# Patient Record
Sex: MALE | Race: WHITE | NOT HISPANIC OR LATINO | Employment: OTHER | URBAN - METROPOLITAN AREA
[De-identification: names, ages, dates, MRNs, and addresses within clinical notes are randomized per-mention and may not be internally consistent; named-entity substitution may affect disease eponyms.]

---

## 2024-05-24 ENCOUNTER — HOSPITAL ENCOUNTER (INPATIENT)
Facility: CLINIC | Age: 74
LOS: 1 days | Discharge: ANOTHER HOSPITAL | DRG: 282 | End: 2024-05-25
Attending: EMERGENCY MEDICINE | Admitting: STUDENT IN AN ORGANIZED HEALTH CARE EDUCATION/TRAINING PROGRAM
Payer: MEDICARE

## 2024-05-24 ENCOUNTER — APPOINTMENT (OUTPATIENT)
Dept: GENERAL RADIOLOGY | Facility: CLINIC | Age: 74
DRG: 282 | End: 2024-05-24
Attending: EMERGENCY MEDICINE
Payer: MEDICARE

## 2024-05-24 DIAGNOSIS — I21.4 NSTEMI (NON-ST ELEVATED MYOCARDIAL INFARCTION) (H): Primary | ICD-10-CM

## 2024-05-24 LAB
ANION GAP SERPL CALCULATED.3IONS-SCNC: 13 MMOL/L (ref 7–15)
BASOPHILS # BLD AUTO: 0.1 10E3/UL (ref 0–0.2)
BASOPHILS NFR BLD AUTO: 1 %
BUN SERPL-MCNC: 17.1 MG/DL (ref 8–23)
CALCIUM SERPL-MCNC: 9 MG/DL (ref 8.8–10.2)
CHLORIDE SERPL-SCNC: 98 MMOL/L (ref 98–107)
CHOLEST SERPL-MCNC: 167 MG/DL
CREAT SERPL-MCNC: 0.95 MG/DL (ref 0.67–1.17)
DEPRECATED HCO3 PLAS-SCNC: 23 MMOL/L (ref 22–29)
EGFRCR SERPLBLD CKD-EPI 2021: 85 ML/MIN/1.73M2
EOSINOPHIL # BLD AUTO: 0.1 10E3/UL (ref 0–0.7)
EOSINOPHIL NFR BLD AUTO: 1 %
ERYTHROCYTE [DISTWIDTH] IN BLOOD BY AUTOMATED COUNT: 12 % (ref 10–15)
GLUCOSE BLDC GLUCOMTR-MCNC: 304 MG/DL (ref 70–99)
GLUCOSE BLDC GLUCOMTR-MCNC: 332 MG/DL (ref 70–99)
GLUCOSE SERPL-MCNC: 390 MG/DL (ref 70–99)
HBA1C MFR BLD: 9 %
HCT VFR BLD AUTO: 41.3 % (ref 40–53)
HDLC SERPL-MCNC: 45 MG/DL
HGB BLD-MCNC: 14.5 G/DL (ref 13.3–17.7)
HOLD SPECIMEN: NORMAL
HOLD SPECIMEN: NORMAL
IMM GRANULOCYTES # BLD: 0 10E3/UL
IMM GRANULOCYTES NFR BLD: 0 %
LDLC SERPL CALC-MCNC: 98 MG/DL
LYMPHOCYTES # BLD AUTO: 1.3 10E3/UL (ref 0.8–5.3)
LYMPHOCYTES NFR BLD AUTO: 18 %
MAGNESIUM SERPL-MCNC: 2 MG/DL (ref 1.7–2.3)
MCH RBC QN AUTO: 30.9 PG (ref 26.5–33)
MCHC RBC AUTO-ENTMCNC: 35.1 G/DL (ref 31.5–36.5)
MCV RBC AUTO: 88 FL (ref 78–100)
MONOCYTES # BLD AUTO: 0.7 10E3/UL (ref 0–1.3)
MONOCYTES NFR BLD AUTO: 9 %
NEUTROPHILS # BLD AUTO: 5.4 10E3/UL (ref 1.6–8.3)
NEUTROPHILS NFR BLD AUTO: 71 %
NONHDLC SERPL-MCNC: 122 MG/DL
NRBC # BLD AUTO: 0 10E3/UL
NRBC BLD AUTO-RTO: 0 /100
PHOSPHATE SERPL-MCNC: 2.3 MG/DL (ref 2.5–4.5)
PLATELET # BLD AUTO: 315 10E3/UL (ref 150–450)
POTASSIUM SERPL-SCNC: 4.4 MMOL/L (ref 3.4–5.3)
RBC # BLD AUTO: 4.69 10E6/UL (ref 4.4–5.9)
SODIUM SERPL-SCNC: 134 MMOL/L (ref 135–145)
TRIGL SERPL-MCNC: 119 MG/DL
TROPONIN T SERPL HS-MCNC: 265 NG/L
TROPONIN T SERPL HS-MCNC: 592 NG/L
TROPONIN T SERPL HS-MCNC: 771 NG/L
WBC # BLD AUTO: 7.6 10E3/UL (ref 4–11)

## 2024-05-24 PROCEDURE — 250N000013 HC RX MED GY IP 250 OP 250 PS 637: Performed by: STUDENT IN AN ORGANIZED HEALTH CARE EDUCATION/TRAINING PROGRAM

## 2024-05-24 PROCEDURE — 85025 COMPLETE CBC W/AUTO DIFF WBC: CPT | Performed by: EMERGENCY MEDICINE

## 2024-05-24 PROCEDURE — 120N000001 HC R&B MED SURG/OB

## 2024-05-24 PROCEDURE — 84484 ASSAY OF TROPONIN QUANT: CPT | Performed by: EMERGENCY MEDICINE

## 2024-05-24 PROCEDURE — 84100 ASSAY OF PHOSPHORUS: CPT | Performed by: STUDENT IN AN ORGANIZED HEALTH CARE EDUCATION/TRAINING PROGRAM

## 2024-05-24 PROCEDURE — 36415 COLL VENOUS BLD VENIPUNCTURE: CPT | Performed by: EMERGENCY MEDICINE

## 2024-05-24 PROCEDURE — 99285 EMERGENCY DEPT VISIT HI MDM: CPT | Mod: 25

## 2024-05-24 PROCEDURE — 99223 1ST HOSP IP/OBS HIGH 75: CPT | Mod: AI | Performed by: STUDENT IN AN ORGANIZED HEALTH CARE EDUCATION/TRAINING PROGRAM

## 2024-05-24 PROCEDURE — 80061 LIPID PANEL: CPT | Performed by: STUDENT IN AN ORGANIZED HEALTH CARE EDUCATION/TRAINING PROGRAM

## 2024-05-24 PROCEDURE — 71046 X-RAY EXAM CHEST 2 VIEWS: CPT

## 2024-05-24 PROCEDURE — 250N000012 HC RX MED GY IP 250 OP 636 PS 637: Performed by: STUDENT IN AN ORGANIZED HEALTH CARE EDUCATION/TRAINING PROGRAM

## 2024-05-24 PROCEDURE — 250N000011 HC RX IP 250 OP 636: Performed by: EMERGENCY MEDICINE

## 2024-05-24 PROCEDURE — 250N000013 HC RX MED GY IP 250 OP 250 PS 637: Performed by: EMERGENCY MEDICINE

## 2024-05-24 PROCEDURE — 83036 HEMOGLOBIN GLYCOSYLATED A1C: CPT | Performed by: STUDENT IN AN ORGANIZED HEALTH CARE EDUCATION/TRAINING PROGRAM

## 2024-05-24 PROCEDURE — 96374 THER/PROPH/DIAG INJ IV PUSH: CPT | Mod: 59

## 2024-05-24 PROCEDURE — 36415 COLL VENOUS BLD VENIPUNCTURE: CPT | Performed by: STUDENT IN AN ORGANIZED HEALTH CARE EDUCATION/TRAINING PROGRAM

## 2024-05-24 PROCEDURE — 93005 ELECTROCARDIOGRAM TRACING: CPT

## 2024-05-24 PROCEDURE — 82962 GLUCOSE BLOOD TEST: CPT

## 2024-05-24 PROCEDURE — 80048 BASIC METABOLIC PNL TOTAL CA: CPT | Performed by: EMERGENCY MEDICINE

## 2024-05-24 PROCEDURE — 84484 ASSAY OF TROPONIN QUANT: CPT | Performed by: STUDENT IN AN ORGANIZED HEALTH CARE EDUCATION/TRAINING PROGRAM

## 2024-05-24 PROCEDURE — 83735 ASSAY OF MAGNESIUM: CPT | Performed by: STUDENT IN AN ORGANIZED HEALTH CARE EDUCATION/TRAINING PROGRAM

## 2024-05-24 PROCEDURE — 85520 HEPARIN ASSAY: CPT | Performed by: STUDENT IN AN ORGANIZED HEALTH CARE EDUCATION/TRAINING PROGRAM

## 2024-05-24 RX ORDER — NICOTINE POLACRILEX 4 MG
15-30 LOZENGE BUCCAL
Status: DISCONTINUED | OUTPATIENT
Start: 2024-05-24 | End: 2024-05-25 | Stop reason: HOSPADM

## 2024-05-24 RX ORDER — PROCHLORPERAZINE 25 MG
12.5 SUPPOSITORY, RECTAL RECTAL EVERY 12 HOURS PRN
Status: DISCONTINUED | OUTPATIENT
Start: 2024-05-24 | End: 2024-05-25 | Stop reason: HOSPADM

## 2024-05-24 RX ORDER — AMOXICILLIN 250 MG
1 CAPSULE ORAL 2 TIMES DAILY PRN
Status: DISCONTINUED | OUTPATIENT
Start: 2024-05-24 | End: 2024-05-25 | Stop reason: HOSPADM

## 2024-05-24 RX ORDER — AMOXICILLIN 250 MG
2 CAPSULE ORAL 2 TIMES DAILY PRN
Status: DISCONTINUED | OUTPATIENT
Start: 2024-05-24 | End: 2024-05-25 | Stop reason: HOSPADM

## 2024-05-24 RX ORDER — HEPARIN SODIUM 10000 [USP'U]/100ML
0-5000 INJECTION, SOLUTION INTRAVENOUS CONTINUOUS
Status: DISCONTINUED | OUTPATIENT
Start: 2024-05-24 | End: 2024-05-25 | Stop reason: HOSPADM

## 2024-05-24 RX ORDER — ASPIRIN 81 MG/1
81 TABLET, CHEWABLE ORAL DAILY
Status: DISCONTINUED | OUTPATIENT
Start: 2024-05-25 | End: 2024-05-25 | Stop reason: HOSPADM

## 2024-05-24 RX ORDER — CALCIUM CARBONATE 500 MG/1
1000 TABLET, CHEWABLE ORAL 4 TIMES DAILY PRN
Status: DISCONTINUED | OUTPATIENT
Start: 2024-05-24 | End: 2024-05-25 | Stop reason: HOSPADM

## 2024-05-24 RX ORDER — ROSUVASTATIN CALCIUM 20 MG/1
20 TABLET, COATED ORAL AT BEDTIME
Status: DISCONTINUED | OUTPATIENT
Start: 2024-05-24 | End: 2024-05-25 | Stop reason: HOSPADM

## 2024-05-24 RX ORDER — METOPROLOL TARTRATE 25 MG/1
25 TABLET, FILM COATED ORAL 2 TIMES DAILY
Status: DISCONTINUED | OUTPATIENT
Start: 2024-05-24 | End: 2024-05-25 | Stop reason: HOSPADM

## 2024-05-24 RX ORDER — METFORMIN HCL 500 MG
1000 TABLET, EXTENDED RELEASE 24 HR ORAL 2 TIMES DAILY WITH MEALS
COMMUNITY
Start: 2023-11-13

## 2024-05-24 RX ORDER — LIDOCAINE 40 MG/G
CREAM TOPICAL
Status: DISCONTINUED | OUTPATIENT
Start: 2024-05-24 | End: 2024-05-25 | Stop reason: HOSPADM

## 2024-05-24 RX ORDER — NALOXONE HYDROCHLORIDE 0.4 MG/ML
0.4 INJECTION, SOLUTION INTRAMUSCULAR; INTRAVENOUS; SUBCUTANEOUS
Status: DISCONTINUED | OUTPATIENT
Start: 2024-05-24 | End: 2024-05-25 | Stop reason: HOSPADM

## 2024-05-24 RX ORDER — AMLODIPINE BESYLATE 5 MG/1
1 TABLET ORAL DAILY
Status: ON HOLD | COMMUNITY
Start: 2023-10-03 | End: 2024-05-26

## 2024-05-24 RX ORDER — ASPIRIN 325 MG
325 TABLET ORAL ONCE
Status: COMPLETED | OUTPATIENT
Start: 2024-05-24 | End: 2024-05-24

## 2024-05-24 RX ORDER — MORPHINE SULFATE 2 MG/ML
1 INJECTION, SOLUTION INTRAMUSCULAR; INTRAVENOUS
Status: DISCONTINUED | OUTPATIENT
Start: 2024-05-24 | End: 2024-05-25 | Stop reason: HOSPADM

## 2024-05-24 RX ORDER — ACETAMINOPHEN 325 MG/1
650 TABLET ORAL EVERY 4 HOURS PRN
Status: DISCONTINUED | OUTPATIENT
Start: 2024-05-24 | End: 2024-05-25 | Stop reason: HOSPADM

## 2024-05-24 RX ORDER — DEXTROSE MONOHYDRATE 25 G/50ML
25-50 INJECTION, SOLUTION INTRAVENOUS
Status: DISCONTINUED | OUTPATIENT
Start: 2024-05-24 | End: 2024-05-25 | Stop reason: HOSPADM

## 2024-05-24 RX ORDER — GLIPIZIDE 5 MG/1
10 TABLET, FILM COATED, EXTENDED RELEASE ORAL
COMMUNITY

## 2024-05-24 RX ORDER — NALOXONE HYDROCHLORIDE 0.4 MG/ML
0.2 INJECTION, SOLUTION INTRAMUSCULAR; INTRAVENOUS; SUBCUTANEOUS
Status: DISCONTINUED | OUTPATIENT
Start: 2024-05-24 | End: 2024-05-25 | Stop reason: HOSPADM

## 2024-05-24 RX ORDER — LISINOPRIL 10 MG/1
10 TABLET ORAL DAILY
Status: DISCONTINUED | OUTPATIENT
Start: 2024-05-24 | End: 2024-05-25 | Stop reason: HOSPADM

## 2024-05-24 RX ORDER — ACETAMINOPHEN 650 MG/1
650 SUPPOSITORY RECTAL EVERY 4 HOURS PRN
Status: DISCONTINUED | OUTPATIENT
Start: 2024-05-24 | End: 2024-05-25 | Stop reason: HOSPADM

## 2024-05-24 RX ORDER — ONDANSETRON 2 MG/ML
4 INJECTION INTRAMUSCULAR; INTRAVENOUS EVERY 6 HOURS PRN
Status: DISCONTINUED | OUTPATIENT
Start: 2024-05-24 | End: 2024-05-25 | Stop reason: HOSPADM

## 2024-05-24 RX ORDER — ONDANSETRON 4 MG/1
4 TABLET, ORALLY DISINTEGRATING ORAL EVERY 6 HOURS PRN
Status: DISCONTINUED | OUTPATIENT
Start: 2024-05-24 | End: 2024-05-25 | Stop reason: HOSPADM

## 2024-05-24 RX ORDER — PROCHLORPERAZINE MALEATE 5 MG
5 TABLET ORAL EVERY 6 HOURS PRN
Status: DISCONTINUED | OUTPATIENT
Start: 2024-05-24 | End: 2024-05-25 | Stop reason: HOSPADM

## 2024-05-24 RX ORDER — DAPAGLIFLOZIN 5 MG/1
5 TABLET, FILM COATED ORAL DAILY
COMMUNITY
End: 2024-05-24

## 2024-05-24 RX ADMIN — POTASSIUM & SODIUM PHOSPHATES POWDER PACK 280-160-250 MG 1 PACKET: 280-160-250 PACK at 22:43

## 2024-05-24 RX ADMIN — HEPARIN SODIUM 1150 UNITS/HR: 10000 INJECTION, SOLUTION INTRAVENOUS at 17:59

## 2024-05-24 RX ADMIN — ASPIRIN 325 MG ORAL TABLET 325 MG: 325 PILL ORAL at 17:38

## 2024-05-24 RX ADMIN — INSULIN GLARGINE 15 UNITS: 100 INJECTION, SOLUTION SUBCUTANEOUS at 21:16

## 2024-05-24 RX ADMIN — METOPROLOL TARTRATE 25 MG: 25 TABLET, FILM COATED ORAL at 21:11

## 2024-05-24 RX ADMIN — ROSUVASTATIN CALCIUM 20 MG: 20 TABLET, FILM COATED ORAL at 21:11

## 2024-05-24 RX ADMIN — LISINOPRIL 10 MG: 10 TABLET ORAL at 21:12

## 2024-05-24 ASSESSMENT — ACTIVITIES OF DAILY LIVING (ADL)
ADLS_ACUITY_SCORE: 36
ADLS_ACUITY_SCORE: 35
ADLS_ACUITY_SCORE: 36
ADLS_ACUITY_SCORE: 35

## 2024-05-24 ASSESSMENT — COLUMBIA-SUICIDE SEVERITY RATING SCALE - C-SSRS
2. HAVE YOU ACTUALLY HAD ANY THOUGHTS OF KILLING YOURSELF IN THE PAST MONTH?: NO
6. HAVE YOU EVER DONE ANYTHING, STARTED TO DO ANYTHING, OR PREPARED TO DO ANYTHING TO END YOUR LIFE?: NO
1. IN THE PAST MONTH, HAVE YOU WISHED YOU WERE DEAD OR WISHED YOU COULD GO TO SLEEP AND NOT WAKE UP?: NO

## 2024-05-24 NOTE — ED TRIAGE NOTES
Chest heaviness for 4 hours. Radiates to left shoulder. No SOB. Had cold sweat and nauseated when it started. Was resting when he noticed pain.

## 2024-05-24 NOTE — PHARMACY-ADMISSION MEDICATION HISTORY
Pharmacist Admission Medication History    Admission medication history is complete. The information provided in this note is only as accurate as the sources available at the time of the update.    Information Source(s): Patient and CareEverywhere/SureScripts via in-person    Pertinent Information: None    Changes made to PTA medication list:  Added: ALL  Deleted: None  Changed: None    Allergies reviewed with patient and updates made in EHR: yes    Medication History Completed By: Francisco Grimaldo RP 5/24/2024 6:26 PM    PTA Med List   Medication Sig Last Dose    amLODIPine (NORVASC) 5 MG tablet Take 1 tablet by mouth daily 5/24/2024 at AM    glipiZIDE (GLUCOTROL XL) 5 MG 24 hr tablet Take 10 mg by mouth daily (with breakfast) 5/24/2024 at AM    metFORMIN (GLUCOPHAGE XR) 500 MG 24 hr tablet Take 1,000 mg by mouth 2 times daily (with meals) 5/24/2024 at x1

## 2024-05-24 NOTE — H&P
[facility]    History and Physical - Hospitalist Service       Date of Admission:  5/24/2024    Assessment & Plan      Kaden Cain is a 74 year old male admitted on 5/24/2024.     He has history of diabetes mellitus type 2 and essential hypertension.    He he presented to ER with sudden onset chest pressure with radiation to the left shoulder starting around 11:30 AM.  It was associated with some nausea and diaphoresis and patient struggled with the symptoms for about 2 hours and the subsided and he was able to take a nap.  When his wife came back from work she advised her to come to the ER.  His symptoms had pretty much resolved by the time he came to the ER.    Vitals on presentation: Temperature 97.40 Fahrenheit, heart rate 95/min, blood pressure 167/118, respirate rate of 18 and oxygen saturation of 99% on room air.    His EKG showed nonspecific ST changes.  First troponin was elevated at 265.  Rest of the CBC and BMP were unremarkable except glucose of 390.    NSTEMI.  Started on heparin drip and aspirin.  Cardiology consult.  Check echocardiogram.  Check lipid panel, add Crestor 20 mg nightly.    Diabetes mellitus type 2 uncontrolled.  Blood sugar on presentation was 390.  Blood sugar appears to be uncontrolled at baseline to with patient and his blood sugars are often around 200.  Hold prior to admission dose of glipizide and metformin.  Will do Lantus 15 units at night, NovoLog 5 units Premeal and sliding scale while inpatient.    Essential hypertension.  Prior to admission on amlodipine which can be continued.  Will add metoprolol 25 mg daily.          Diet:  Cardiac diet.  DVT Prophylaxis: On heparin drip  Stephens Catheter: Not present  Lines: None     Cardiac Monitoring: None  Code Status:  Full code    Clinically Significant Risk Factors Present on Admission                       # Overweight: Estimated body mass index is 27.28 kg/m  as calculated from the following:    Height as  "of this encounter: 1.854 m (6' 1\").    Weight as of this encounter: 93.8 kg (206 lb 12.7 oz).              Disposition Plan     Medically Ready for Discharge: Anticipated in 2-4 Days           Kelvin Antony MD  Hospitalist Service  [facility]  Securely message with [email or web address] (more info)  Text page via [email or web address] Paging/Directory     ______________________________________________________________________    Chief Complaint   Chest pressure    History is obtained from the patient.  Wife at bedside.    History of Present Illness   Kaden Cain is a 74 year old male admitted on 5/24/2024.     He has history of diabetes mellitus type 2 and essential hypertension.    He he presented to ER with sudden onset chest pressure with radiation to the left shoulder starting around 11:30 AM.  It was associated with some nausea and diaphoresis and patient struggled with the symptoms for about 2 hours and the subsided and he was able to take a nap.  When his wife came back from work she advised her to come to the ER.  His symptoms had pretty much resolved by the time he came to the ER.    Vitals on presentation: Temperature 97.40 Fahrenheit, heart rate 95/min, blood pressure 167/118, respirate rate of 18 and oxygen saturation of 99% on room air.    His EKG showed nonspecific ST changes.  First troponin was elevated at 265.  Rest of the CBC and BMP were unremarkable except glucose of 390.    Past Medical History    No past medical history on file.    Past Surgical History   No past surgical history on file.    Prior to Admission Medications   Prior to Admission Medications   Prescriptions Last Dose Informant Patient Reported? Taking?   amLODIPine (NORVASC) 5 MG tablet   Yes Yes   Sig: Take 1 tablet by mouth daily   dapagliflozin (FARXIGA) 5 MG TABS tablet   Yes Yes   Sig: Take 5 mg by mouth daily   glipiZIDE (GLUCOTROL XL) 5 MG 24 hr tablet   Yes Yes   Sig: Take 10 mg by mouth daily (with breakfast)   metFORMIN " (GLUCOPHAGE XR) 500 MG 24 hr tablet   Yes Yes   Sig: Take 1 tablet with dinner. After 1 week, increase to 2 tablets with dinner. After another week, increase to 1 tablet with breakfast and 2 tablets with dinner. After another week, increase to 2 tablets with breakfast and 2 tablets with dinner.      Facility-Administered Medications: None        Review of Systems    The 10 point Review of Systems is negative other than noted in the HPI or here.      Physical Exam   Vital Signs: Temp: 97.4  F (36.3  C) Temp src: Temporal BP: (!) 158/98 Pulse: 77   Resp: 22 SpO2: 97 % O2 Device: None (Room air)    Weight: 206 lbs 12.66 oz    General Appearance: Alert, awake and oriented x 3.  Respiratory: Clear to auscultation.  Cardiovascular: S1-S2 normal  GI: Soft and nontender  Skin: No rash  Other: No edema    Medical Decision Making       MANAGEMENT DISCUSSED with the following over the past 24 hours: ER physician, patient and his wife at bedside       Data     I have personally reviewed the following data over the past 24 hrs:    7.6  \   14.5   / 315     134 (L) 98 17.1 /  332 (H)   4.4 23 0.95 \     Trop: 265 (HH) BNP: N/A       Imaging results reviewed over the past 24 hrs:   Recent Results (from the past 24 hour(s))   Chest XR,  PA & LAT    Narrative    EXAM: XR CHEST 2 VIEWS  LOCATION: [facility]  DATE: 5/24/2024    INDICATION: chest pain  COMPARISON: None.      Impression    IMPRESSION:     Heart size is normal. Lungs are clear bilaterally. Mediastinum and visualized bony structures are unremarkable.

## 2024-05-24 NOTE — ED PROVIDER NOTES
"  Emergency Department Note      History of Present Illness     Chief Complaint  Chest Pain    HPI  Kaden Cain is a 74 year old male with history of hypertension and type 2 diabetes who presents at the emergency department with his wife for evaluation of chest pain. Kaden reports that around 1130 this morning he experienced a sudden onset of chest pressure while seated at his house. He described it felt as if someone was \"standing on his chest.\" The chest pain radiated to his left shoulder, in which he described felt like a moderate \"tooth ache\" in his shoulder. His chest pressure became severe for about an hour, during which he also had nausea and cold sweats. He denies shortness of breath, but acknowledges that breathing made his pains worse. His chest pressure has somewhat resolved with time but is still present at the emergency department. Kaden states that this has not happened before and he felt fine prior. Patient notes taking metformin and glipizide for his type 2 diabetes and amlodipine for hypertension. He denies taking Aspirin or tylenol for pain. The patient notes no activity changes, but mowed the lawn yesterday. Denies abdominal pain.    Independent Historian  None    Review of External Notes  5/13/24: Clinic note reviewed    Past Medical History   Medical History and Problem List  Benign neoplasm of sigmoid colon  Benign neoplasm of transverse colon  Disorder of intestine  Polyp of colon  Type 2 diabetes  Hypertension     Medications  Norvasc  Farxiga  Glipizide  Metformin    Surgical History   The patient has no pertinent past surgical history.   Physical Exam   Patient Vitals for the past 24 hrs:   BP Temp Temp src Pulse Resp SpO2 Height Weight   05/24/24 1730 (!) 158/98 -- -- 77 22 97 % -- --   05/24/24 1639 (!) 167/118 97.4  F (36.3  C) Temporal 95 18 99 % 1.854 m (6' 1\") 93.8 kg (206 lb 12.7 oz)     Physical Exam  General: No acute distress  Head: No obvious trauma to head.  Ears, Nose, " Throat:  External ears normal.  Nose normal.  No pharyngeal erythema, swelling or exudate.  Midline uvula. Moist mucus membranes.  Eyes:  Conjunctivae clear.   Neck: Normal range of motion.  Neck supple.   CV: Regular rate and rhythm.  No murmurs.      Respiratory: Effort normal and breath sounds normal.  No wheezing or crackles.   Gastrointestinal: Soft.  No distension. There is no tenderness.  There is no rigidity, no rebound and no guarding.   Musculoskeletal: Normal range of motion.  Non tender extremities to palpations. No lower extremity edema  Neuro: Alert. Moving all extremities appropriately.  Normal speech.    Skin: Skin is warm and dry.  No rash noted.   Psych: Normal mood and affect. Behavior is normal.     Diagnostics   Lab Results   Labs Ordered and Resulted from Time of ED Arrival to Time of ED Departure   BASIC METABOLIC PANEL - Abnormal       Result Value    Sodium 134 (*)     Potassium 4.4      Chloride 98      Carbon Dioxide (CO2) 23      Anion Gap 13      Urea Nitrogen 17.1      Creatinine 0.95      GFR Estimate 85      Calcium 9.0      Glucose 390 (*)    TROPONIN T, HIGH SENSITIVITY - Abnormal    Troponin T, High Sensitivity 265 (*)    CBC WITH PLATELETS AND DIFFERENTIAL    WBC Count 7.6      RBC Count 4.69      Hemoglobin 14.5      Hematocrit 41.3      MCV 88      MCH 30.9      MCHC 35.1      RDW 12.0      Platelet Count 315      % Neutrophils 71      % Lymphocytes 18      % Monocytes 9      % Eosinophils 1      % Basophils 1      % Immature Granulocytes 0      NRBCs per 100 WBC 0      Absolute Neutrophils 5.4      Absolute Lymphocytes 1.3      Absolute Monocytes 0.7      Absolute Eosinophils 0.1      Absolute Basophils 0.1      Absolute Immature Granulocytes 0.0      Absolute NRBCs 0.0     GLUCOSE MONITOR NURSING POCT     Imaging  Chest XR,  PA & LAT   Final Result   IMPRESSION:       Heart size is normal. Lungs are clear bilaterally. Mediastinum and visualized bony structures are  unremarkable.        EKG   ECG taken at 1646, ECG read at 1646  Sinus rhythm with 1st degree AV block with premature supraventricular complexes  Left axis deviation  Left ventricular hypertrophy with repolarization abnormality   Rate 88 bpm. PA interval 218 ms. QRS duration 90 ms. QT/QTc 372/450 ms. P-R-T axes 62 -39 110.    Independent Interpretation  CXR: No pneumothorax, infiltrate, or pleural effusion.  ED Course    Medications Administered  Medications   heparin 25,000 units in 0.45% NaCl 250 mL ANTICOAGULANT infusion (1,150 Units/hr Intravenous $New Bag 5/24/24 1756)   aspirin (ASA) tablet 325 mg (325 mg Oral $Given 5/24/24 1738)   heparin loading dose for LOW INTENSITY TREATMENT * Give BEFORE starting heparin infusion (5,650 Units Intravenous $Given 5/24/24 1801)     Discussion of Management  Admitting Hospitalist, Dr. Antony.    Social Determinants of Health adding to complexity of care  None    ED Course  ED Course as of 05/24/24 1806   Fri May 24, 2024   1644 I obtained history and examined the patient as noted above     1743 I rechecked and updated the patient. I told him about plan for admission.   1803 I spoke with hospitalist, Dr. Antony, for admission.     Medical Decision Making / Diagnosis   CMS Diagnoses: None    MIPS     None    [facility]  Kaden Cain is a 74 year old male presenting with chest pain.  He is hemodynamically stable and appears well.  His story about his chest pain is concerning, given that it radiated to the left shoulder, and he was nauseated.  He is given a full dose aspirin.  EKG shows no ischemic changes.  CBC, BMP are grossly unremarkable.  Troponin is elevated 265.  He continues to endorse chest pain, but states it is improving.  He is started on a heparin infusion.  Shortly afterward, he denies any chest pain.  I discussed the patient with the hospitalist, agreed to admit the patient to their service.  He remains in stable condition and is transferred to the  floor.    Disposition  The patient was admitted to the hospital.     ICD-10 Codes:    ICD-10-CM    1. NSTEMI (non-ST elevated myocardial infarction) (H)  I21.4          Discharge Medications  New Prescriptions    No medications on file     Scribe Disclosure:  Lauri NUNEZ, am serving as a scribe at 5:26 PM on 5/24/2024 to document services personally performed by Lance Lopez MD based on my observations and the provider's statements to me.     Scribe Disclosure:  IIRVIN, am serving as a scribe  at 5:18 PM on 5/24/2024 to document services personally performed by Lance Lopez MD based on my observations and the provider's statements to me.      Lance Lopez MD  05/24/24 [id number]

## 2024-05-24 NOTE — ED NOTES
"[facility]  ED Nurse Handoff Report    Kaden Cain is a 74 year old male   ED Chief complaint: Chest Pain  . ED Diagnosis:   Final diagnoses:   NSTEMI (non-ST elevated myocardial infarction) (H)     Allergies: No Known Allergies    Code Status: Full Code  Activity level - Baseline/Home:  Independent.   Activity Level - Current:   Assist X 1.   Lift room needed: No.   Bariatric: No   Needed: No   Isolation: No.   Infection: Not Applicable.     Vital Signs:   Vitals:    05/24/24 1639 05/24/24 1730   BP: (!) 167/118 (!) 158/98   Pulse: 95 77   Resp: 18 22   Temp: 97.4  F (36.3  C)    TempSrc: Temporal    SpO2: 99% 97%   Weight: 93.8 kg (206 lb 12.7 oz)    Height: 1.854 m (6' 1\")        Cardiac Rhythm:  ,      Pain level:    Patient confused: No.   Patient Falls Risk: No.   Elimination Status: Has voided   Patient Report - Initial Complaint: Chest pain.   Focused Assessment: He he presented to ER with sudden onset chest pressure with radiation to the left shoulder starting around 11:30 AM.  It was associated with some nausea and diaphoresis and patient struggled with the symptoms for about 2 hours and the subsided and he was able to take a nap.  When his wife came back from work she advised her to come to the ER.  His symptoms had pretty much resolved by the time he came to the ER.    Tests Performed:   Labs Ordered and Resulted from Time of ED Arrival to Time of ED Departure   BASIC METABOLIC PANEL - Abnormal       Result Value    Sodium 134 (*)     Potassium 4.4      Chloride 98      Carbon Dioxide (CO2) 23      Anion Gap 13      Urea Nitrogen 17.1      Creatinine 0.95      GFR Estimate 85      Calcium 9.0      Glucose 390 (*)    TROPONIN T, HIGH SENSITIVITY - Abnormal    Troponin T, High Sensitivity 265 (*)    GLUCOSE BY METER - Abnormal    GLUCOSE BY METER POCT 332 (*)    CBC WITH PLATELETS AND DIFFERENTIAL    WBC Count 7.6      RBC Count 4.69      Hemoglobin 14.5      Hematocrit 41.3      " MCV 88      MCH 30.9      MCHC 35.1      RDW 12.0      Platelet Count 315      % Neutrophils 71      % Lymphocytes 18      % Monocytes 9      % Eosinophils 1      % Basophils 1      % Immature Granulocytes 0      NRBCs per 100 WBC 0      Absolute Neutrophils 5.4      Absolute Lymphocytes 1.3      Absolute Monocytes 0.7      Absolute Eosinophils 0.1      Absolute Basophils 0.1      Absolute Immature Granulocytes 0.0      Absolute NRBCs 0.0     GLUCOSE MONITOR NURSING POCT     Chest XR,  PA & LAT   Final Result   IMPRESSION:       Heart size is normal. Lungs are clear bilaterally. Mediastinum and visualized bony structures are unremarkable.        Treatments provided: See Epic  Family Comments: Spouse at bedside  OBS brochure/video discussed/provided to patient:  No  ED Medications:   Medications   heparin 25,000 units in 0.45% NaCl 250 mL ANTICOAGULANT infusion (1,150 Units/hr Intravenous $New Bag 5/24/24 1756)   insulin aspart (NovoLOG) injection (RAPID ACTING) (has no administration in time range)   aspirin (ASA) tablet 325 mg (325 mg Oral $Given 5/24/24 173)   heparin loading dose for LOW INTENSITY TREATMENT * Give BEFORE starting heparin infusion (5,650 Units Intravenous $Given 5/24/24 1801)     Drips infusing:  Yes  For the majority of the shift, the patient's behavior Green.   Interventions performed were NA.    Sepsis treatment initiated: No     Patient tested for COVID 19 prior to admission: NO    ED Nurse Name/Phone Number: Margaret Camargo RN,   6:51 PM  RECEIVING UNIT ED HANDOFF REVIEW    Above ED Nurse Handoff Report was reviewed: Yes  Reviewed by: Mala Ribeiro RN on May 24, 2024 at 7:03 PM   ENRIQUE Adams called the ED to inform them the note was read: No

## 2024-05-25 ENCOUNTER — APPOINTMENT (OUTPATIENT)
Dept: CARDIOLOGY | Facility: CLINIC | Age: 74
DRG: 282 | End: 2024-05-25
Attending: STUDENT IN AN ORGANIZED HEALTH CARE EDUCATION/TRAINING PROGRAM
Payer: MEDICARE

## 2024-05-25 ENCOUNTER — HOSPITAL ENCOUNTER (INPATIENT)
Facility: CLINIC | Age: 74
LOS: 1 days | Discharge: HOME | DRG: 321 | End: 2024-05-26
Attending: STUDENT IN AN ORGANIZED HEALTH CARE EDUCATION/TRAINING PROGRAM | Admitting: STUDENT IN AN ORGANIZED HEALTH CARE EDUCATION/TRAINING PROGRAM
Payer: MEDICARE

## 2024-05-25 VITALS
WEIGHT: 206.7 LBS | RESPIRATION RATE: 16 BRPM | DIASTOLIC BLOOD PRESSURE: 73 MMHG | OXYGEN SATURATION: 97 % | HEART RATE: 71 BPM | BODY MASS INDEX: 27.4 KG/M2 | TEMPERATURE: 98 F | SYSTOLIC BLOOD PRESSURE: 138 MMHG | HEIGHT: 73 IN

## 2024-05-25 DIAGNOSIS — I21.4 NSTEMI (NON-ST ELEVATED MYOCARDIAL INFARCTION) (H): Primary | ICD-10-CM

## 2024-05-25 LAB
ACT BLD: 271 SECONDS (ref 74–150)
ACT BLD: 321 SECONDS (ref 74–150)
ANION GAP SERPL CALCULATED.3IONS-SCNC: 12 MMOL/L (ref 7–15)
BUN SERPL-MCNC: 15.4 MG/DL (ref 8–23)
CALCIUM SERPL-MCNC: 8.9 MG/DL (ref 8.8–10.2)
CHLORIDE SERPL-SCNC: 103 MMOL/L (ref 98–107)
CHOLEST SERPL-MCNC: 146 MG/DL
CREAT SERPL-MCNC: 0.99 MG/DL (ref 0.67–1.17)
DEPRECATED HCO3 PLAS-SCNC: 25 MMOL/L (ref 22–29)
EGFRCR SERPLBLD CKD-EPI 2021: 80 ML/MIN/1.73M2
ERYTHROCYTE [DISTWIDTH] IN BLOOD BY AUTOMATED COUNT: 11.9 % (ref 10–15)
ERYTHROCYTE [DISTWIDTH] IN BLOOD BY AUTOMATED COUNT: 12 % (ref 10–15)
GLUCOSE BLDC GLUCOMTR-MCNC: 110 MG/DL (ref 70–99)
GLUCOSE BLDC GLUCOMTR-MCNC: 131 MG/DL (ref 70–99)
GLUCOSE BLDC GLUCOMTR-MCNC: 140 MG/DL (ref 70–99)
GLUCOSE BLDC GLUCOMTR-MCNC: 157 MG/DL (ref 70–99)
GLUCOSE BLDC GLUCOMTR-MCNC: 233 MG/DL (ref 70–99)
GLUCOSE SERPL-MCNC: 119 MG/DL (ref 70–99)
HCT VFR BLD AUTO: 40.5 % (ref 40–53)
HCT VFR BLD AUTO: 40.9 % (ref 40–53)
HDLC SERPL-MCNC: 42 MG/DL
HGB BLD-MCNC: 13.9 G/DL (ref 13.3–17.7)
HGB BLD-MCNC: 14 G/DL (ref 13.3–17.7)
LDLC SERPL CALC-MCNC: 91 MG/DL
LVEF ECHO: NORMAL
MAGNESIUM SERPL-MCNC: 2.2 MG/DL (ref 1.7–2.3)
MCH RBC QN AUTO: 30.7 PG (ref 26.5–33)
MCH RBC QN AUTO: 30.8 PG (ref 26.5–33)
MCHC RBC AUTO-ENTMCNC: 34.2 G/DL (ref 31.5–36.5)
MCHC RBC AUTO-ENTMCNC: 34.3 G/DL (ref 31.5–36.5)
MCV RBC AUTO: 89 FL (ref 78–100)
MCV RBC AUTO: 90 FL (ref 78–100)
NONHDLC SERPL-MCNC: 104 MG/DL
PHOSPHATE SERPL-MCNC: 3.7 MG/DL (ref 2.5–4.5)
PLATELET # BLD AUTO: 277 10E3/UL (ref 150–450)
PLATELET # BLD AUTO: 287 10E3/UL (ref 150–450)
POTASSIUM SERPL-SCNC: 4.3 MMOL/L (ref 3.4–5.3)
RBC # BLD AUTO: 4.53 10E6/UL (ref 4.4–5.9)
RBC # BLD AUTO: 4.54 10E6/UL (ref 4.4–5.9)
SODIUM SERPL-SCNC: 140 MMOL/L (ref 135–145)
TRIGL SERPL-MCNC: 64 MG/DL
TROPONIN T SERPL HS-MCNC: 1056 NG/L
TROPONIN T SERPL HS-MCNC: 842 NG/L
TROPONIN T SERPL HS-MCNC: 952 NG/L
UFH PPP CHRO-ACNC: 0.26 IU/ML
UFH PPP CHRO-ACNC: 0.3 IU/ML
UFH PPP CHRO-ACNC: 0.33 IU/ML
WBC # BLD AUTO: 7.4 10E3/UL (ref 4–11)
WBC # BLD AUTO: 9.1 10E3/UL (ref 4–11)

## 2024-05-25 PROCEDURE — 93005 ELECTROCARDIOGRAM TRACING: CPT

## 2024-05-25 PROCEDURE — 92979 ENDOLUMINL IVUS OCT C EA: CPT | Mod: 26 | Performed by: INTERNAL MEDICINE

## 2024-05-25 PROCEDURE — C1725 CATH, TRANSLUMIN NON-LASER: HCPCS | Performed by: INTERNAL MEDICINE

## 2024-05-25 PROCEDURE — C9600 PERC DRUG-EL COR STENT SING: HCPCS | Performed by: INTERNAL MEDICINE

## 2024-05-25 PROCEDURE — B2111ZZ FLUOROSCOPY OF MULTIPLE CORONARY ARTERIES USING LOW OSMOLAR CONTRAST: ICD-10-PCS | Performed by: INTERNAL MEDICINE

## 2024-05-25 PROCEDURE — 99153 MOD SED SAME PHYS/QHP EA: CPT | Performed by: INTERNAL MEDICINE

## 2024-05-25 PROCEDURE — 99232 SBSQ HOSP IP/OBS MODERATE 35: CPT | Performed by: INTERNAL MEDICINE

## 2024-05-25 PROCEDURE — 250N000011 HC RX IP 250 OP 636: Performed by: INTERNAL MEDICINE

## 2024-05-25 PROCEDURE — 93306 TTE W/DOPPLER COMPLETE: CPT | Mod: 26 | Performed by: INTERNAL MEDICINE

## 2024-05-25 PROCEDURE — 250N000011 HC RX IP 250 OP 636: Performed by: PHYSICIAN ASSISTANT

## 2024-05-25 PROCEDURE — 99152 MOD SED SAME PHYS/QHP 5/>YRS: CPT | Performed by: INTERNAL MEDICINE

## 2024-05-25 PROCEDURE — C8929 TTE W OR WO FOL WCON,DOPPLER: HCPCS

## 2024-05-25 PROCEDURE — C1874 STENT, COATED/COV W/DEL SYS: HCPCS | Performed by: INTERNAL MEDICINE

## 2024-05-25 PROCEDURE — 36415 COLL VENOUS BLD VENIPUNCTURE: CPT | Performed by: HOSPITALIST

## 2024-05-25 PROCEDURE — 210N000002 HC R&B HEART CARE

## 2024-05-25 PROCEDURE — 250N000012 HC RX MED GY IP 250 OP 636 PS 637: Performed by: PHYSICIAN ASSISTANT

## 2024-05-25 PROCEDURE — 36415 COLL VENOUS BLD VENIPUNCTURE: CPT | Performed by: PHYSICIAN ASSISTANT

## 2024-05-25 PROCEDURE — C1894 INTRO/SHEATH, NON-LASER: HCPCS | Performed by: INTERNAL MEDICINE

## 2024-05-25 PROCEDURE — 85520 HEPARIN ASSAY: CPT | Performed by: STUDENT IN AN ORGANIZED HEALTH CARE EDUCATION/TRAINING PROGRAM

## 2024-05-25 PROCEDURE — 85347 COAGULATION TIME ACTIVATED: CPT

## 2024-05-25 PROCEDURE — 84484 ASSAY OF TROPONIN QUANT: CPT | Performed by: INTERNAL MEDICINE

## 2024-05-25 PROCEDURE — 82465 ASSAY BLD/SERUM CHOLESTEROL: CPT | Performed by: HOSPITALIST

## 2024-05-25 PROCEDURE — 250N000013 HC RX MED GY IP 250 OP 250 PS 637: Performed by: PHYSICIAN ASSISTANT

## 2024-05-25 PROCEDURE — 272N000001 HC OR GENERAL SUPPLY STERILE: Performed by: INTERNAL MEDICINE

## 2024-05-25 PROCEDURE — 36415 COLL VENOUS BLD VENIPUNCTURE: CPT | Performed by: STUDENT IN AN ORGANIZED HEALTH CARE EDUCATION/TRAINING PROGRAM

## 2024-05-25 PROCEDURE — 85027 COMPLETE CBC AUTOMATED: CPT | Performed by: PHYSICIAN ASSISTANT

## 2024-05-25 PROCEDURE — 84484 ASSAY OF TROPONIN QUANT: CPT | Performed by: PHYSICIAN ASSISTANT

## 2024-05-25 PROCEDURE — B241ZZ3 ULTRASONOGRAPHY OF MULTIPLE CORONARY ARTERIES, INTRAVASCULAR: ICD-10-PCS | Performed by: INTERNAL MEDICINE

## 2024-05-25 PROCEDURE — C1887 CATHETER, GUIDING: HCPCS | Performed by: INTERNAL MEDICINE

## 2024-05-25 PROCEDURE — 83735 ASSAY OF MAGNESIUM: CPT | Performed by: STUDENT IN AN ORGANIZED HEALTH CARE EDUCATION/TRAINING PROGRAM

## 2024-05-25 PROCEDURE — C1753 CATH, INTRAVAS ULTRASOUND: HCPCS | Performed by: INTERNAL MEDICINE

## 2024-05-25 PROCEDURE — 85027 COMPLETE CBC AUTOMATED: CPT | Performed by: STUDENT IN AN ORGANIZED HEALTH CARE EDUCATION/TRAINING PROGRAM

## 2024-05-25 PROCEDURE — 027334Z DILATION OF CORONARY ARTERY, FOUR OR MORE ARTERIES WITH DRUG-ELUTING INTRALUMINAL DEVICE, PERCUTANEOUS APPROACH: ICD-10-PCS | Performed by: INTERNAL MEDICINE

## 2024-05-25 PROCEDURE — 93458 L HRT ARTERY/VENTRICLE ANGIO: CPT | Mod: 26 | Performed by: INTERNAL MEDICINE

## 2024-05-25 PROCEDURE — 99223 1ST HOSP IP/OBS HIGH 75: CPT | Mod: 25 | Performed by: INTERNAL MEDICINE

## 2024-05-25 PROCEDURE — 999N000147 HC STATISTIC PT IP EVAL DEFER

## 2024-05-25 PROCEDURE — 250N000013 HC RX MED GY IP 250 OP 250 PS 637: Performed by: INTERNAL MEDICINE

## 2024-05-25 PROCEDURE — 99152 MOD SED SAME PHYS/QHP 5/>YRS: CPT | Mod: GC | Performed by: INTERNAL MEDICINE

## 2024-05-25 PROCEDURE — 255N000002 HC RX 255 OP 636: Performed by: STUDENT IN AN ORGANIZED HEALTH CARE EDUCATION/TRAINING PROGRAM

## 2024-05-25 PROCEDURE — 92978 ENDOLUMINL IVUS OCT C 1ST: CPT | Performed by: INTERNAL MEDICINE

## 2024-05-25 PROCEDURE — 92978 ENDOLUMINL IVUS OCT C 1ST: CPT | Mod: 26 | Performed by: INTERNAL MEDICINE

## 2024-05-25 PROCEDURE — 92979 ENDOLUMINL IVUS OCT C EA: CPT | Mod: LC | Performed by: INTERNAL MEDICINE

## 2024-05-25 PROCEDURE — C1769 GUIDE WIRE: HCPCS | Performed by: INTERNAL MEDICINE

## 2024-05-25 PROCEDURE — 93458 L HRT ARTERY/VENTRICLE ANGIO: CPT | Performed by: INTERNAL MEDICINE

## 2024-05-25 PROCEDURE — 84100 ASSAY OF PHOSPHORUS: CPT | Performed by: STUDENT IN AN ORGANIZED HEALTH CARE EDUCATION/TRAINING PROGRAM

## 2024-05-25 PROCEDURE — 80048 BASIC METABOLIC PNL TOTAL CA: CPT | Performed by: STUDENT IN AN ORGANIZED HEALTH CARE EDUCATION/TRAINING PROGRAM

## 2024-05-25 PROCEDURE — 99223 1ST HOSP IP/OBS HIGH 75: CPT | Performed by: PHYSICIAN ASSISTANT

## 2024-05-25 PROCEDURE — 93010 ELECTROCARDIOGRAM REPORT: CPT | Mod: XU | Performed by: INTERNAL MEDICINE

## 2024-05-25 PROCEDURE — 250N000011 HC RX IP 250 OP 636: Performed by: STUDENT IN AN ORGANIZED HEALTH CARE EDUCATION/TRAINING PROGRAM

## 2024-05-25 PROCEDURE — 258N000003 HC RX IP 258 OP 636: Performed by: INTERNAL MEDICINE

## 2024-05-25 PROCEDURE — 250N000013 HC RX MED GY IP 250 OP 250 PS 637: Performed by: STUDENT IN AN ORGANIZED HEALTH CARE EDUCATION/TRAINING PROGRAM

## 2024-05-25 PROCEDURE — 250N000009 HC RX 250: Performed by: INTERNAL MEDICINE

## 2024-05-25 PROCEDURE — 92928 PRQ TCAT PLMT NTRAC ST 1 LES: CPT | Mod: LD | Performed by: INTERNAL MEDICINE

## 2024-05-25 PROCEDURE — 92928 PRQ TCAT PLMT NTRAC ST 1 LES: CPT | Mod: 76 | Performed by: INTERNAL MEDICINE

## 2024-05-25 DEVICE — STENT CORONARY DES SYNERGY XD MR US 4.00X20MM H7493941820400: Type: IMPLANTABLE DEVICE | Status: FUNCTIONAL

## 2024-05-25 DEVICE — STENT CORONARY DES SYNERGY XD MR US 2.50X38MM H7493941838250: Type: IMPLANTABLE DEVICE | Status: FUNCTIONAL

## 2024-05-25 DEVICE — STENT CORONARY DES SYNERGY XD MR US 2.50X16MM H7493941816250: Type: IMPLANTABLE DEVICE | Status: FUNCTIONAL

## 2024-05-25 DEVICE — STENT CORONARY DES SYNERGY XD MR US 3.00X12MM H7493941812300: Type: IMPLANTABLE DEVICE | Status: FUNCTIONAL

## 2024-05-25 RX ORDER — LIDOCAINE 40 MG/G
CREAM TOPICAL
Status: DISCONTINUED | OUTPATIENT
Start: 2024-05-25 | End: 2024-05-25 | Stop reason: HOSPADM

## 2024-05-25 RX ORDER — BISACODYL 10 MG
10 SUPPOSITORY, RECTAL RECTAL DAILY PRN
Status: DISCONTINUED | OUTPATIENT
Start: 2024-05-25 | End: 2024-05-26 | Stop reason: HOSPADM

## 2024-05-25 RX ORDER — HYDRALAZINE HYDROCHLORIDE 20 MG/ML
10 INJECTION INTRAMUSCULAR; INTRAVENOUS EVERY 4 HOURS PRN
Status: DISCONTINUED | OUTPATIENT
Start: 2024-05-25 | End: 2024-05-26 | Stop reason: HOSPADM

## 2024-05-25 RX ORDER — LIDOCAINE 40 MG/G
CREAM TOPICAL
Status: DISCONTINUED | OUTPATIENT
Start: 2024-05-25 | End: 2024-05-26 | Stop reason: HOSPADM

## 2024-05-25 RX ORDER — ACETAMINOPHEN 325 MG/1
650 TABLET ORAL EVERY 4 HOURS PRN
Status: DISCONTINUED | OUTPATIENT
Start: 2024-05-25 | End: 2024-05-26 | Stop reason: HOSPADM

## 2024-05-25 RX ORDER — OXYCODONE HYDROCHLORIDE 5 MG/1
5 TABLET ORAL EVERY 4 HOURS PRN
Status: DISCONTINUED | OUTPATIENT
Start: 2024-05-25 | End: 2024-05-26 | Stop reason: HOSPADM

## 2024-05-25 RX ORDER — IOPAMIDOL 755 MG/ML
INJECTION, SOLUTION INTRAVASCULAR
Status: DISCONTINUED | OUTPATIENT
Start: 2024-05-25 | End: 2024-05-25 | Stop reason: HOSPADM

## 2024-05-25 RX ORDER — NITROGLYCERIN 5 MG/ML
VIAL (ML) INTRAVENOUS
Status: DISCONTINUED | OUTPATIENT
Start: 2024-05-25 | End: 2024-05-25 | Stop reason: HOSPADM

## 2024-05-25 RX ORDER — ROSUVASTATIN CALCIUM 20 MG/1
20 TABLET, COATED ORAL AT BEDTIME
Status: DISCONTINUED | OUTPATIENT
Start: 2024-05-25 | End: 2024-05-26 | Stop reason: HOSPADM

## 2024-05-25 RX ORDER — NALOXONE HYDROCHLORIDE 0.4 MG/ML
0.2 INJECTION, SOLUTION INTRAMUSCULAR; INTRAVENOUS; SUBCUTANEOUS
Status: DISCONTINUED | OUTPATIENT
Start: 2024-05-25 | End: 2024-05-26 | Stop reason: HOSPADM

## 2024-05-25 RX ORDER — NICOTINE POLACRILEX 4 MG
15-30 LOZENGE BUCCAL
Status: DISCONTINUED | OUTPATIENT
Start: 2024-05-25 | End: 2024-05-26 | Stop reason: HOSPADM

## 2024-05-25 RX ORDER — POLYETHYLENE GLYCOL 3350 17 G/17G
17 POWDER, FOR SOLUTION ORAL 2 TIMES DAILY PRN
Status: DISCONTINUED | OUTPATIENT
Start: 2024-05-25 | End: 2024-05-26 | Stop reason: HOSPADM

## 2024-05-25 RX ORDER — OXYCODONE HYDROCHLORIDE 5 MG/1
5 TABLET ORAL EVERY 4 HOURS PRN
Status: DISCONTINUED | OUTPATIENT
Start: 2024-05-25 | End: 2024-05-25

## 2024-05-25 RX ORDER — FENTANYL CITRATE 50 UG/ML
INJECTION, SOLUTION INTRAMUSCULAR; INTRAVENOUS
Status: DISCONTINUED | OUTPATIENT
Start: 2024-05-25 | End: 2024-05-25 | Stop reason: HOSPADM

## 2024-05-25 RX ORDER — ONDANSETRON 2 MG/ML
4 INJECTION INTRAMUSCULAR; INTRAVENOUS EVERY 6 HOURS PRN
Status: DISCONTINUED | OUTPATIENT
Start: 2024-05-25 | End: 2024-05-25

## 2024-05-25 RX ORDER — ASPIRIN 81 MG/1
81 TABLET, CHEWABLE ORAL ONCE
Status: DISCONTINUED | OUTPATIENT
Start: 2024-05-25 | End: 2024-05-25

## 2024-05-25 RX ORDER — NALOXONE HYDROCHLORIDE 0.4 MG/ML
0.4 INJECTION, SOLUTION INTRAMUSCULAR; INTRAVENOUS; SUBCUTANEOUS
Status: DISCONTINUED | OUTPATIENT
Start: 2024-05-25 | End: 2024-05-25

## 2024-05-25 RX ORDER — DEXTROSE MONOHYDRATE 25 G/50ML
25-50 INJECTION, SOLUTION INTRAVENOUS
Status: DISCONTINUED | OUTPATIENT
Start: 2024-05-25 | End: 2024-05-26 | Stop reason: HOSPADM

## 2024-05-25 RX ORDER — SODIUM CHLORIDE 9 MG/ML
INJECTION, SOLUTION INTRAVENOUS CONTINUOUS
Status: DISCONTINUED | OUTPATIENT
Start: 2024-05-25 | End: 2024-05-25 | Stop reason: HOSPADM

## 2024-05-25 RX ORDER — AMOXICILLIN 250 MG
1 CAPSULE ORAL 2 TIMES DAILY PRN
Status: DISCONTINUED | OUTPATIENT
Start: 2024-05-25 | End: 2024-05-26 | Stop reason: HOSPADM

## 2024-05-25 RX ORDER — HYDROMORPHONE HCL IN WATER/PF 6 MG/30 ML
0.4 PATIENT CONTROLLED ANALGESIA SYRINGE INTRAVENOUS
Status: DISCONTINUED | OUTPATIENT
Start: 2024-05-25 | End: 2024-05-26 | Stop reason: HOSPADM

## 2024-05-25 RX ORDER — ONDANSETRON 4 MG/1
4 TABLET, ORALLY DISINTEGRATING ORAL EVERY 6 HOURS PRN
Status: DISCONTINUED | OUTPATIENT
Start: 2024-05-25 | End: 2024-05-25

## 2024-05-25 RX ORDER — ATROPINE SULFATE 0.1 MG/ML
0.5 INJECTION INTRAVENOUS
Status: ACTIVE | OUTPATIENT
Start: 2024-05-25 | End: 2024-05-26

## 2024-05-25 RX ORDER — AMOXICILLIN 250 MG
2 CAPSULE ORAL 2 TIMES DAILY PRN
Status: DISCONTINUED | OUTPATIENT
Start: 2024-05-25 | End: 2024-05-26 | Stop reason: HOSPADM

## 2024-05-25 RX ORDER — NALOXONE HYDROCHLORIDE 0.4 MG/ML
0.4 INJECTION, SOLUTION INTRAMUSCULAR; INTRAVENOUS; SUBCUTANEOUS
Status: DISCONTINUED | OUTPATIENT
Start: 2024-05-25 | End: 2024-05-26 | Stop reason: HOSPADM

## 2024-05-25 RX ORDER — HYDRALAZINE HYDROCHLORIDE 20 MG/ML
INJECTION INTRAMUSCULAR; INTRAVENOUS
Status: DISCONTINUED | OUTPATIENT
Start: 2024-05-25 | End: 2024-05-25 | Stop reason: HOSPADM

## 2024-05-25 RX ORDER — OXYCODONE HYDROCHLORIDE 5 MG/1
10 TABLET ORAL EVERY 4 HOURS PRN
Status: DISCONTINUED | OUTPATIENT
Start: 2024-05-25 | End: 2024-05-25

## 2024-05-25 RX ORDER — ONDANSETRON 4 MG/1
4 TABLET, ORALLY DISINTEGRATING ORAL EVERY 6 HOURS PRN
Status: DISCONTINUED | OUTPATIENT
Start: 2024-05-25 | End: 2024-05-26 | Stop reason: HOSPADM

## 2024-05-25 RX ORDER — ASPIRIN 325 MG
325 TABLET ORAL ONCE
Status: COMPLETED | OUTPATIENT
Start: 2024-05-25 | End: 2024-05-25

## 2024-05-25 RX ORDER — ASPIRIN 81 MG/1
81 TABLET, CHEWABLE ORAL DAILY
Status: DISCONTINUED | OUTPATIENT
Start: 2024-05-26 | End: 2024-05-26 | Stop reason: HOSPADM

## 2024-05-25 RX ORDER — NALOXONE HYDROCHLORIDE 0.4 MG/ML
0.2 INJECTION, SOLUTION INTRAMUSCULAR; INTRAVENOUS; SUBCUTANEOUS
Status: DISCONTINUED | OUTPATIENT
Start: 2024-05-25 | End: 2024-05-25

## 2024-05-25 RX ORDER — SODIUM CHLORIDE 9 MG/ML
INJECTION, SOLUTION INTRAVENOUS CONTINUOUS
Status: ACTIVE | OUTPATIENT
Start: 2024-05-25 | End: 2024-05-25

## 2024-05-25 RX ORDER — VERAPAMIL HYDROCHLORIDE 2.5 MG/ML
INJECTION, SOLUTION INTRAVENOUS
Status: DISCONTINUED | OUTPATIENT
Start: 2024-05-25 | End: 2024-05-25 | Stop reason: HOSPADM

## 2024-05-25 RX ORDER — METOPROLOL TARTRATE 1 MG/ML
5 INJECTION, SOLUTION INTRAVENOUS
Status: DISCONTINUED | OUTPATIENT
Start: 2024-05-25 | End: 2024-05-26 | Stop reason: HOSPADM

## 2024-05-25 RX ORDER — HYDROMORPHONE HCL IN WATER/PF 6 MG/30 ML
0.2 PATIENT CONTROLLED ANALGESIA SYRINGE INTRAVENOUS
Status: DISCONTINUED | OUTPATIENT
Start: 2024-05-25 | End: 2024-05-26 | Stop reason: HOSPADM

## 2024-05-25 RX ORDER — ACETAMINOPHEN 650 MG/1
650 SUPPOSITORY RECTAL EVERY 4 HOURS PRN
Status: DISCONTINUED | OUTPATIENT
Start: 2024-05-25 | End: 2024-05-26 | Stop reason: HOSPADM

## 2024-05-25 RX ORDER — HEPARIN SODIUM 1000 [USP'U]/ML
INJECTION, SOLUTION INTRAVENOUS; SUBCUTANEOUS
Status: DISCONTINUED | OUTPATIENT
Start: 2024-05-25 | End: 2024-05-25 | Stop reason: HOSPADM

## 2024-05-25 RX ORDER — FLUMAZENIL 0.1 MG/ML
0.2 INJECTION, SOLUTION INTRAVENOUS
Status: ACTIVE | OUTPATIENT
Start: 2024-05-25 | End: 2024-05-26

## 2024-05-25 RX ORDER — ASPIRIN 81 MG/1
81 TABLET, CHEWABLE ORAL DAILY
DISCHARGE
Start: 2024-05-25

## 2024-05-25 RX ORDER — ACETAMINOPHEN 325 MG/1
650 TABLET ORAL EVERY 4 HOURS PRN
Status: DISCONTINUED | OUTPATIENT
Start: 2024-05-25 | End: 2024-05-25

## 2024-05-25 RX ORDER — ONDANSETRON 2 MG/ML
4 INJECTION INTRAMUSCULAR; INTRAVENOUS EVERY 6 HOURS PRN
Status: DISCONTINUED | OUTPATIENT
Start: 2024-05-25 | End: 2024-05-26 | Stop reason: HOSPADM

## 2024-05-25 RX ORDER — METOPROLOL TARTRATE 25 MG/1
25 TABLET, FILM COATED ORAL 2 TIMES DAILY
Status: DISCONTINUED | OUTPATIENT
Start: 2024-05-25 | End: 2024-05-26 | Stop reason: HOSPADM

## 2024-05-25 RX ORDER — ASPIRIN 81 MG/1
81 TABLET ORAL DAILY
Status: DISCONTINUED | OUTPATIENT
Start: 2024-05-26 | End: 2024-05-25

## 2024-05-25 RX ORDER — NITROGLYCERIN 0.4 MG/1
0.4 TABLET SUBLINGUAL EVERY 5 MIN PRN
Status: DISCONTINUED | OUTPATIENT
Start: 2024-05-25 | End: 2024-05-26 | Stop reason: HOSPADM

## 2024-05-25 RX ORDER — HEPARIN SODIUM 10000 [USP'U]/100ML
0-5000 INJECTION, SOLUTION INTRAVENOUS CONTINUOUS
Status: DISCONTINUED | OUTPATIENT
Start: 2024-05-25 | End: 2024-05-25

## 2024-05-25 RX ORDER — CALCIUM CARBONATE 500 MG/1
1000 TABLET, CHEWABLE ORAL 4 TIMES DAILY PRN
Status: DISCONTINUED | OUTPATIENT
Start: 2024-05-25 | End: 2024-05-26 | Stop reason: HOSPADM

## 2024-05-25 RX ORDER — LORAZEPAM 2 MG/ML
0.5 INJECTION INTRAMUSCULAR
Status: DISCONTINUED | OUTPATIENT
Start: 2024-05-25 | End: 2024-05-25 | Stop reason: HOSPADM

## 2024-05-25 RX ORDER — LISINOPRIL 10 MG/1
10 TABLET ORAL DAILY
Status: DISCONTINUED | OUTPATIENT
Start: 2024-05-26 | End: 2024-05-26

## 2024-05-25 RX ORDER — FENTANYL CITRATE 50 UG/ML
25 INJECTION, SOLUTION INTRAMUSCULAR; INTRAVENOUS
Status: DISCONTINUED | OUTPATIENT
Start: 2024-05-25 | End: 2024-05-26 | Stop reason: HOSPADM

## 2024-05-25 RX ORDER — ASPIRIN 81 MG/1
243 TABLET, CHEWABLE ORAL ONCE
Status: COMPLETED | OUTPATIENT
Start: 2024-05-25 | End: 2024-05-25

## 2024-05-25 RX ORDER — POTASSIUM CHLORIDE 1500 MG/1
20 TABLET, EXTENDED RELEASE ORAL
Status: DISCONTINUED | OUTPATIENT
Start: 2024-05-25 | End: 2024-05-25 | Stop reason: HOSPADM

## 2024-05-25 RX ORDER — LORAZEPAM 0.5 MG/1
0.5 TABLET ORAL
Status: DISCONTINUED | OUTPATIENT
Start: 2024-05-25 | End: 2024-05-25 | Stop reason: HOSPADM

## 2024-05-25 RX ADMIN — HEPARIN SODIUM 1150 UNITS/HR: 10000 INJECTION, SOLUTION INTRAVENOUS at 12:35

## 2024-05-25 RX ADMIN — POTASSIUM & SODIUM PHOSPHATES POWDER PACK 280-160-250 MG 1 PACKET: 280-160-250 PACK at 02:01

## 2024-05-25 RX ADMIN — ASPIRIN 81 MG CHEWABLE TABLET 81 MG: 81 TABLET CHEWABLE at 08:38

## 2024-05-25 RX ADMIN — POTASSIUM & SODIUM PHOSPHATES POWDER PACK 280-160-250 MG 1 PACKET: 280-160-250 PACK at 05:54

## 2024-05-25 RX ADMIN — LISINOPRIL 10 MG: 10 TABLET ORAL at 08:38

## 2024-05-25 RX ADMIN — METOPROLOL TARTRATE 25 MG: 25 TABLET, FILM COATED ORAL at 08:38

## 2024-05-25 RX ADMIN — ASPIRIN 81 MG CHEWABLE TABLET 243 MG: 81 TABLET CHEWABLE at 13:04

## 2024-05-25 RX ADMIN — ROSUVASTATIN CALCIUM 20 MG: 20 TABLET, FILM COATED ORAL at 20:29

## 2024-05-25 RX ADMIN — METOPROLOL TARTRATE 25 MG: 25 TABLET, FILM COATED ORAL at 20:29

## 2024-05-25 RX ADMIN — HEPARIN SODIUM 1150 UNITS/HR: 10000 INJECTION, SOLUTION INTRAVENOUS at 10:43

## 2024-05-25 RX ADMIN — SODIUM CHLORIDE: 9 INJECTION, SOLUTION INTRAVENOUS at 13:06

## 2024-05-25 RX ADMIN — HUMAN ALBUMIN MICROSPHERES AND PERFLUTREN 3 ML: 10; .22 INJECTION, SOLUTION INTRAVENOUS at 08:15

## 2024-05-25 RX ADMIN — INSULIN GLARGINE 15 UNITS: 100 INJECTION, SOLUTION SUBCUTANEOUS at 21:38

## 2024-05-25 RX ADMIN — INSULIN ASPART 1 UNITS: 100 INJECTION, SOLUTION INTRAVENOUS; SUBCUTANEOUS at 17:55

## 2024-05-25 ASSESSMENT — ACTIVITIES OF DAILY LIVING (ADL)
ADLS_ACUITY_SCORE: 21
ADLS_ACUITY_SCORE: 26
FALL_HISTORY_WITHIN_LAST_SIX_MONTHS: NO
ADLS_ACUITY_SCORE: 21
WALKING_OR_CLIMBING_STAIRS_DIFFICULTY: NO
ADLS_ACUITY_SCORE: 26
ADLS_ACUITY_SCORE: 26
ADLS_ACUITY_SCORE: 21
ADLS_ACUITY_SCORE: 25
ADLS_ACUITY_SCORE: 27
CONCENTRATING,_REMEMBERING_OR_MAKING_DECISIONS_DIFFICULTY: NO
ADLS_ACUITY_SCORE: 25
ADLS_ACUITY_SCORE: 25
DIFFICULTY_EATING/SWALLOWING: NO
VISION_MANAGEMENT: GLASSES
WEAR_GLASSES_OR_BLIND: YES
TOILETING_ISSUES: NO
ADLS_ACUITY_SCORE: 27
DOING_ERRANDS_INDEPENDENTLY_DIFFICULTY: NO
ADLS_ACUITY_SCORE: 27
ADLS_ACUITY_SCORE: 21
DRESSING/BATHING_DIFFICULTY: NO
ADLS_ACUITY_SCORE: 27
ADLS_ACUITY_SCORE: 25
ADLS_ACUITY_SCORE: 21
ADLS_ACUITY_SCORE: 38
ADLS_ACUITY_SCORE: 23
DIFFICULTY_COMMUNICATING: NO
ADLS_ACUITY_SCORE: 36
ADLS_ACUITY_SCORE: 21
HEARING_DIFFICULTY_OR_DEAF: NO
ADLS_ACUITY_SCORE: 36
ADLS_ACUITY_SCORE: 25
CHANGE_IN_FUNCTIONAL_STATUS_SINCE_ONSET_OF_CURRENT_ILLNESS/INJURY: NO
ADLS_ACUITY_SCORE: 25

## 2024-05-25 NOTE — PROVIDER NOTIFICATION
"MD (Jesussanto) paged: \"Paging for critical value, trop is 952. Pt denies CP. Still on heparin gtt.\"  "

## 2024-05-25 NOTE — Clinical Note
Stent deployed in the left anterior descending. Max pressure = 12 graciela. Total duration = 25 seconds.

## 2024-05-25 NOTE — DISCHARGE SUMMARY
[facility]    Discharge Summary  Hospitalist    Date of Admission:  5/24/2024  Date of Discharge:  5/25/2024  Discharging Provider: Kimberli Velazco MD  Date of Service (when I saw the patient): 05/25/24    Discharge Diagnoses   NSTEMI  Diabetes mellitus type 2, uncontrolled  Essential hypertension    History of Present Illness   Kaden Cain is a 74 year old man who was admitted on 5/24/2024. PMH significant for diabetes mellitus type 2 and essential hypertension.     He he presented to ER with sudden onset chest pressure with radiation to the left shoulder starting around 11:30 AM.  It was associated with some nausea and diaphoresis and patient struggled with the symptoms for about 2 hours and the subsided and he was able to take a nap.  When his wife came back from work she advised her to come to the ER.  His symptoms had pretty much resolved by the time he came to the ER.     Vitals on presentation: Temperature 97.40 Fahrenheit, heart rate 95/min, blood pressure 167/118, respirate rate of 18 and oxygen saturation of 99% on room air.     His EKG showed nonspecific ST changes.  First troponin was elevated at 265.  Rest of the CBC and BMP were unremarkable except glucose of 390.    Hospital Course   Kaden Cain was admitted on 5/24/2024.  The following problems were addressed during his hospitalization:     NSTEMI.  Started on heparin drip and aspirin.  Cardiology consult. Cardiology recommends transfer to [facility].  Echo completed at [facility], report pending.   LDL 98. Added Crestor 20 mg nightly.  Patient did eat 5/25 morning. Recommending NPO pending further cardiology recommendations. Continuing heparin drip on transfer.  5/24 10 PM troponin increased to 771. Level from 5/25 morning is pending.   Chest pain free at time of exam and transfer.      Diabetes mellitus type 2 uncontrolled.  Blood sugar on presentation was 390.  Blood sugar appears to be uncontrolled at baseline to  with patient and his blood sugars are often around 200.  Hgb A1c 9%.  Hold prior to admission dose of glipizide and metformin. Lantus 15 units at night was started on admission at [facility] with NovoLog 5 units Premeal and sliding scale. Would adjust on transfer for NPO status and follow.     Essential hypertension.  PTA amlodipine was held on admission. Patient started on lisinopril 10 mg daily with additional metoprolol tartrate 25 mg BID.   In setting of NSTEMI pending echo evaluation, suspect there will be further adjustments by cardiology.    Kimberli Velazco MD FACP  Hospitalist Service  [facility]      Significant Results and Procedures   Increasing troponin values.    Pending Results   Pending troponin value and echo report. [facility] team to follow.    Code Status   Full Code       Primary Care Physician   No primary care provider on file.    Physical Exam   Temp: 98  F (36.7  C) Temp src: Oral BP: 138/73 Pulse: 66   Resp: 16 SpO2: 97 % O2 Device: None (Room air)    Vitals:    05/24/24 1639 05/24/24 1939   Weight: 93.8 kg (206 lb 12.7 oz) 93.8 kg (206 lb 11.2 oz)     Vital Signs with Ranges  Temp:  [97.4  F (36.3  C)-98.1  F (36.7  C)] 98  F (36.7  C)  Pulse:  [65-95] 66  Resp:  [11-22] 16  BP: (132-167)/() 138/73  SpO2:  [96 %-99 %] 97 %  I/O last 3 completed shifts:  In: -   Out: 300 [Urine:300]    Constitutional: Pleasant gentleman resting in bed with echo and family at bedside. Patient is alert and oriented x3. No acute distress. Non-toxic. No diaphoresis.  HEENT: NCAT. EOMI. Moist oral mucosa.  Respiratory: Clear to auscultation bilaterally. No crackles or wheezes.  Cardiovascular: Regular rate and rhythm at time of exam. No murmur.  GI: Soft, nontender, nondistended.   Musculoskeletal: No gross deformities.   Neurologic: Alert and oriented x3. No focal neurologic deficits. Did not assess gait.    Discharge Disposition   Transferred to [facility]  Condition at discharge:  Fair    Consultations This Hospital Stay   PHARMACY IP CONSULT  CARDIAC REHAB IP CONSULT  CARDIOLOGY IP CONSULT  SMOKING CESSATION PROGRAM IP CONSULT    Time Spent on this Encounter   I, Kimberli Velazco MD, personally saw the patient today and spent greater than 30 minutes discharging this patient.    Discharge Orders      Reason for your hospital stay    Hospitalized for NSTEMI with increasing troponin. Cardiology recommending transfer to [facility].     Activity - Up with nursing assistance     Follow Up    Follow up with PCP within 1 week of hospital discharge. Follow up with cardiology per their recommendations.     Full Code     Diet    Follow this diet upon discharge: No food or drink until OK per cardiology.     Discharge Medications   Patient not discharging. Transferring to [facility] for continued care.    Allergies   No Known Allergies    Data      Latest Reference Range & Units 05/24/24 16:54 05/24/24 19:56 05/24/24 22:00   Troponin T, High Sensitivity <=22 ng/L 265 (HH) 592 (HH) 771 ()   (): Data is critically high    Most Recent 3 CBC's:  Recent Labs   Lab Test 05/25/24  0554 05/24/24  1654   WBC 7.4 7.6   HGB 14.0 14.5   MCV 90 88    315      Most Recent 3 BMP's:  Recent Labs   Lab Test 05/25/24  0745 05/25/24  0554 05/25/24  0205 05/24/24  1808 05/24/24  1654   NA  --  140  --   --  134*   POTASSIUM  --  4.3  --   --  4.4   CHLORIDE  --  103  --   --  98   CO2  --  25  --   --  23   BUN  --  15.4  --   --  17.1   CR  --  0.99  --   --  0.95   ANIONGAP  --  12  --   --  13   BIRGIT  --  8.9  --   --  9.0   * 119* 110*   < > 390*    < > = values in this interval not displayed.     Most Recent Cholesterol Panel:  Recent Labs   Lab Test 05/24/24  1654   CHOL 167   LDL 98   HDL 45   TRIG 119     Most Recent TSH, T4 and A1c Labs:  Recent Labs   Lab Test 05/24/24  1654   A1C 9.0*     Results for orders placed or performed during the hospital encounter of 05/24/24   Chest XR,  PA & LAT     Narrative    EXAM: XR CHEST 2 VIEWS  LOCATION: [facility]  DATE: 5/24/2024    INDICATION: chest pain  COMPARISON: None.      Impression    IMPRESSION:     Heart size is normal. Lungs are clear bilaterally. Mediastinum and visualized bony structures are unremarkable.

## 2024-05-25 NOTE — PLAN OF CARE
Goal Outcome Evaluation:      Plan of Care Reviewed With: patient, spouse, child        Direct admit from [facility], arrived to unit at 1145. A&O x 4. VSS, O2 stable on RA. Denies chest pain. NPO with plan for cath lab this afternoon. Hep gtt per orders. Wife and daughters at bedside.     Pt arrived to unit at 1600 from cath lab, per report 4 stents placed. R radial site WDL, CMS intact. TR band in place, bedrest until 1800. BP initially elevated, prn hydralazine not needed at this time. TR band air out starting at 1822. Plan of care ongoing.

## 2024-05-25 NOTE — Clinical Note
The first balloon was inserted into the left anterior descending.Max pressure = 14 graciela. Total duration = 18 seconds.     Max pressure = 14 graciela. Total duration = 15 seconds.    Balloon reinflated a second time: Max pressure = 14 graciela. Total duration = 15 seconds.  Balloon reinflated a third time: Max pressure = 14 graciela. Total duration = 15 seconds.  Balloon reinflated a fourth time: Max pressure = 14 graciela. Total duration = 15 seconds.

## 2024-05-25 NOTE — Clinical Note
The first balloon was inserted into the left anterior descending.Max pressure = 12 graciela. Total duration = 20 seconds.     Max pressure = 12 graciela. Total duration = 15 seconds.    Balloon reinflated a second time: Max pressure = 12 graciela. Total duration = 15 seconds.

## 2024-05-25 NOTE — Clinical Note
The first balloon was inserted into the circumflex.Max pressure = 12 graciela. Total duration = 30 seconds.

## 2024-05-25 NOTE — UTILIZATION REVIEW
Admission Status; Secondary Review Determination       Under the authority of the Utilization Management Committee, the utilization review process indicated a secondary review on the above patient. The review outcome is based on review of the medical records, discussions with staff, and applying clinical experience noted on the date of the review.     (x) Inpatient Status Appropriate - This patient's medical care is consistent with medical management for inpatient care and reasonable inpatient medical practice.     RATIONALE FOR DETERMINATION   75 yo man  diagnosed with non-ST elevation myocardial infarction (NSTEMI), uncontrolled diabetes, and hypertension. The echocardiogram shows a resting wall motion abnormality indicative of possible disease in the circumflex or diagonal territory. Given the clinical symptoms, elevated troponin, and abnormal ECG, a transfer to the [facility] or [facility] is recommended for further management, including an angiogram later today or tomorrow. The patient is currently chest pain-free. The plan includes continuing aspirin, statin, and intravenous heparin, with immediate notification of any changes in clinical status. The Cath Lab team and interventional attending have been informed, and a bed is being arranged.  The expected length of stay at the time of admission was more than 2 nights because of the severity of illness, intensity of service provided, and risk for adverse outcome. Inpatient admission is appropriate.         This document was produced using voice recognition software       The information on this document is developed by the utilization review team in order for the business office to ensure compliance. This only denotes the appropriateness of proper admission status and does not reflect the quality of care rendered.   The definitions of Inpatient Status and Observation Status used in making the determination above are those provided in the  CMS Coverage Manual, Chapter 1 and Chapter 6, section 70.4.   Sincerely,   TORRIE LAU MD   System Medical Director   Utilization Management   [facility].

## 2024-05-25 NOTE — Clinical Note
The first balloon was inserted into the circumflex.Max pressure = 14 graciela. Total duration = 20 seconds.     Max pressure = 14 graciela. Total duration = 15 seconds.    Balloon reinflated a second time: Max pressure = 14 graciela. Total duration = 15 seconds.  Balloon reinflated a third time: Max pressure = 16 graciela. Total duration = 20 seconds.

## 2024-05-25 NOTE — PLAN OF CARE
Physical Therapy: Orders received. Chart reviewed and discussed with care team.? Physical Therapy not indicated due to pt transferring to [facility].? Defer discharge recommendations to next level of care.? Will complete orders.

## 2024-05-25 NOTE — PLAN OF CARE
"Activity: SBA  Neuro:  A&Ox4. Denies N/V/H,  Cardiac: on tele: SR w/1AVB. Denies CP.  Resp: on RA with O2 sats <90%. Denies SOB  GI/:  WNL. Urinal at bedside.  Diet: low fat/na diet  Skin: WNL  LDAs: R PIV infusing hept gtt at 11.5 units/hr. 2nd draw at 0600.   Pain: denies  PRNs: none  Labs: Hep Xa 0.26. , K 4.6, Mg, 2.2, phos 3.7, Recheck for AM 5/26    Consults: cardiology     Major Shift Events:  Phosphorus packet 2/2 replacement overnight, redraw at 0800. Admission profile completed    Plan: Continue with plan of care  For vital signs and complete assessments, please see documentation under flowsheets.    Britt Choi RN    Goal Outcome Evaluation:      Plan of Care Reviewed With: patient    Overall Patient Progress: no changeOverall Patient Progress: no change    Outcome Evaluation: continue hep gtt, trop trending upwards, denies CP, cardiology consulted and ECHO to do in the AM      Problem: Adult Inpatient Plan of Care  Goal: Plan of Care Review  Description: The Plan of Care Review/Shift note should be completed every shift.  The Outcome Evaluation is a brief statement about your assessment that the patient is improving, declining, or no change.  This information will be displayed automatically on your shift  note.  Outcome: Progressing  Flowsheets (Taken 5/25/2024 0533)  Outcome Evaluation: continue hep gtt, trop trending upwards, denies CP, cardiology consulted and ECHO to do in the AM  Plan of Care Reviewed With: patient  Overall Patient Progress: no change  Goal: Patient-Specific Goal (Individualized)  Description: You can add care plan individualizations to a care plan. Examples of Individualization might be:  \"Parent requests to be called daily at 9am for status\", \"I have a hard time hearing out of my right ear\", or \"Do not touch me to wake me up as it startles  me\".  Outcome: Progressing  Goal: Absence of Hospital-Acquired Illness or Injury  Outcome: Progressing  Intervention: Identify and " Manage Fall Risk  Recent Flowsheet Documentation  Taken 5/25/2024 0316 by Charito Choi RN  Safety Promotion/Fall Prevention: safety round/check completed  Goal: Optimal Comfort and Wellbeing  Outcome: Progressing  Goal: Readiness for Transition of Care  Outcome: Progressing     Problem: Chest Pain  Goal: Resolution of Chest Pain Symptoms  Outcome: Progressing  Intervention: Manage Acute Chest Pain  Recent Flowsheet Documentation  Taken 5/25/2024 0316 by Charito Choi RN  Chest Pain Intervention: (denies)   cardiac monitoring continued   cardiac biomarkers drawn   other (see comments)     Problem: Comorbidity Management  Goal: Blood Glucose Levels Within Targeted Range  Outcome: Progressing  Goal: Maintenance of Heart Failure Symptom Control  Outcome: Progressing  Goal: Blood Pressure in Desired Range  Outcome: Progressing     Problem: Diabetes  Goal: Optimal Coping  Outcome: Progressing  Goal: Optimal Functional Ability  Outcome: Progressing  Goal: Blood Glucose Level Within Target Range  Outcome: Progressing  Goal: Minimize Risk of Hypoglycemia  Outcome: Progressing

## 2024-05-25 NOTE — CONSULTS
[facility]    Cardiology Consultation     Date of Admission:  5/24/2024    Review of the result(s) of each unique test - Last ECG echocardiogram CBC BMP.     Assessment & Plan   Kaden Cain is a 74 year old male who was admitted on 5/24/2024.    Non-ST elevation myocardial infarction  Uncontrolled diabetes  Hypertension    Plan and recommendations  I reviewed the echocardiogram.  Patient has a resting wall motion abnormality concerning for disease in the circumflex or diagonal territory.  In the setting of clinical syndrome symptoms and elevated troponin with abnormal ECG, I recommend transfer to [facility] or [facility].  They have already spoken to bed control and they are awaiting a bed.  Cath Lab team and interventional attending I be notified.  Plan on angiogram later this afternoon or tomorrow morning.  Patient is chest pain-free at this time.  If any change in clinical status please alert me as soon as possible.  Continue aspirin statin and intravenous heparin.    High complexity     BHARGAVI Larsen  Staff Cardiologist  [facility]    History of Present Illness   Kaden Cain is a 74 year old male who presents with resting onset of chest discomfort that started at 11 AM yesterday a.m.  At baseline the patient is reasonably functionally active and denies any known prior cardiovascular history.  The patient has a history of diabetes and hypertension.  He presented to [facility] yesterday with resting onset of chest discomfort.  ECG showed T wave inversions.  Troponin was elevated and has risen over the last 24 hours.  Echocardiography this morning shows lateral anterolateral wall motion abnormality.  He has been chest pain-free in the last 18 hours since he has been in the hospital but troponins are uptrending.  He has been on intravenous heparin aspirin statin and beta-blockers.  Cardiology was consulted for non-ST elevation myocardial  infarction.    Past Medical History   No past medical history on file.    Past Surgical History   No past surgical history on file.    Prior to Admission Medications   Prior to Admission Medications   Prescriptions Last Dose Informant Patient Reported? Taking?   amLODIPine (NORVASC) 5 MG tablet 5/24/2024 at AM  Yes Yes   Sig: Take 1 tablet by mouth daily   glipiZIDE (GLUCOTROL XL) 5 MG 24 hr tablet 5/24/2024 at AM  Yes Yes   Sig: Take 10 mg by mouth daily (with breakfast)   metFORMIN (GLUCOPHAGE XR) 500 MG 24 hr tablet 5/24/2024 at x1  Yes Yes   Sig: Take 1,000 mg by mouth 2 times daily (with meals)      Facility-Administered Medications: None     Current Facility-Administered Medications   Medication Dose Route Frequency Provider Last Rate Last Admin    acetaminophen (TYLENOL) tablet 650 mg  650 mg Oral Q4H PRN Kelvin Antony MD        Or    acetaminophen (TYLENOL) Suppository 650 mg  650 mg Rectal Q4H PRN Kelvin Antony MD        aspirin (ASA) chewable tablet 81 mg  81 mg Oral Daily Kelvin Antony MD   81 mg at 05/25/24 0838    calcium carbonate (TUMS) chewable tablet 1,000 mg  1,000 mg Oral 4x Daily PRN Kelvin Antony MD        glucose gel 15-30 g  15-30 g Oral Q15 Min PRN Kelvin Antony MD        Or    dextrose 50 % injection 25-50 mL  25-50 mL Intravenous Q15 Min PRN Kelvin Antony MD        Or    glucagon injection 1 mg  1 mg Subcutaneous Q15 Min PRN Kelvin Antony MD        heparin 25,000 units in 0.45% NaCl 250 mL ANTICOAGULANT infusion  0-5,000 Units/hr Intravenous Continuous Kelvin Antony MD 11.5 mL/hr at 05/25/24 0712 1,150 Units/hr at 05/25/24 0712    HOLD: nitroGLYcerin IF   Does not apply HOLD Kelvin Antony MD        insulin aspart (NovoLOG) injection (RAPID ACTING)  5 Units Subcutaneous TID w/meals Kelvin Antony MD   5 Units at 05/25/24 0803    insulin aspart (NovoLOG) injection (RAPID ACTING)  1-7 Units Subcutaneous TID AC Kelvin Antony MD   1 Units at 05/25/24 0802    insulin aspart (NovoLOG) injection  (RAPID ACTING)  1-5 Units Subcutaneous At Bedtime Kelvin Antony MD   3 Units at 05/24/24 2114    insulin glargine (LANTUS PEN) injection 15 Units  15 Units Subcutaneous At Bedtime Kelvni Antony MD   15 Units at 05/24/24 2116    lidocaine (LMX4) cream   Topical Q1H PRN Kelvin Antony MD        lidocaine 1 % 0.1-1 mL  0.1-1 mL Other Q1H PRN Kelvin Antony MD        lisinopril (ZESTRIL) tablet 10 mg  10 mg Oral Daily Kelvin Antony MD   10 mg at 05/25/24 0838    medication instruction   Does not apply Continuous PRN Kelvin Antony MD        melatonin tablet 1 mg  1 mg Oral At Bedtime PRN Kelvin Antony MD        metoprolol tartrate (LOPRESSOR) tablet 25 mg  25 mg Oral BID Kelvin Antony MD   25 mg at 05/25/24 0838    morphine (PF) injection 1 mg  1 mg Intravenous Q2H PRN Kelvin Antony MD        naloxone (NARCAN) injection 0.2 mg  0.2 mg Intravenous Q2 Min PRN Kelvin Antony MD        Or    naloxone (NARCAN) injection 0.4 mg  0.4 mg Intravenous Q2 Min PRN Kelvin Antony MD        Or    naloxone (NARCAN) injection 0.2 mg  0.2 mg Intramuscular Q2 Min PRN Kelvin Antony MD        Or    naloxone (NARCAN) injection 0.4 mg  0.4 mg Intramuscular Q2 Min PRN Kelvin Antony MD        ondansetron (ZOFRAN ODT) ODT tab 4 mg  4 mg Oral Q6H PRN Kelvin Antony MD        Or    ondansetron (ZOFRAN) injection 4 mg  4 mg Intravenous Q6H PRN Kelvin Antony MD        Patient is already receiving anticoagulation with heparin, enoxaparin (LOVENOX), warfarin (COUMADIN)  or other anticoagulant medication   Does not apply Continuous PRN Kelvin Antony MD        prochlorperazine (COMPAZINE) injection 5 mg  5 mg Intravenous Q6H PRN Kelvin Antony MD        Or    prochlorperazine (COMPAZINE) tablet 5 mg  5 mg Oral Q6H PRN Kelvin Antony MD        Or    prochlorperazine (COMPAZINE) suppository 12.5 mg  12.5 mg Rectal Q12H PRN Kelvin Antony MD        Reason ACE/ARB/ARNI order not selected   Other DOES NOT GO TO Kimberli Durham MD        Reason beta blocker  order not selected   Does not apply DOES NOT GO TO Kimberli Durham MD        Reason statin medication order not selected   Does not apply DOES NOT GO TO Kimberli Durham MD        rosuvastatin (CRESTOR) tablet 20 mg  20 mg Oral At Bedtime Kelvin Antony MD   20 mg at 05/24/24 2111    senna-docusate (SENOKOT-S/PERICOLACE) 8.6-50 MG per tablet 1 tablet  1 tablet Oral BID PRN Kelvin Antony MD        Or    senna-docusate (SENOKOT-S/PERICOLACE) 8.6-50 MG per tablet 2 tablet  2 tablet Oral BID PRN Kelvin Antony MD        sodium chloride (PF) 0.9% PF flush 3 mL  3 mL Intracatheter Q8H Kelvin Antony MD        sodium chloride (PF) 0.9% PF flush 3 mL  3 mL Intracatheter q1 min prn Kelvin Antony MD         Current Facility-Administered Medications   Medication Dose Route Frequency Provider Last Rate Last Admin    acetaminophen (TYLENOL) tablet 650 mg  650 mg Oral Q4H PRN Kelvin Antony MD        Or    acetaminophen (TYLENOL) Suppository 650 mg  650 mg Rectal Q4H PRN Kelvin Antony MD        aspirin (ASA) chewable tablet 81 mg  81 mg Oral Daily Kelvin Antony MD   81 mg at 05/25/24 0838    calcium carbonate (TUMS) chewable tablet 1,000 mg  1,000 mg Oral 4x Daily PRN Kelvin Antony MD        glucose gel 15-30 g  15-30 g Oral Q15 Min PRN Kelvin Antony MD        Or    dextrose 50 % injection 25-50 mL  25-50 mL Intravenous Q15 Min PRN Kelvin Antony MD        Or    glucagon injection 1 mg  1 mg Subcutaneous Q15 Min PRN Kelvin Antony MD        heparin 25,000 units in 0.45% NaCl 250 mL ANTICOAGULANT infusion  0-5,000 Units/hr Intravenous Continuous Kelvin Antony MD 11.5 mL/hr at 05/25/24 0712 1,150 Units/hr at 05/25/24 0712    HOLD: nitroGLYcerin IF   Does not apply HOLD Kelvin Antony MD        insulin aspart (NovoLOG) injection (RAPID ACTING)  5 Units Subcutaneous TID w/meals Kelvin Antony MD   5 Units at 05/25/24 0803    insulin aspart (NovoLOG) injection (RAPID ACTING)  1-7 Units Subcutaneous TID AC Kelvin Antony MD    1 Units at 05/25/24 0802    insulin aspart (NovoLOG) injection (RAPID ACTING)  1-5 Units Subcutaneous At Bedtime Kelvin Antony MD   3 Units at 05/24/24 2114    insulin glargine (LANTUS PEN) injection 15 Units  15 Units Subcutaneous At Bedtime Kelvin Antony MD   15 Units at 05/24/24 2116    lidocaine (LMX4) cream   Topical Q1H PRN Kelvin Antony MD        lidocaine 1 % 0.1-1 mL  0.1-1 mL Other Q1H PRN Kelvin Antony MD        lisinopril (ZESTRIL) tablet 10 mg  10 mg Oral Daily Kelvin Antony MD   10 mg at 05/25/24 0838    medication instruction   Does not apply Continuous PRN Kelvin Antony MD        melatonin tablet 1 mg  1 mg Oral At Bedtime PRN Kelvin Antony MD        metoprolol tartrate (LOPRESSOR) tablet 25 mg  25 mg Oral BID Kelvin Antony MD   25 mg at 05/25/24 0838    morphine (PF) injection 1 mg  1 mg Intravenous Q2H PRN Kelvin Antony MD        naloxone (NARCAN) injection 0.2 mg  0.2 mg Intravenous Q2 Min PRN Kelvin Antony MD        Or    naloxone (NARCAN) injection 0.4 mg  0.4 mg Intravenous Q2 Min PRN Kelvin Antony MD        Or    naloxone (NARCAN) injection 0.2 mg  0.2 mg Intramuscular Q2 Min PRN Kelvin Antony MD        Or    naloxone (NARCAN) injection 0.4 mg  0.4 mg Intramuscular Q2 Min PRN Kelvin Antony MD        ondansetron (ZOFRAN ODT) ODT tab 4 mg  4 mg Oral Q6H PRN Kelvin Antony MD        Or    ondansetron (ZOFRAN) injection 4 mg  4 mg Intravenous Q6H PRN Kelvin Antony MD        Patient is already receiving anticoagulation with heparin, enoxaparin (LOVENOX), warfarin (COUMADIN)  or other anticoagulant medication   Does not apply Continuous PRN Kelvin Antony MD        prochlorperazine (COMPAZINE) injection 5 mg  5 mg Intravenous Q6H PRN Kelvin Antony MD        Or    prochlorperazine (COMPAZINE) tablet 5 mg  5 mg Oral Q6H PRN Kelvin Antony MD        Or    prochlorperazine (COMPAZINE) suppository 12.5 mg  12.5 mg Rectal Q12H PRN Kelvin Antony MD        Reason ACE/ARB/ARNI order not selected   Other DOES  "NOT GO TO Kimberli Durham MD        Reason beta blocker order not selected   Does not apply DOES NOT GO TO Kimberli Durham MD        Reason statin medication order not selected   Does not apply DOES NOT GO TO Kimberli Durham MD        rosuvastatin (CRESTOR) tablet 20 mg  20 mg Oral At Bedtime Kelvin Antony MD   20 mg at 05/24/24 2111    senna-docusate (SENOKOT-S/PERICOLACE) 8.6-50 MG per tablet 1 tablet  1 tablet Oral BID PRN Kelvin Antony MD        Or    senna-docusate (SENOKOT-S/PERICOLACE) 8.6-50 MG per tablet 2 tablet  2 tablet Oral BID PRN Kelvin Antony MD        sodium chloride (PF) 0.9% PF flush 3 mL  3 mL Intracatheter Q8H Kelvin Antony MD        sodium chloride (PF) 0.9% PF flush 3 mL  3 mL Intracatheter q1 min prn Kelvin Antony MD         Allergies   No Known Allergies    Social History        Family History          Review of Systems   A comprehensive review of system was performed and is negative other than that noted in the HPI or here.     Physical Exam   Vital Signs with Ranges  Temp:  [97.4  F (36.3  C)-98.1  F (36.7  C)] 98  F (36.7  C)  Pulse:  [65-95] 71  Resp:  [11-22] 16  BP: (132-167)/() 138/73  SpO2:  [96 %-99 %] 97 %  Wt Readings from Last 4 Encounters:   05/24/24 93.8 kg (206 lb 11.2 oz)     I/O last 3 completed shifts:  In: -   Out: 300 [Urine:300]      Vitals: /73 (BP Location: Left arm)   Pulse 71   Temp 98  F (36.7  C) (Oral)   Resp 16   Ht 1.854 m (6' 1\")   Wt 93.8 kg (206 lb 11.2 oz)   SpO2 97%   BMI 27.27 kg/m      Physical Exam:   General - Alert and in no acute distress  Respiratory -clear to auscultation bilaterally on room air  Cardiovascular -regular heart sounds no murmur rubs or gallops no edema  Gastrointestinal - Non distended  Psych - Appropriate affect     No lab results found in last 7 days.    Invalid input(s): \"TROPONINIES\"    Recent Labs   Lab 05/25/24  0745 05/25/24  0554 05/25/24  0205 05/24/24  1808 05/24/24  1654 " "  WBC  --  7.4  --   --  7.6   HGB  --  14.0  --   --  14.5   MCV  --  90  --   --  88   PLT  --  277  --   --  315   NA  --  140  --   --  134*   POTASSIUM  --  4.3  --   --  4.4   CHLORIDE  --  103  --   --  98   CO2  --  25  --   --  23   BUN  --  15.4  --   --  17.1   CR  --  0.99  --   --  0.95   GFRESTIMATED  --  80  --   --  85   ANIONGAP  --  12  --   --  13   BIRGIT  --  8.9  --   --  9.0   * 119* 110*   < > 390*    < > = values in this interval not displayed.     Recent Labs   Lab Test 05/24/24  1654   CHOL 167   HDL 45   LDL 98   TRIG 119     Recent Labs   Lab 05/25/24  0554 05/24/24  1654   WBC 7.4 7.6   HGB 14.0 14.5   HCT 40.9 41.3   MCV 90 88    315     No results for input(s): \"PH\", \"PHV\", \"PO2\", \"PO2V\", \"SAT\", \"PCO2\", \"PCO2V\", \"HCO3\", \"HCO3V\" in the last 168 hours.  No results for input(s): \"NTBNPI\", \"NTBNP\" in the last 168 hours.  No results for input(s): \"DD\" in the last 168 hours.  No results for input(s): \"SED\", \"CRP\" in the last 168 hours.  Recent Labs   Lab 05/25/24  0554 05/24/24  1654    315     No results for input(s): \"TSH\" in the last 168 hours.  No results for input(s): \"COLOR\", \"APPEARANCE\", \"URINEGLC\", \"URINEBILI\", \"URINEKETONE\", \"SG\", \"UBLD\", \"URINEPH\", \"PROTEIN\", \"UROBILINOGEN\", \"NITRITE\", \"LEUKEST\", \"RBCU\", \"WBCU\" in the last 168 hours.    Imaging:  Recent Results (from the past 48 hour(s))   Chest XR,  PA & LAT    Narrative    EXAM: XR CHEST 2 VIEWS  LOCATION: [facility]  DATE: 5/24/2024    INDICATION: chest pain  COMPARISON: None.      Impression    IMPRESSION:     Heart size is normal. Lungs are clear bilaterally. Mediastinum and visualized bony structures are unremarkable.   Echocardiogram Complete   Result Value    LVEF  50-55%    Narrative    [id number]  [id number]  [id number]^BIALA^[facility]  Echocardiography Laboratory  [address] [place] [address]     Name: ROXANNE STEPHENS  MRN: " [id number]  : 1950  Study Date: 2024 07:56 AM  Age: 74 yrs  Gender: Male  Patient Location: [facility]  Reason For Study: Chest Pain, Chest Tightness, Chest Pressure  Ordering Physician: JIMMY MARTINS  Referring Physician: Rowena Primary  Performed By: Km Gallo RDCS     BSA: 2.2 m2  Height: 73 in  Weight: 206 lb  HR: 69  BP: 154/110 mmHg  ______________________________________________________________________________  Procedure  Complete Portable Echo Adult. Optison (NDC #7716-7200) given intravenously.  ______________________________________________________________________________  Interpretation Summary     The visual ejection fraction is estimated at 50-55%.  There is moderate lateral and anterolateral hypokinesis concerning for  obstructive CAD in the LCX or Diagonal territories.  The right ventricle is normal in size and function.  There is no pericardial effusion.  The inferior vena cava was normal in size with preserved respiratory  variability.  ______________________________________________________________________________  Left Ventricle  The left ventricle is normal in size. There is mild concentric left  ventricular hypertrophy. The visual ejection fraction is estimated at 50-55%.  There is moderate lateral and anterolateral hypokinesis concerning for  obstructive CAD in the LCX or Diagonal territories.     Right Ventricle  The right ventricle is normal in size and function.     Atria  Normal left atrial size. Right atrial size is normal.     Mitral Valve  The mitral valve is normal in structure and function. There is trace mitral  regurgitation. There is no mitral valve stenosis. The mean mitral valve  gradient is 1.1 mmHg. The peak mitral valve gradient is 2.3 mmHg.     Tricuspid Valve  The tricuspid valve is not well visualized, but is grossly normal. Right  ventricular systolic pressure could not be approximated due to inadequate  tricuspid regurgitation.     Aortic Valve  The aortic valve is  normal in structure and function. No aortic regurgitation  is present. No aortic stenosis is present.     Pulmonic Valve  The pulmonic valve is not well visualized.     Vessels  The aortic Sinus(es) of Valsalva are mildly dilated. Normal size ascending  aorta. The inferior vena cava was normal in size with preserved respiratory  variability.     Pericardium  There is no pericardial effusion.     ______________________________________________________________________________  MMode/2D Measurements & Calculations  IVSd: 1.2 cm  LVIDd: 4.7 cm  LVIDs: 2.9 cm  LVPWd: 1.1 cm  IVC diam: 1.3 cm  FS: 37.7 %  LV mass(C)d: 194.0 grams  LV mass(C)dI: 89.0 grams/m2     Ao root diam: 3.9 cm  asc Aorta Diam: 3.6 cm  LVOT diam: 2.4 cm  LVOT area: 4.3 cm2  Ao root diam index Ht(cm/m): 2.1  Ao root diam index BSA (cm/m2): 1.8  Asc Ao diam index BSA (cm/m2): 1.6  Asc Ao diam index Ht(cm/m): 1.9  EF Biplane: 61.6 %  LA Volume (BP): 37.7 ml  LA Volume Index (BP): 17.3 ml/m2     RWT: 0.45  TAPSE: 2.2 cm     Time Measurements  Aortic HR: 67.0 BPM     Doppler Measurements & Calculations  MV E max rodrigue: 56.6 cm/sec  MV A max rodrigue: 83.8 cm/sec  MV E/A: 0.68  MV max P.3 mmHg  MV mean P.1 mmHg  MV V2 VTI: 21.7 cm  MVA(VTI): 3.7 cm2  MV dec time: 0.29 sec  LV V1 max PG: 3.2 mmHg  LV V1 max: 89.1 cm/sec  LV V1 VTI: 18.6 cm  CO(LVOT): 5.4 l/min  CI(LVOT): 2.5 l/min/m2  SV(LVOT): 80.8 ml  SI(LVOT): 37.1 ml/m2  PA acc time: 0.15 sec  PI end-d rodrigue: 98.8 cm/sec  E/E' av.3  Lateral E/e': 7.4     Medial E/e': 9.1  RV S Rodrigue: 12.1 cm/sec     ______________________________________________________________________________  Report approved by: Verónica Larsen 2024 09:22 AM             Echo:  No results found for this or any previous visit (from the past 4320 hour(s)).      This note was completed in part using dictation via the Dragon voice recognition software. Some word and grammatical errors may occur and must be interpreted in the  "appropriate clinical context.  If there are any questions pertaining to this issue, please contact me for further clarification.    Clinically Significant Risk Factors Present on Admission                      # DMII: A1C = 9.0 % (Ref range: <5.7 %) within past 6 months    # Overweight: Estimated body mass index is 27.27 kg/m  as calculated from the following:    Height as of this encounter: 1.854 m (6' 1\").    Weight as of this encounter: 93.8 kg (206 lb 11.2 oz).             Cardiomyopathy          "

## 2024-05-25 NOTE — Clinical Note
The first balloon was inserted into the circumflex.Max pressure = 6 graciela. Total duration = 15 seconds. No

## 2024-05-25 NOTE — PLAN OF CARE
Shift summary (0700-time of transfer to [facility])    Pt A/O x 4. VSS on RA. Denies pain. Tele SR w/ 1* AVB, denies CP. RPIV intact, infusing w/ heparin gtt @ 11.5 ml/hr, next hepXa 5/26 AM. Pt ate breakfast but has been NPO since then for potential angiogram today vs 5/26. Had ECHO this AM. Transfer to [facility] via HE stretcher, off unit @ 1111.    Goal Outcome Evaluation: transfer to [facility]    Problem: Adult Inpatient Plan of Care  Goal: Absence of Hospital-Acquired Illness or Injury  Intervention: Identify and Manage Fall Risk  Recent Flowsheet Documentation  Taken 5/25/2024 0845 by Lynette Velasquez RN  Safety Promotion/Fall Prevention:   activity supervised   assistive device/personal items within reach   clutter free environment maintained   increased rounding and observation   increase visualization of patient   lighting adjusted   mobility aid in reach   nonskid shoes/slippers when out of bed   patient and family education   room near nurse's station   room organization consistent   safety round/check completed   supervised activity   treat reversible contributory factors   treat underlying cause  Taken 5/25/2024 0804 by Lynette Velasquez RN  Safety Promotion/Fall Prevention: (ECHO being performed at bedside) --  Intervention: Prevent Skin Injury  Recent Flowsheet Documentation  Taken 5/25/2024 0845 by Lynette Velasquez RN  Body Position:   position changed independently   supine, head elevated  Skin Protection: adhesive use limited  Device Skin Pressure Protection:   absorbent pad utilized/changed   adhesive use limited   tubing/devices free from skin contact  Intervention: Prevent and Manage VTE (Venous Thromboembolism) Risk  Recent Flowsheet Documentation  Taken 5/25/2024 0845 by Lynette Velasquez RN  VTE Prevention/Management: (hep gtt) other (see comments)  Intervention: Prevent Infection  Recent Flowsheet Documentation  Taken 5/25/2024 0804 by Lynette Velasquez RN  Infection Prevention:    equipment surfaces disinfected   hand hygiene promoted   personal protective equipment utilized   rest/sleep promoted   single patient room provided

## 2024-05-25 NOTE — Clinical Note
The first balloon was inserted into the left anterior descending.Max pressure = 14 graciela. Total duration = 15 seconds.     Max pressure = 14 graciela. Total duration = 20 seconds.    Balloon reinflated a second time: Max pressure = 14 graciela. Total duration = 20 seconds.

## 2024-05-25 NOTE — Clinical Note
Stent deployed in the left anterior descending. Max pressure = 12 graciela. Total duration = 20 seconds.

## 2024-05-25 NOTE — PHARMACY-ADMISSION MEDICATION HISTORY
Pharmacy Intern Admission Medication History    Admission medication history is complete. The information provided in this note is only as accurate as the sources available at the time of the update.    Information Source(s): Patient and CareEverywhere/SureScripts via in-person    Pertinent Information:   -Pt was transferred from [facility] to [facility] this morning.  -Aspirin 81 mg tablet was initiated today at [facility], Pt took 1 tablet in the morning.    Changes made to PTA medication list:  Added:   Aspirin 81 mg tablet  Deleted: None  Changed: None    Allergies reviewed with patient and updates made in EHR: yes    Medication History Completed By: Km Woods 5/25/2024 1:07 PM    PTA Med List   Medication Sig Last Dose    amLODIPine (NORVASC) 5 MG tablet Take 1 tablet by mouth daily 5/24/2024 at am    aspirin (ASA) 81 MG chewable tablet Take 1 tablet (81 mg) by mouth daily 5/25/2024 at am    glipiZIDE (GLUCOTROL XL) 5 MG 24 hr tablet Take 10 mg by mouth daily (with breakfast) 5/24/2024 at am    metFORMIN (GLUCOPHAGE XR) 500 MG 24 hr tablet Take 1,000 mg by mouth 2 times daily (with meals) 5/24/2024 at am

## 2024-05-25 NOTE — H&P
[facility]  History and Physical - Hospitalist Service       Date of Admission:  5/25/2024  PRIMARY CARE PROVIDER:    Physicians, [facility]    Assessment & Plan   Kaden Cain is a 74 year old male who was directly admitted on 5/25/2024 due to NSTEMI with new wall motion changes on ECHO.      Past medical history significant for HTN, DM2.    Reviewed [facility] Discharge note as well as Cardiology Consult note completed at [facility].      Patient was admitted at [facility] from 5/24 to 5/25 due to an NSTEMI with chest pressure with associated nausea and diaphoresis.    While at [facility] patient was noted to have an EKG with nonspecific ST changes otherwise in sinus rhythm.  Initial troponin T was elevated at 265.  CBC and BMP were basically unremarkable except for glucose of 390.  Patient was treated with full dose aspirin and started on heparin infusion.  Subsequent troponin has been increasing (592--> 771-->952).  Echocardiogram with an estimated EF of 50 to 55%, moderate lateral and anterior lateral hypokinesis (concerning for obstructive CAD in the left circumflex or diagonal territories), RV with normal size and function and no evidence of pericardial effusion.  Repeat EKG showed sinus rhythm with first-degree AV block with PACs, left anterior fascicular block.  Lipid panel with an LDL of 98.  Cardiology was consulted and recommended transfer to [facility] to undergo coronary angiogram for which interventional cardiology has been notified.     NSTEMI  Cardiac wall motion changes  HLP  - Cardiology consult requested.    - Heparin infusion.    - Continue new Crestor 20 mg at bedtime (will need to be prescribed at discharge)  - Resumed on PTA ASA 81 mg/d.      HTN  - Hold PTA amlodipine 5 mg/d  - Continue new lisinopril 10 mg/d and metoprolol tartrate 25 mg BID (will need to be prescribed at discharge).  Hold parameters in place.      Type 2 DM, suspected uncontrolled  Recent Labs  "  Lab Test 05/24/24  1654   A1C 9.0*      PTA regimen includes:glipizide 10 mg/d and metformin 1000 mg BID.     - Hold PTA glipizide and metformin.  Resume at discharge.    - Continue Lantus (started at [facility]) at 15 units at bedtime (reduce if patient needs to be NPO at midnight).    - Hold off on restarting prandial insulin 5 units with each meal while currently NPO.    - Medium insulin sliding scale ordered.  - Glucose checks QID.    - Hypoglycemic protocol in place.      Clinically Significant Risk Factors Present on Admission                # Drug Induced Platelet Defect: home medication list includes an antiplatelet medication       # DMII: A1C = 9.0 % (Ref range: <5.7 %) within past 6 months    # Overweight: Estimated body mass index is 26.61 kg/m  as calculated from the following:    Height as of this encounter: 1.854 m (6' 1\").    Weight as of this encounter: 91.5 kg (201 lb 11.2 oz).              Diet: NPO per Anesthesia Guidelines for Procedure/Surgery Except for: Meds    DVT Prophylaxis: Heparin infusion  Stephens Catheter: Not present  Lines: None     Cardiac Monitoring: ACTIVE order. Indication: AMI (NSTEMI/ STEMI) (48 hours)  Code Status:  FULL CODE         Disposition Plan   Inpatient status.  Anticipate greater than 2 evening hospitalization while undergoing continued work-up/management of NSTEMI.      Medically Ready for Discharge: Anticipated in 2-4 Days    The patient's care was discussed with the Bedside Nurse, Patient, and Patient's Family.    The patient has been discussed with Dr. Holly, who agrees with the assessment and plan at this time.    Ok Desir PA-C  [facility]  Securely message with the Vocera Web Console (learn more here)    ______________________________________________________________________    Chief Complaint   Direct admit due to NSTEMI    History is obtained from the patient and EMR.      History of Present Illness   Kaden Cain is a " 74 year old male who was directly admitted on 5/25/2024 due to NSTEMI with new wall motion changes on ECHO.      Past medical history significant for HTN, DM2.    Reviewed [facility] Discharge note as well as Cardiology Consult note completed at [facility].      Patient was admitted at [facility] from 5/24 to 5/25 due to an NSTEMI with chest pressure with associated nausea and diaphoresis.    While at [facility] patient was noted to have an EKG with nonspecific ST changes otherwise in sinus rhythm.  Initial troponin T was elevated at 265.  CBC and BMP were basically unremarkable except for glucose of 390.  Patient was treated with full dose aspirin and started on heparin infusion.  Subsequent troponin has been increasing (592--> 771-->952).  Echocardiogram with an estimated EF of 50 to 55%, moderate lateral and anterior lateral hypokinesis (concerning for obstructive CAD in the left circumflex or diagonal territories), RV with normal size and function and no evidence of pericardial effusion.  Repeat EKG showed sinus rhythm with first-degree AV block with PACs, left anterior fascicular block.  Lipid panel with an LDL of 98.  Cardiology was consulted and recommended transfer to [facility] to undergo coronary angiogram for which interventional cardiology has been notified.     Patient was seen in his hospital room with his family present at bedside.  Initially, reviewed medical history.  Reviewed and discussed events that led to patient's presentation and admit to [facility] and subsequent transfer to [facility].    Patient indicated that he had chest pain yesterday 5/24 which resolved on its own and has not returned.  During this episode he indicated that he became diaphoretic.  He indicated that he has had some worsening fatigue and weakness over the last 1 to 2 weeks.  Otherwise offered no other complaints or concerns.    Reviewed workup completed at [facility] with patient and patient's family  and overall plan regarding admit/transfer to [facility].    Patient resides in a house with his wife in [place], [place].  He denies any history of current use of tobacco use.  He has abstained from alcohol use for about 48 years.  He does not utilize recreational drugs.  He does not utilize a cane or walker.  He does not utilize supplemental oxygen or CPAP machine.    Discussed and reviewed CODE STATUS and patient elected to be full code.      Past Medical History    I have reviewed this patient's medical history and updated it with pertinent information if needed.   HTN, DM2.    Prior to Admission Medications   Prior to Admission Medications   Prescriptions Last Dose Informant Patient Reported? Taking?   amLODIPine (NORVASC) 5 MG tablet   Yes No   Sig: Take 1 tablet by mouth daily   aspirin (ASA) 81 MG chewable tablet   No No   Sig: Take 1 tablet (81 mg) by mouth daily   glipiZIDE (GLUCOTROL XL) 5 MG 24 hr tablet   Yes No   Sig: Take 10 mg by mouth daily (with breakfast)   metFORMIN (GLUCOPHAGE XR) 500 MG 24 hr tablet   Yes No   Sig: Take 1,000 mg by mouth 2 times daily (with meals)      Facility-Administered Medications: None     Allergies   No Known Allergies    Physical Exam   Vital Signs: Temp: 98.1  F (36.7  C) Temp src: Oral BP: 122/82 Pulse: 64            Weight: 201 lbs 11.2 oz    Constitutional: Awake, alert, cooperative, no apparent distress.    ENT: Normocephalic, without obvious abnormality, atraumatic, oral pharynx with moist mucus membranes, tonsils without erythema or exudates.  Eyes pupils are equal, round and reactive to light; extra occular movements intact.  Normal sclera.    Neck: Supple, symmetrical, trachea midline, no adenopathy.  Pulmonary: No increased work of breathing, good air exchange, clear to auscultation bilaterally, no crackles or wheezing.  Cardiovascular: Regular rate and rhythm, normal S1 and S2, no S3 or S4, and no murmur noted.  GI: Normal bowel sounds, soft,  non-distended, non-tender.    Skin/Integumen: Visualized skin appeared clear.  Neuro: CN II-XII grossly intact.  Upper and lower extremities strength, coordination and sensation intact bilaterally.    Psych:  Alert and oriented x 3. Normal affect.  Extremities: No lower extremity edema noted, and calves are non-tender to palpation bilaterally.     Medical Decision Making       Please see A&P for additional details of medical decision making.  GREATER THAN 75 MINUTES SPENT BY ME on the date of service doing chart review, history, exam, documentation & further activities per the note.         Data   Data reviewed today: I reviewed all medications, new labs and imaging results over the last 24 hours. I personally reviewed no images or EKG's today.      I have personally reviewed the following data over the past 24 hrs:    9.1  \   13.9   / 287     140 103 15.4 /  131 (H)   4.3 25 0.99 \     Trop: 842 (HH) BNP: N/A     TSH: N/A T4: N/A A1C: 9.0 (H)       Imaging results reviewed over the past 24 hrs:   Recent Results (from the past 24 hour(s))   Chest XR,  PA & LAT    Narrative    EXAM: XR CHEST 2 VIEWS  LOCATION: [facility]  DATE: 2024    INDICATION: chest pain  COMPARISON: None.      Impression    IMPRESSION:     Heart size is normal. Lungs are clear bilaterally. Mediastinum and visualized bony structures are unremarkable.   Echocardiogram Complete   Result Value    LVEF  50-55%    Narrative    [id number]  [id number]  [id number]  [id number]^JIMMY     [facility]  Echocardiography Laboratory  [address] [place] [address]     Name: ROXANNE STEPHENS  MRN: [id number]  : 1950  Study Date: 2024 07:56 AM  Age: 74 yrs  Gender: Male  Patient Location: [facility]  Reason For Study: Chest Pain, Chest Tightness, Chest Pressure  Ordering Physician: JIMMY MARTINS  Referring Physician: No Primary  Performed By: Km Gallo RDCS     BSA: 2.2 m2  Height: 73 in  Weight: 206  lb  HR: 69  BP: 154/110 mmHg  ______________________________________________________________________________  Procedure  Complete Portable Echo Adult. Optison (NDC #2623-4160) given intravenously.  ______________________________________________________________________________  Interpretation Summary     The visual ejection fraction is estimated at 50-55%.  There is moderate lateral and anterolateral hypokinesis concerning for  obstructive CAD in the LCX or Diagonal territories.  The right ventricle is normal in size and function.  There is no pericardial effusion.  The inferior vena cava was normal in size with preserved respiratory  variability.  ______________________________________________________________________________  Left Ventricle  The left ventricle is normal in size. There is mild concentric left  ventricular hypertrophy. The visual ejection fraction is estimated at 50-55%.  There is moderate lateral and anterolateral hypokinesis concerning for  obstructive CAD in the LCX or Diagonal territories.     Right Ventricle  The right ventricle is normal in size and function.     Atria  Normal left atrial size. Right atrial size is normal.     Mitral Valve  The mitral valve is normal in structure and function. There is trace mitral  regurgitation. There is no mitral valve stenosis. The mean mitral valve  gradient is 1.1 mmHg. The peak mitral valve gradient is 2.3 mmHg.     Tricuspid Valve  The tricuspid valve is not well visualized, but is grossly normal. Right  ventricular systolic pressure could not be approximated due to inadequate  tricuspid regurgitation.     Aortic Valve  The aortic valve is normal in structure and function. No aortic regurgitation  is present. No aortic stenosis is present.     Pulmonic Valve  The pulmonic valve is not well visualized.     Vessels  The aortic Sinus(es) of Valsalva are mildly dilated. Normal size ascending  aorta. The inferior vena cava was normal in size with preserved  respiratory  variability.     Pericardium  There is no pericardial effusion.     ______________________________________________________________________________  MMode/2D Measurements & Calculations  IVSd: 1.2 cm  LVIDd: 4.7 cm  LVIDs: 2.9 cm  LVPWd: 1.1 cm  IVC diam: 1.3 cm  FS: 37.7 %  LV mass(C)d: 194.0 grams  LV mass(C)dI: 89.0 grams/m2     Ao root diam: 3.9 cm  asc Aorta Diam: 3.6 cm  LVOT diam: 2.4 cm  LVOT area: 4.3 cm2  Ao root diam index Ht(cm/m): 2.1  Ao root diam index BSA (cm/m2): 1.8  Asc Ao diam index BSA (cm/m2): 1.6  Asc Ao diam index Ht(cm/m): 1.9  EF Biplane: 61.6 %  LA Volume (BP): 37.7 ml  LA Volume Index (BP): 17.3 ml/m2     RWT: 0.45  TAPSE: 2.2 cm     Time Measurements  Aortic HR: 67.0 BPM     Doppler Measurements & Calculations  MV E max rodrigue: 56.6 cm/sec  MV A max rodrigue: 83.8 cm/sec  MV E/A: 0.68  MV max P.3 mmHg  MV mean P.1 mmHg  MV V2 VTI: 21.7 cm  MVA(VTI): 3.7 cm2  MV dec time: 0.29 sec  LV V1 max PG: 3.2 mmHg  LV V1 max: 89.1 cm/sec  LV V1 VTI: 18.6 cm  CO(LVOT): 5.4 l/min  CI(LVOT): 2.5 l/min/m2  SV(LVOT): 80.8 ml  SI(LVOT): 37.1 ml/m2  PA acc time: 0.15 sec  PI end-d rodrigue: 98.8 cm/sec  E/E' av.3  Lateral E/e': 7.4     Medial E/e': 9.1  RV S Rodrigue: 12.1 cm/sec     ______________________________________________________________________________  Report approved by: Verónica Larsen 2024 09:22 AM

## 2024-05-25 NOTE — PLAN OF CARE
Goal Outcome Evaluation:      Plan of Care Reviewed With: patient, spouse    Overall Patient Progress: improvingOverall Patient Progress: improving    Outcome Evaluation: Pt on Hep gtt, no CP Last trop 598.    Pt VSS, afeb., BP much improved at 139/85. LS are clear, 98% on RA. Denies CP,  Last BG at HS was 304. Plan to have Echo in AM, and Cardiology consult. Pt A & O x4.  Hep gtt @ 1150, recheck at 2400.

## 2024-05-25 NOTE — PROGRESS NOTES
Report given via phone by this RN to receiving [facility]

## 2024-05-26 ENCOUNTER — APPOINTMENT (OUTPATIENT)
Dept: PHYSICAL THERAPY | Facility: CLINIC | Age: 74
DRG: 321 | End: 2024-05-26
Attending: HOSPITALIST
Payer: MEDICARE

## 2024-05-26 VITALS
TEMPERATURE: 98.1 F | OXYGEN SATURATION: 96 % | BODY MASS INDEX: 26.52 KG/M2 | RESPIRATION RATE: 18 BRPM | WEIGHT: 200.1 LBS | SYSTOLIC BLOOD PRESSURE: 162 MMHG | HEART RATE: 84 BPM | DIASTOLIC BLOOD PRESSURE: 89 MMHG | HEIGHT: 73 IN

## 2024-05-26 LAB
ANION GAP SERPL CALCULATED.3IONS-SCNC: 12 MMOL/L (ref 7–15)
BUN SERPL-MCNC: 13.4 MG/DL (ref 8–23)
CALCIUM SERPL-MCNC: 8.7 MG/DL (ref 8.8–10.2)
CHLORIDE SERPL-SCNC: 100 MMOL/L (ref 98–107)
CREAT SERPL-MCNC: 0.94 MG/DL (ref 0.67–1.17)
DEPRECATED HCO3 PLAS-SCNC: 23 MMOL/L (ref 22–29)
EGFRCR SERPLBLD CKD-EPI 2021: 86 ML/MIN/1.73M2
ERYTHROCYTE [DISTWIDTH] IN BLOOD BY AUTOMATED COUNT: 11.9 % (ref 10–15)
GLUCOSE BLDC GLUCOMTR-MCNC: 169 MG/DL (ref 70–99)
GLUCOSE BLDC GLUCOMTR-MCNC: 179 MG/DL (ref 70–99)
GLUCOSE SERPL-MCNC: 180 MG/DL (ref 70–99)
HCT VFR BLD AUTO: 43 % (ref 40–53)
HGB BLD-MCNC: 14.8 G/DL (ref 13.3–17.7)
MCH RBC QN AUTO: 30.4 PG (ref 26.5–33)
MCHC RBC AUTO-ENTMCNC: 34.4 G/DL (ref 31.5–36.5)
MCV RBC AUTO: 88 FL (ref 78–100)
PLATELET # BLD AUTO: 306 10E3/UL (ref 150–450)
POTASSIUM SERPL-SCNC: 4.2 MMOL/L (ref 3.4–5.3)
RBC # BLD AUTO: 4.87 10E6/UL (ref 4.4–5.9)
SODIUM SERPL-SCNC: 135 MMOL/L (ref 135–145)
WBC # BLD AUTO: 11.9 10E3/UL (ref 4–11)

## 2024-05-26 PROCEDURE — 250N000013 HC RX MED GY IP 250 OP 250 PS 637: Performed by: PHYSICIAN ASSISTANT

## 2024-05-26 PROCEDURE — 99239 HOSP IP/OBS DSCHRG MGMT >30: CPT | Performed by: INTERNAL MEDICINE

## 2024-05-26 PROCEDURE — 93010 ELECTROCARDIOGRAM REPORT: CPT | Mod: XP | Performed by: INTERNAL MEDICINE

## 2024-05-26 PROCEDURE — 99233 SBSQ HOSP IP/OBS HIGH 50: CPT | Performed by: INTERNAL MEDICINE

## 2024-05-26 PROCEDURE — 80048 BASIC METABOLIC PNL TOTAL CA: CPT | Performed by: PHYSICIAN ASSISTANT

## 2024-05-26 PROCEDURE — 85041 AUTOMATED RBC COUNT: CPT | Performed by: PHYSICIAN ASSISTANT

## 2024-05-26 PROCEDURE — 250N000013 HC RX MED GY IP 250 OP 250 PS 637: Performed by: HOSPITALIST

## 2024-05-26 PROCEDURE — 36415 COLL VENOUS BLD VENIPUNCTURE: CPT | Performed by: PHYSICIAN ASSISTANT

## 2024-05-26 PROCEDURE — 97530 THERAPEUTIC ACTIVITIES: CPT | Mod: GP

## 2024-05-26 PROCEDURE — 250N000013 HC RX MED GY IP 250 OP 250 PS 637: Performed by: INTERNAL MEDICINE

## 2024-05-26 PROCEDURE — 97161 PT EVAL LOW COMPLEX 20 MIN: CPT | Mod: GP

## 2024-05-26 PROCEDURE — 97110 THERAPEUTIC EXERCISES: CPT | Mod: GP

## 2024-05-26 PROCEDURE — 93005 ELECTROCARDIOGRAM TRACING: CPT

## 2024-05-26 RX ORDER — NITROGLYCERIN 0.4 MG/1
TABLET SUBLINGUAL
Qty: 15 TABLET | Refills: 0 | Status: SHIPPED | OUTPATIENT
Start: 2024-05-26

## 2024-05-26 RX ORDER — LISINOPRIL 20 MG/1
20 TABLET ORAL DAILY
Qty: 30 TABLET | Refills: 0 | Status: SHIPPED | OUTPATIENT
Start: 2024-05-27

## 2024-05-26 RX ORDER — LISINOPRIL 20 MG/1
20 TABLET ORAL DAILY
Status: DISCONTINUED | OUTPATIENT
Start: 2024-05-26 | End: 2024-05-26 | Stop reason: HOSPADM

## 2024-05-26 RX ORDER — METOPROLOL TARTRATE 25 MG/1
25 TABLET, FILM COATED ORAL 2 TIMES DAILY
Qty: 60 TABLET | Refills: 0 | Status: SHIPPED | OUTPATIENT
Start: 2024-05-26

## 2024-05-26 RX ORDER — ROSUVASTATIN CALCIUM 20 MG/1
20 TABLET, COATED ORAL AT BEDTIME
Qty: 30 TABLET | Refills: 0 | Status: SHIPPED | OUTPATIENT
Start: 2024-05-26

## 2024-05-26 RX ADMIN — METOPROLOL TARTRATE 25 MG: 25 TABLET, FILM COATED ORAL at 08:04

## 2024-05-26 RX ADMIN — TICAGRELOR 90 MG: 90 TABLET ORAL at 11:20

## 2024-05-26 RX ADMIN — ASPIRIN 81 MG CHEWABLE TABLET 81 MG: 81 TABLET CHEWABLE at 08:04

## 2024-05-26 RX ADMIN — INSULIN ASPART 1 UNITS: 100 INJECTION, SOLUTION INTRAVENOUS; SUBCUTANEOUS at 08:05

## 2024-05-26 RX ADMIN — TICAGRELOR 90 MG: 90 TABLET ORAL at 00:09

## 2024-05-26 RX ADMIN — LISINOPRIL 20 MG: 20 TABLET ORAL at 09:21

## 2024-05-26 ASSESSMENT — ACTIVITIES OF DAILY LIVING (ADL)
ADLS_ACUITY_SCORE: 27

## 2024-05-26 NOTE — PLAN OF CARE
Goal Outcome Evaluation:      Plan of Care Reviewed With: patient, spouse, child        Up independent in room. A&O x 4. VSS, O2 stable on RA. Denies chest pain. Up with cardiac rehab this morning. Tolerating current diet, fluids encouraged. R radial site WDL, CMS intact. BP elevated, meds adjusted per MD. Plan for discharge this afternoon.     Pt discharged home in stable condition via daughter and wife for transportation. AVS was reviewed, patient, wife and daughter verbalized understanding of discharge instructions and need for follow up appointments. All questions answered. All personal belongings sent home with patient.

## 2024-05-26 NOTE — PROGRESS NOTES
From 1900 to 2300. A&O x 4, pleasant. VSS on RA, except BP slightly elevated, denied pain/SOB. Right radial site CDI, CMS intact. Tele SR with occasional PVC. Continue plan of care.

## 2024-05-26 NOTE — DISCHARGE SUMMARY
"[facility]  Hospitalist Discharge Summary      Date of Admission:  5/25/2024  Date of Discharge:  5/26/2024  Discharging Provider: Minnie Chiang MD  Discharge Service: Hospitalist Service    Discharge Diagnoses   Non-ST elevation MI status post PCI with JOSE to proximal to mid LAD, distal LAD, proximal circumflex and mid circumflex  Coronary artery disease  Type 2 diabetes  Hypertension    Clinically Significant Risk Factors     # DMII: A1C = 9.0 % (Ref range: <5.7 %) within past 6 months  # Overweight: Estimated body mass index is 26.4 kg/m  as calculated from the following:    Height as of this encounter: 1.854 m (6' 1\").    Weight as of this encounter: 90.8 kg (200 lb 1.6 oz).       Follow-ups Needed After Discharge   Follow-up Appointments     Follow-up and recommended labs and tests       Follow up with primary care provider, [facility], within 7   days for hospital follow- up.    Follow-up with cardiology in 1 month as advised  Follow-up with cardiac rehab next available          Unresulted Labs Ordered in the Past 30 Days of this Admission       No orders found for last 31 day(s).          Discharge Disposition   Discharged to home  Condition at discharge: Stable    Hospital Course   Kaden Cain is a 74 year old male with history of hypertension and type 2 diabetes who was directly admitted on 5/25/2024 from [facility] due to NSTEMI with new wall motion changes on ECHO.       NSTEMI-status post PCI with JOSE to proximal to mid LAD, distal LAD, proximal circumflex and mid circumflex  Hyperlipidemia  Patient was admitted at [facility] from 5/24 to 5/25 due to an NSTEMI with chest pressure with associated nausea and diaphoresis.  - Initial troponin T was elevated at 265.    - Patient was treated with full dose aspirin and started on heparin infusion.   - Subsequent troponin has been increasing (592--> 771-->952).   - Echocardiogram with an estimated EF of " 50 to 55%, moderate lateral and anterior lateral hypokinesis (concerning for obstructive CAD in the left circumflex or diagonal territories), RV with normal size and function and no evidence of pericardial effusion.    - Lipid panel with an LDL of 98.   - Cardiology was consulted and recommended transfer to [facility] to undergo urgent coronary angiogram  -Patient underwent coronary angiogram 5/25 and was noted to have 70% stenosed RPDA, 85% stenosed proximal to mid LAD, 85% stenosed distal LAD, 70% stenosed proximal circumflex and 65% stenosed mid circumflex. Underwent successful PCI with placement of JOSE x 4, please see angiogram report for details   -Patient started on metoprolol, lisinopril with up titration being done while in the hospital.  Further titration can be done by PCP/cardiology for any medication adjustment.  Patient was advised to monitor his blood pressure regularly and readings to PCP for any medication adjustment  - Resumed on PTA ASA 81 mg/d.  Patient also started on Brilinta 90 mg twice daily and Crestor 20 mg daily.     HTN  -Patient started on metoprolol and lisinopril as above  -PTA amlodipine was held at discharge given cardiology recommendation about uptitrating Lisinopril over restarting his PTA amlodipine     Type 2 DM, suspected uncontrolled  Most recent hemoglobin A1c 9  PTA regimen includes:glipizide 10 mg/d and metformin 1000 mg BID which was held during hospitalization and treated with insulin but resumed at discharge  Patient was also advised to hold metformin for 72 hours post IV contrast  -Recommended that patient follow his blood sugar closely and readings to PCP for medication changes.       Consultations This Hospital Stay   CARDIOLOGY IP CONSULT  PHARMACY IP CONSULT  ADVANCE DIRECTIVE IP CONSULT  NUTRITION SERVICES ADULT IP CONSULT  CARDIAC REHAB IP CONSULT  PHARMACY IP CONSULT  SMOKING CESSATION PROGRAM IP CONSULT    Code Status   Full Code    Time Spent on this  Encounter   I, Minnie Chiang MD, personally saw the patient today and spent greater than 30 minutes discharging this patient.       Minnie Chiang MD  [facility]  [address]  [place] [address]  Phone: [phone]  ______________________________________________________________________    Physical Exam   Vital Signs: Temp: 98.1  F (36.7  C) Temp src: Oral BP: (!) 162/89 Pulse: 84   Resp: 18 SpO2: 96 % O2 Device: None (Room air)    Weight: 200 lbs 1.6 oz  Exam:  Constitutional: Awake, alert and no distress. Appears comfortable  Head: Normocephalic. No masses, lesions, tenderness or abnormalities  ENMT: ENT exam normal, no neck nodes or sinus tenderness  Cardiovascular: RRR.  No normal murmurs, no rubs or JVD  Respiratory: Normal WOB,b/l equal air entry, no wheezes or crackles   Gastrointestinal: Abdomen soft, non-tender. BS normal. No masses, organomegaly  : Deferred  Extremities : No edema , no clubbing or cyanosis    Neurologic: Cranial nerves II-XII grossly intact , power symmetric, Reflexes normal and symmetric. Sensation grossly WNL.        Primary Care Physician   [facility]    Discharge Orders      Basic metabolic panel     CBC with platelets     Cardiac Rehab  Referral      Follow-Up with Cardiology [facility]      EKG 12-lead complete w/read  (to be scheduled)     Medication Instructions - Anticoagulants    Do NOT stop your aspirin or platelet inhibitor unless directed by your Cardiologist.  These medications help to prevent platelets in your blood from sticking together and forming a clot.  Examples of these medications are:  Ticagrelor (Brilinta), Clopidigrel (Plavix), Prasugrel (Effient)     When to call - Contact the Heart Clinic    You may experience symptoms that require follow-up before your scheduled appointment. Contact the Heart Clinic if you develop: Fever over 100.4o Fahrenheit, that lasts more than one day; Redness, heat, or  pus at the puncture site; Change in color or temperature in your hand or arm.     When to call - Reasons to Call 911    If your wrist puncture site starts bleeding after discharge, sit down and apply firm pressure with your thumb against the puncture site and fingers against the back of the wrist for 10 minutes. If the bleeding stops, continue to rest, keeping your wrist still for 2 hours. Notify your doctor as soon as possible.  IF BLEEDING DOES NOT STOP OR THERE IS A LARGER AMOUNT OF BLEEDING OR SPURTING CALL 9-1-1 immediately.DO NOT drive yourself to the hospital.     Precautions - Lifting    DO NOT lift more than 5 pounds with affected arm for 48 hours     Precautions - Household Activities    Avoid excessive bending or movement of your wrist for 72 hours.  Do not subject hand/arm to any forceful movements for 24 hours, such as supporting weight when rising from a chair or bed.     Remove the band-aid on the puncture site after 24 hours and leave open to air. If minor oozing, you may apply a band-aid and remove after 12 hours.     Precautions - Active Sports Activities    DO NOT engage in vigorous exercise using your affected arm for 3 days after discharge.  This includes golf, tennis or swimming.     Precautions - Operating yard equipment or vehicles    Do not operate a chainsaw, lawnmower, motorcycle, or all-terrain vehicle for 48 hours after the procedure.     Precautions - Elective Dental Work    NO elective dental work for 6 weeks after receiving a stent.     Comfort and Pain Management - Bruising after Surgery    Expect mild tingling of hand and tenderness at the wrist puncture site for up to 3 days. You may take Tylenol or a pain medicine recommended by your doctor.     Activity - Cardiac Rehab    You are encouraged to enroll in an Outpatient Cardiac Rehab program after discharge from the hospital.  Our Cardiac Rehab staff may visit briefly with you while you're in the hospital.  If they miss you, someone  will contact you after you are home.     Return to Driving    Driving is NOT permitted for 24 hours after surgery     Return to work    You may return to work after 72 hours if you are feeling well and your job does not involve heavy lifting.     Shower / Bathing    You may shower on the day after your procedure.  DO NOT soak of wrist with the puncture site in water for 3 days to prevent infection. DO NOT take a tub bath or wash dishes for 3 days after the procedure     Dressing Removal    Remove the band-aid on the puncture site after 24 hours and leave open to air. If minor oozing, you may apply a band-aid and remove after 12 hours     Reason aspirin not prescribed from this order set    Defer to inpatient service     Reason Lipid Lowering Medications not prescribed from this order set    Defer to inpatient service     Reason for your hospital stay    Non-ST elevation MI     Follow-up and recommended labs and tests     Follow up with primary care provider, [facility], within 7 days for hospital follow- up.    Follow-up with cardiology in 1 month as advised  Follow-up with cardiac rehab next available     Activity    Your activity upon discharge: activity as tolerated.  Follow post coronary artery angiogram precautions     Monitor and record    blood pressure and pulse twice daily and readings to cardiology/PCP for any medication adjustment.     Discharge Instructions    Do not take your metformin for next 48 hours given recent contrast dye exposure.     Discharge Instructions    If you monitor your blood pressure twice daily and blood pressure consistently more than 130/80 may go up on lisinopril up to 40 mg daily.  If blood pressure consistently still elevated talk to your PCP or cardiology if you can start taking your on your PTA amlodipine or if metoprolol dose also needs to be adjusted     Diet    Follow this diet upon discharge: Orders Placed This Encounter      Low Saturated Fat Na <2400 mg        Significant Results and Procedures   Results for orders placed or performed during the hospital encounter of 05/25/24   Cardiac Catheterization    Narrative      RPDA lesion is 70% stenosed.    Prox LAD to Mid LAD lesion is 85% stenosed.    Dist LAD lesion is 85% stenosed.    Prox Cx lesion is 70% stenosed.    Mid Cx lesion is 65% stenosed.    IVUS was performed on the lesion.    Right dominant coronary anatomy.  Severe obstructive coronary artery disease involving proximal and distal   LAD  Severe obstructive coronary disease involving proximal and mid circumflex.  Successful IVUS guided percutaneous coronary intervention of the proximal   Lcx lesion with 1 drug-eluting stent-Synergy XD 4.0X 20 mm.  Successful IVUS guided percutaneous coronary intervention of the mid   circumflex lesion with 1 drug-eluting stent-Synergy XD 3.0 X12 millimeter.  Successful IVUS guided percutaneous coronary intervention of the   proximal/mid LAD lesion with 1 drug-eluting stent-Synergy XD 2.5X 38 mm.  Successful percutaneous coronary intervention of the distal LAD lesion   with 1 drug-eluting stent-Synergy XD 2.5X 16 mm.  Excellent angiographic results with TANYA-3 flow.  No complications noted.  Recommend medical management of the very distal RPDA lesion.         Discharge Medications   Current Discharge Medication List        START taking these medications    Details   lisinopril (ZESTRIL) 20 MG tablet Take 1 tablet (20 mg) by mouth daily  Qty: 30 tablet, Refills: 0    Comments: Future refills by PCP [facility]. [facility] with phone number [phone].  Associated Diagnoses: NSTEMI (non-ST elevated myocardial infarction) (H)      metoprolol tartrate (LOPRESSOR) 25 MG tablet Take 1 tablet (25 mg) by mouth 2 times daily  Qty: 60 tablet, Refills: 0    Comments: Future refills by PCP Dr. [facility] with phone number [phone].  Associated Diagnoses: NSTEMI (non-ST elevated myocardial infarction) (H)       nitroGLYcerin (NITROSTAT) 0.4 MG sublingual tablet For chest pain place 1 tablet under the tongue every 5 minutes for 3 doses. If symptoms persist 5 minutes after 1st dose call 911.  Qty: 15 tablet, Refills: 0    Associated Diagnoses: NSTEMI (non-ST elevated myocardial infarction) (H)      rosuvastatin (CRESTOR) 20 MG tablet Take 1 tablet (20 mg) by mouth at bedtime  Qty: 30 tablet, Refills: 0    Comments: Future refills by PCP Dr. [facility] with phone number [phone].  Associated Diagnoses: NSTEMI (non-ST elevated myocardial infarction) (H)      ticagrelor (BRILINTA) 90 MG tablet Take 1 tablet (90 mg) by mouth every 12 hours  Qty: 60 tablet, Refills: 0    Comments: Future refills by PCP Dr. [facility] with phone number [phone].  Associated Diagnoses: NSTEMI (non-ST elevated myocardial infarction) (H)           CONTINUE these medications which have NOT CHANGED    Details   amLODIPine (NORVASC) 5 MG tablet Take 1 tablet by mouth daily      aspirin (ASA) 81 MG chewable tablet Take 1 tablet (81 mg) by mouth daily    Associated Diagnoses: NSTEMI (non-ST elevated myocardial infarction) (H)      glipiZIDE (GLUCOTROL XL) 5 MG 24 hr tablet Take 10 mg by mouth daily (with breakfast)      metFORMIN (GLUCOPHAGE XR) 500 MG 24 hr tablet Take 1,000 mg by mouth 2 times daily (with meals)           Allergies   No Known Allergies

## 2024-05-26 NOTE — PLAN OF CARE
[id number]  [facility]. Pt aox4, SBA, RA and full code. Tele SR 1st AVB. Pt denies CP and SOB. Angio completed on 5/25/24. 4 stents completed. R radial CDI, soft and CMS intact.   Plan: Cards rehab.

## 2024-05-26 NOTE — DISCHARGE INSTRUCTIONS
Cardiac Angiogram Discharge Instructions - Radial    After you go home:    Have an adult stay with you until tomorrow.  Drink extra fluids for 2 days.  You may resume your normal diet.  No smoking       For 24 hours - due to the sedation you received:  Relax and take it easy.  Do NOT make any important or legal decisions.  Do NOT drive or operate machines at home or at work.  Do NOT drink alcohol.    Care of Wrist Puncture Site:    For the first 24 hrs - check the puncture site every 1-2 hours while awake.  It is normal to have soreness at the puncture site and mild tingling in your hand for up to 3 days.  Remove the bandaid after 24 hours. If there is minor oozing, apply another bandaid and remove it after 12 hours.  You may shower tomorrow.  Do NOT take a bath, or use a hot tub or pool for at least 3 days. Do NOT scrub the site. Do not use lotion or powder near the puncture site.           Activity:        For 2 days:   do not use your hand or arm to support your weight (such as rising from a chair)   do not bend your wrist (such as lifting a garage door).  do not lift more than 5 pounds or exercise your arm (such as tennis, golf or bowling).  Do NOT do any heavy activity such as exercise, lifting, or straining.     Bleeding:    If you start bleeding from the site in your wrist, sit down and press firmly on/above the site for 10 minutes.   Once bleeding stops, keep arm still for 2 hours.   Call [facility] as soon as you can.       Call 911 right away if you have heavy bleeding or bleeding that does not stop.      Medicines:    If you are taking an antiplatelet medication such as Plavix, Brilinta or Effient, do not stop taking it until you talk to your cardiologist.      If you are on Metformin (Glucophage), do not restart it until you have blood tests (within 2 to 3 days after discharge).  After you have your blood drawn, you may restart the Metformin.   Take your medications, including blood thinners, unless your  provider tells you not to.    If you take Coumadin (Warfarin), have your INR checked by your provider in  3-5 days. Call your clinic to schedule this.  If you have stopped any medicines, check with your provider about when to restart them.    Follow Up Appointments:    Follow up with [facility] Nurse Practitioner at [facility] of patient preference in 7-10 days.    Call the clinic if:    You have a large or growing hard lump around the site.  The site is red, swollen, hot or tender.  Blood or fluid is draining from the site.  You have chills or a fever greater than 101 F (38 C).  Your arm feels numb, cool or changes color.  You have hives, a rash or unusual itching.  Any questions or concerns.          [facility] [place]:    [phone] [facility] (7 days a week)    Or you may contact your provider via My Chart

## 2024-05-26 NOTE — PROGRESS NOTES
05/26/24 1100   Appointment Info   Signing Clinician's Name / Credentials (PT) Rose Schwarz PT   Living Environment   People in Home spouse   Current Living Arrangements house   Home Accessibility stairs within home   Number of Stairs, Within Home, Primary seven   Stair Railings, Within Home, Primary railings safe and in good condition;railing on right side (ascending)   Transportation Anticipated family or friend will provide   Self-Care   Usual Activity Tolerance good   Current Activity Tolerance good   Regular Exercise Yes   Activity/Exercise Type walking   Exercise Amount/Frequency daily   Equipment Currently Used at Home none   Fall history within last six months no   General Information   Onset of Illness/Injury or Date of Surgery 05/25/24   Referring Physician Dr. Alvarado   Patient/Family Therapy Goals Statement (PT) To go home   Pertinent History of Current Problem (include personal factors and/or comorbidities that impact the POC) Pt is a 74 year old male admitted with NSTEMI, now s/p PCI x 4.   Existing Precautions/Restrictions cardiac   Cognition   Affect/Mental Status (Cognition) WFL   Orientation Status (Cognition) oriented x 4   Follows Commands (Cognition) WFL   Pain Assessment   Patient Currently in Pain No   Range of Motion (ROM)   Range of Motion ROM is WFL   Strength (Manual Muscle Testing)   Strength (Manual Muscle Testing) strength is WFL   Bed Mobility   Bed Mobility no deficits identified   Transfers   Transfers no deficits identified   Gait/Stairs (Locomotion)   Gates Level (Gait) independent   Balance   Balance Comments Good   Clinical Impression   Criteria for Skilled Therapeutic Intervention Yes, treatment indicated   PT Diagnosis (PT) Impaired endurance   Influenced by the following impairments Decreased endurance   Functional limitations due to impairments Difficulty with long distance ambulation   Clinical Presentation (PT Evaluation Complexity) stable   Clinical  Presentation Rationale VSS, pain controlled   Clinical Decision Making (Complexity) low complexity   Planned Therapy Interventions (PT) patient/family education;progressive activity/exercise;risk factor education;home program guidelines   Risk & Benefits of therapy have been explained evaluation/treatment results reviewed;care plan/treatment goals reviewed;risks/benefits reviewed;current/potential barriers reviewed;participants voiced agreement with care plan;participants included;patient   PT Total Evaluation Time   PT Eval, Low Complexity Minutes (53197) 10   PT Discharge Planning   PT Plan Trial treadmill, stairs, review education   PT Discharge Recommendation (DC Rec) home with outpatient cardiac rehab   PT Rationale for DC Rec Pt is independent with mobility and safe to discharge home. Pt will benefit from outpatient CR to further increase activity tolerance and for cardiac education. r   PT Brief overview of current status Independent   Total Session Time   Total Session Time (sum of timed and untimed services) 10

## 2024-05-26 NOTE — PROGRESS NOTES
[facility]    Cardiology Consultation - Progress Note     Date of Admission:  5/25/2024  Date of Service: 05/26/24    Reason for Consult   Reason for consult: NSTEMI    History of Present Illness   Kaden Cain is a 74 year old male who was admitted on 5/25/2024 for NSTEMI. He was transferred from [facility] to [facility] yesterday for an angiogram.    Angiogram done yesterday via right radial artery. He had significant obstructive disease and underwent PCI with JOSE x4 to the prox-mid LAD, distal LAD, proximal circumflex and mid-circumflex. There is residual disease in the rPDA.     TTE prior to transfer showed LVEF 50-55%, moderate lateral and anterolateral hypokinesis.     On arrival to the floor he was chest pain free. Vital signs stable. Labs today show stable hemoglobin and renal function.     Assessment & Plan   # NSTEMI, s/p PCI with JOSE x4 to prox-mid LAD, distal LAD, proximal circumflex and mid-circumflex  # Type 2 diabetes  # Hypertension    Mr. Cain is doing well today. He remains chest pain free, and has already worked with cardiac rehab this morning. We reviewed the results of his angiogram, and recommendations for ongoing care as outlined below. His daughter asked several insightful questions, which I answered to the best of my ability.  It would be reasonable for him to discharge from a cardiac perspective. Return precautions reviewed.     Plan:  Continue aspirin lifelong.  Continue ticagrelor 90 mg BID for at least 1 year post PCI.   Continue statin.   Agree with metoprolol and lisinopril. Would favor uptitration of lisinopril over resuming his home amlodipine.  Cardiac rehab.   Outpatient follow up 1 month with [facility] (ordered).    Mariia Castillo MD    Primary Care Physician   [facility]    Patient Active Problem List   Diagnosis    NSTEMI (non-ST elevated myocardial infarction) (H)       Past Medical History   I have reviewed this patient's medical  history and updated it with pertinent information if needed.   No past medical history on file.    Past Surgical History   I have reviewed this patient's surgical history and updated it with pertinent information if needed.  No past surgical history on file.    Prior to Admission Medications   Prior to Admission Medications   Prescriptions Last Dose Informant Patient Reported? Taking?   amLODIPine (NORVASC) 5 MG tablet 5/24/2024 at am  Yes Yes   Sig: Take 1 tablet by mouth daily   aspirin (ASA) 81 MG chewable tablet 5/25/2024 at am  No Yes   Sig: Take 1 tablet (81 mg) by mouth daily   glipiZIDE (GLUCOTROL XL) 5 MG 24 hr tablet 5/24/2024 at am  Yes Yes   Sig: Take 10 mg by mouth daily (with breakfast)   metFORMIN (GLUCOPHAGE XR) 500 MG 24 hr tablet 5/24/2024 at am  Yes Yes   Sig: Take 1,000 mg by mouth 2 times daily (with meals)      Facility-Administered Medications: None     Current Facility-Administered Medications   Medication Dose Route Frequency Provider Last Rate Last Admin    acetaminophen (TYLENOL) tablet 650 mg  650 mg Oral Q4H PRN Ok Desir PA-C        Or    acetaminophen (TYLENOL) Suppository 650 mg  650 mg Rectal Q4H PRN Ok Desir PA-C        aspirin (ASA) chewable tablet 81 mg  81 mg Oral Daily Ok Desir PA-C        bisacodyl (DULCOLAX) suppository 10 mg  10 mg Rectal Daily PRN Ok Desir PA-C        calcium carbonate (TUMS) chewable tablet 1,000 mg  1,000 mg Oral 4x Daily PRN Ok Desir PA-C        Continuing beta blocker from home medication list OR beta blocker order already placed during this visit   Does not apply DOES NOT GO TO Anthony Bonilla MD        Continuing statin from home medication list OR statin order already placed during this visit   Does not apply DOES NOT GO TO Anthony Bonilla MD        glucose gel 15-30 g  15-30 g Oral Q15 Min PRN Ok Desir PA-C        Or    dextrose 50 %  injection 25-50 mL  25-50 mL Intravenous Q15 Min PRN Ok Desir PA-C        Or    glucagon injection 1 mg  1 mg Subcutaneous Q15 Min PRN Ok Desir PA-C        fentaNYL (PF) (SUBLIMAZE) injection 25 mcg  25 mcg Intravenous Q15 Min PRN Anthony Newell MD        HOLD: Metformin and metformin containing medications on day of procedure and 48 hours after IV contrast given for patients with acute kidney injury or severe chronic kidney disease (stage IV or stage V; i.e., eGFR less than 30)   Does not apply HOLD Anthony Newell MD        hydrALAZINE (APRESOLINE) injection 10 mg  10 mg Intravenous Q4H PRN Anthony Newell MD        HYDROmorphone (DILAUDID) injection 0.2 mg  0.2 mg Intravenous Q2H PRN Ok Desir PA-C        HYDROmorphone (DILAUDID) injection 0.4 mg  0.4 mg Intravenous Q2H PRN Ok Desir PA-C        insulin aspart (NovoLOG) injection (RAPID ACTING)  1-7 Units Subcutaneous TID AC Ok Desir PA-C   1 Units at 05/25/24 1755    insulin aspart (NovoLOG) injection (RAPID ACTING)  1-5 Units Subcutaneous At Bedtime Ok Desir PA-C   1 Units at 05/25/24 2139    insulin glargine (LANTUS PEN) injection 15 Units  15 Units Subcutaneous At Bedtime Ok Desir PA-C   15 Units at 05/25/24 2138    lidocaine (LMX4) cream   Topical Q1H PRN Ok Desir PA-C        lidocaine 1 % 0.1-1 mL  0.1-1 mL Other Q1H PRN Ok Desir PA-C        lisinopril (ZESTRIL) tablet 10 mg  10 mg Oral Daily Ok Desir PA-C        melatonin tablet 1 mg  1 mg Oral At Bedtime PRN Ok Desir PA-C        metoprolol (LOPRESSOR) injection 5 mg  5 mg Intravenous Q15 Min PRN Anthony Newell MD        metoprolol tartrate (LOPRESSOR) tablet 25 mg  25 mg Oral BID Ok Desir PA-C   25 mg at 05/25/24 2029    midazolam (VERSED) injection 0.5 mg  0.5 mg Intravenous Q5 Min PRN Osiris  Anthony DANIEL MD        naloxone (NARCAN) injection 0.2 mg  0.2 mg Intravenous Q2 Min PRN Lima Alvarado, DO        Or    naloxone (NARCAN) injection 0.4 mg  0.4 mg Intravenous Q2 Min PRN Lima Alvarado, DO        Or    naloxone (NARCAN) injection 0.2 mg  0.2 mg Intramuscular Q2 Min PRN Lima Alvarado E, DO        Or    naloxone (NARCAN) injection 0.4 mg  0.4 mg Intramuscular Q2 Min PRN Lima Alvarado, DO        nitroGLYcerin (NITROSTAT) sublingual tablet 0.4 mg  0.4 mg Sublingual Q5 Min PRN Anthony Newell MD        ondansetron (ZOFRAN ODT) ODT tab 4 mg  4 mg Oral Q6H PRN Ok Desir PA-C        Or    ondansetron (ZOFRAN) injection 4 mg  4 mg Intravenous Q6H PRN Ok Desir PA-C        oxyCODONE (ROXICODONE) tablet 5 mg  5 mg Oral Q4H PRN Ok Desir PA-C        oxyCODONE IR (ROXICODONE) half-tab 2.5 mg  2.5 mg Oral Q4H PRN Ok Desir PA-C        Patient is already receiving anticoagulation with heparin, enoxaparin (LOVENOX), warfarin (COUMADIN)  or other anticoagulant medication   Does not apply Continuous PRN Ok Desir PA-C        Percutaneous Coronary Intervention orders placed (this is information for BPA alerting)   Does not apply DOES NOT GO TO Anthony Bonilla MD        polyethylene glycol (MIRALAX) Packet 17 g  17 g Oral BID PRN Ok Desir PA-C        rosuvastatin (CRESTOR) tablet 20 mg  20 mg Oral At Bedtime Ok Desir PA-C   20 mg at 05/25/24 2029    senna-docusate (SENOKOT-S/PERICOLACE) 8.6-50 MG per tablet 1 tablet  1 tablet Oral BID PRN Ok Desir PA-C        Or    senna-docusate (SENOKOT-S/PERICOLACE) 8.6-50 MG per tablet 2 tablet  2 tablet Oral BID PRN Ok Desir PA-C        sodium chloride (PF) 0.9% PF flush 3 mL  3 mL Intracatheter Q8H Ok Desir PA-C   3 mL at 05/26/24 0010    sodium chloride (PF) 0.9% PF flush 3 mL  3 mL Intracatheter  q1 min prn Ok Desir PA-C        ticagrelor (BRILINTA) tablet 90 mg  90 mg Oral Q12H Anthony Newell MD   90 mg at 05/26/24 0009     Current Facility-Administered Medications   Medication Dose Route Frequency Provider Last Rate Last Admin    acetaminophen (TYLENOL) tablet 650 mg  650 mg Oral Q4H PRN Ok Desir PA-C        Or    acetaminophen (TYLENOL) Suppository 650 mg  650 mg Rectal Q4H PRN Ok Desir PA-C        aspirin (ASA) chewable tablet 81 mg  81 mg Oral Daily Ok Desir PA-C        bisacodyl (DULCOLAX) suppository 10 mg  10 mg Rectal Daily PRN Ok Desir PA-C        calcium carbonate (TUMS) chewable tablet 1,000 mg  1,000 mg Oral 4x Daily PRN Ok Desir PA-C        Continuing beta blocker from home medication list OR beta blocker order already placed during this visit   Does not apply DOES NOT GO TO Anthony Bonilla MD        Continuing statin from home medication list OR statin order already placed during this visit   Does not apply DOES NOT GO TO Anthony Bonilla MD        glucose gel 15-30 g  15-30 g Oral Q15 Min PRN Ok Desir PA-C        Or    dextrose 50 % injection 25-50 mL  25-50 mL Intravenous Q15 Min PRN Ok Desir PA-C        Or    glucagon injection 1 mg  1 mg Subcutaneous Q15 Min PRN Ok Desir PA-C        fentaNYL (PF) (SUBLIMAZE) injection 25 mcg  25 mcg Intravenous Q15 Min PRN Anthony Newell MD        HOLD: Metformin and metformin containing medications on day of procedure and 48 hours after IV contrast given for patients with acute kidney injury or severe chronic kidney disease (stage IV or stage V; i.e., eGFR less than 30)   Does not apply HOLD Anthony Newell MD        hydrALAZINE (APRESOLINE) injection 10 mg  10 mg Intravenous Q4H PRN Anthony Newell MD        HYDROmorphone (DILAUDID) injection 0.2 mg  0.2 mg Intravenous Q2H PRN  Ok Desir PA-C        HYDROmorphone (DILAUDID) injection 0.4 mg  0.4 mg Intravenous Q2H PRN Ok Desir PA-C        insulin aspart (NovoLOG) injection (RAPID ACTING)  1-7 Units Subcutaneous TID AC Ok Desir PA-C   1 Units at 05/25/24 1755    insulin aspart (NovoLOG) injection (RAPID ACTING)  1-5 Units Subcutaneous At Bedtime Ok Desir PA-C   1 Units at 05/25/24 2139    insulin glargine (LANTUS PEN) injection 15 Units  15 Units Subcutaneous At Bedtime Ok Desir PA-C   15 Units at 05/25/24 2138    lidocaine (LMX4) cream   Topical Q1H PRN Ok Desir PA-C        lidocaine 1 % 0.1-1 mL  0.1-1 mL Other Q1H PRN Ok Desir PA-C        lisinopril (ZESTRIL) tablet 10 mg  10 mg Oral Daily Ok Desir PA-C        melatonin tablet 1 mg  1 mg Oral At Bedtime PRN Ok Desir PA-C        metoprolol (LOPRESSOR) injection 5 mg  5 mg Intravenous Q15 Min PRN Anthony Newell MD        metoprolol tartrate (LOPRESSOR) tablet 25 mg  25 mg Oral BID Ok Desir PA-C   25 mg at 05/25/24 2029    midazolam (VERSED) injection 0.5 mg  0.5 mg Intravenous Q5 Min PRN Anthony Newell MD        naloxone (NARCAN) injection 0.2 mg  0.2 mg Intravenous Q2 Min PRN Lima Alvarado DO        Or    naloxone (NARCAN) injection 0.4 mg  0.4 mg Intravenous Q2 Min PRN Lima Alvarado, DO        Or    naloxone (NARCAN) injection 0.2 mg  0.2 mg Intramuscular Q2 Min PRN Lima Alvarado, DO        Or    naloxone (NARCAN) injection 0.4 mg  0.4 mg Intramuscular Q2 Min PRN Lima Alvarado, DO        nitroGLYcerin (NITROSTAT) sublingual tablet 0.4 mg  0.4 mg Sublingual Q5 Min PRN Anthony Newell MD        ondansetron (ZOFRAN ODT) ODT tab 4 mg  4 mg Oral Q6H PRN Ok Desir PA-C        Or    ondansetron (ZOFRAN) injection 4 mg  4 mg Intravenous Q6H PRN Ok Desir PA-C         oxyCODONE (ROXICODONE) tablet 5 mg  5 mg Oral Q4H PRN Ok Desir PA-C        oxyCODONE IR (ROXICODONE) half-tab 2.5 mg  2.5 mg Oral Q4H PRN Ok Desir PA-C        Patient is already receiving anticoagulation with heparin, enoxaparin (LOVENOX), warfarin (COUMADIN)  or other anticoagulant medication   Does not apply Continuous PRN Ok Desir PA-C        Percutaneous Coronary Intervention orders placed (this is information for BPA alerting)   Does not apply DOES NOT GO TO Anthony Bonilla MD        polyethylene glycol (MIRALAX) Packet 17 g  17 g Oral BID PRN Ok Desir PA-C        rosuvastatin (CRESTOR) tablet 20 mg  20 mg Oral At Bedtime Ok Desir PA-C   20 mg at 05/25/24 2029    senna-docusate (SENOKOT-S/PERICOLACE) 8.6-50 MG per tablet 1 tablet  1 tablet Oral BID PRN Ok Desir PA-C        Or    senna-docusate (SENOKOT-S/PERICOLACE) 8.6-50 MG per tablet 2 tablet  2 tablet Oral BID PRN Ok Desir PA-C        sodium chloride (PF) 0.9% PF flush 3 mL  3 mL Intracatheter Q8H Ok Desir PA-C   3 mL at 05/26/24 0010    sodium chloride (PF) 0.9% PF flush 3 mL  3 mL Intracatheter q1 min prn Ok Desir PA-C        ticagrelor (BRILINTA) tablet 90 mg  90 mg Oral Q12H Anthony Newell MD   90 mg at 05/26/24 0009     Allergies   No Known Allergies    Social History        Family History   No family history on file.    Review of Systems   The comprehensive Review of Systems is negative other than noted in the HPI or here.     Physical Exam   Vital Signs with Ranges  Temp:  [98  F (36.7  C)-98.4  F (36.9  C)] 98.3  F (36.8  C)  Pulse:  [] 85  Resp:  [16-18] 18  BP: (122-154)/() 147/88  SpO2:  [95 %-98 %] 96 %  Wt Readings from Last 4 Encounters:   05/26/24 90.8 kg (200 lb 1.6 oz)   05/24/24 93.8 kg (206 lb 11.2 oz)     No intake/output data recorded.      Vitals: BP (!) 147/88    "Pulse 85   Temp 98.3  F (36.8  C) (Oral)   Resp 18   Ht 1.854 m (6' 1\")   Wt 90.8 kg (200 lb 1.6 oz)   SpO2 96%   BMI 26.40 kg/m      General: Alert, oriented, in no acute distress  HEENT: PERRLA, mucous membranes moist  Skin: Right radial access site clean/dry/intact. No hematoma.  Neck: Normal JVP  CV: Regular rate and rhythm without murmur  Respiratory: Clear to auscultation bilaterally  GI: Normoactive bowel sounds; soft, non-tender abdomen  Neuro: No focal deficits appreciated  Extremities: No peripheral edema bilaterally    Recent Labs   Lab 05/26/24  0721 05/26/24  0609 05/26/24  0251 05/25/24  1302 05/25/24  1156 05/25/24  0745 05/25/24  0554 05/24/24  1808 05/24/24  1654   WBC  --  11.9*  --   --  9.1  --  7.4  --  7.6   HGB  --  14.8  --   --  13.9  --  14.0  --  14.5   MCV  --  88  --   --  89  --  90  --  88   PLT  --  306  --   --  287  --  277  --  315   NA  --  135  --   --   --   --  140  --  134*   POTASSIUM  --  4.2  --   --   --   --  4.3  --  4.4   CHLORIDE  --  100  --   --   --   --  103  --  98   CO2  --  23  --   --   --   --  25  --  23   BUN  --  13.4  --   --   --   --  15.4  --  17.1   CR  --  0.94  --   --   --   --  0.99  --  0.95   GFRESTIMATED  --  86  --   --   --   --  80  --  85   ANIONGAP  --  12  --   --   --   --  12  --  13   BIRGIT  --  8.7*  --   --   --   --  8.9  --  9.0   * 180* 179*   < >  --    < > 119*   < > 390*    < > = values in this interval not displayed.     Recent Labs   Lab Test 05/25/24  1615 05/24/24  1654   CHOL 146 167   HDL 42 45   LDL 91 98   TRIG 64 119     Recent Labs   Lab 05/26/24  0609 05/25/24  1156 05/25/24  0554   WBC 11.9* 9.1 7.4   HGB 14.8 13.9 14.0   HCT 43.0 40.5 40.9   MCV 88 89 90    287 277     No results for input(s): \"PH\", \"PHV\", \"PO2\", \"PO2V\", \"SAT\", \"PCO2\", \"PCO2V\", \"HCO3\", \"HCO3V\" in the last 168 hours.  No results for input(s): \"NTBNPI\", \"NTBNP\" in the last 168 hours.  No results for input(s): \"DD\" in the last 168 " "hours.  No results for input(s): \"SED\", \"CRP\" in the last 168 hours.  Recent Labs   Lab 24  0609 24  1156 24  0554    287 277     No results for input(s): \"TSH\" in the last 168 hours.  No results for input(s): \"COLOR\", \"APPEARANCE\", \"URINEGLC\", \"URINEBILI\", \"URINEKETONE\", \"SG\", \"UBLD\", \"URINEPH\", \"PROTEIN\", \"UROBILINOGEN\", \"NITRITE\", \"LEUKEST\", \"RBCU\", \"WBCU\" in the last 168 hours.    Imaging:  Recent Results (from the past 48 hour(s))   Chest XR,  PA & LAT    Narrative    EXAM: XR CHEST 2 VIEWS  LOCATION: [facility]  DATE: 2024    INDICATION: chest pain  COMPARISON: None.      Impression    IMPRESSION:     Heart size is normal. Lungs are clear bilaterally. Mediastinum and visualized bony structures are unremarkable.   Echocardiogram Complete   Result Value    LVEF  50-55%    Narrative    [id number]  [id number]  [id number]  [id number]^[id number]^JIMMY     [facility]  Echocardiography Laboratory  [address] [place] [address]     Name: ROXANNE STEPHENS  MRN: [id number]  : 1950  Study Date: 2024 07:56 AM  Age: 74 yrs  Gender: Male  Patient Location: [facility]  Reason For Study: Chest Pain, Chest Tightness, Chest Pressure  Ordering Physician: JIMMY MARTINS  Referring Physician: Rowena Primary  Performed By: Km Gallo RDCS     BSA: 2.2 m2  Height: 73 in  Weight: 206 lb  HR: 69  BP: 154/110 mmHg  ______________________________________________________________________________  Procedure  Complete Portable Echo Adult. Optison (NDC #[id number]) given intravenously.  ______________________________________________________________________________  Interpretation Summary     The visual ejection fraction is estimated at 50-55%.  There is moderate lateral and anterolateral hypokinesis concerning for  obstructive CAD in the LCX or Diagonal territories.  The right ventricle is normal in size and function.  There is no pericardial effusion.  The inferior vena " cava was normal in size with preserved respiratory  variability.  ______________________________________________________________________________  Left Ventricle  The left ventricle is normal in size. There is mild concentric left  ventricular hypertrophy. The visual ejection fraction is estimated at 50-55%.  There is moderate lateral and anterolateral hypokinesis concerning for  obstructive CAD in the LCX or Diagonal territories.     Right Ventricle  The right ventricle is normal in size and function.     Atria  Normal left atrial size. Right atrial size is normal.     Mitral Valve  The mitral valve is normal in structure and function. There is trace mitral  regurgitation. There is no mitral valve stenosis. The mean mitral valve  gradient is 1.1 mmHg. The peak mitral valve gradient is 2.3 mmHg.     Tricuspid Valve  The tricuspid valve is not well visualized, but is grossly normal. Right  ventricular systolic pressure could not be approximated due to inadequate  tricuspid regurgitation.     Aortic Valve  The aortic valve is normal in structure and function. No aortic regurgitation  is present. No aortic stenosis is present.     Pulmonic Valve  The pulmonic valve is not well visualized.     Vessels  The aortic Sinus(es) of Valsalva are mildly dilated. Normal size ascending  aorta. The inferior vena cava was normal in size with preserved respiratory  variability.     Pericardium  There is no pericardial effusion.     ______________________________________________________________________________  MMode/2D Measurements & Calculations  IVSd: 1.2 cm  LVIDd: 4.7 cm  LVIDs: 2.9 cm  LVPWd: 1.1 cm  IVC diam: 1.3 cm  FS: 37.7 %  LV mass(C)d: 194.0 grams  LV mass(C)dI: 89.0 grams/m2     Ao root diam: 3.9 cm  asc Aorta Diam: 3.6 cm  LVOT diam: 2.4 cm  LVOT area: 4.3 cm2  Ao root diam index Ht(cm/m): 2.1  Ao root diam index BSA (cm/m2): 1.8  Asc Ao diam index BSA (cm/m2): 1.6  Asc Ao diam index Ht(cm/m): 1.9  EF Biplane: 61.6  %  LA Volume (BP): 37.7 ml  LA Volume Index (BP): 17.3 ml/m2     RWT: 0.45  TAPSE: 2.2 cm     Time Measurements  Aortic HR: 67.0 BPM     Doppler Measurements & Calculations  MV E max rodrigue: 56.6 cm/sec  MV A max rodrigue: 83.8 cm/sec  MV E/A: 0.68  MV max P.3 mmHg  MV mean P.1 mmHg  MV V2 VTI: 21.7 cm  MVA(VTI): 3.7 cm2  MV dec time: 0.29 sec  LV V1 max PG: 3.2 mmHg  LV V1 max: 89.1 cm/sec  LV V1 VTI: 18.6 cm  CO(LVOT): 5.4 l/min  CI(LVOT): 2.5 l/min/m2  SV(LVOT): 80.8 ml  SI(LVOT): 37.1 ml/m2  PA acc time: 0.15 sec  PI end-d rodrigue: 98.8 cm/sec  E/E' av.3  Lateral E/e': 7.4     Medial E/e': 9.1  RV S Rodrigue: 12.1 cm/sec     ______________________________________________________________________________  Report approved by: Verónica Larsen 2024 09:22 AM         Cardiac Catheterization    Narrative      RPDA lesion is 70% stenosed.    Prox LAD to Mid LAD lesion is 85% stenosed.    Dist LAD lesion is 85% stenosed.    Prox Cx lesion is 70% stenosed.    Mid Cx lesion is 65% stenosed.    IVUS was performed on the lesion.    Right dominant coronary anatomy.  Severe obstructive coronary artery disease involving proximal and distal   LAD  Severe obstructive coronary disease involving proximal and mid circumflex.  Successful IVUS guided percutaneous coronary intervention of the proximal   Lcx lesion with 1 drug-eluting stent-Synergy XD 4.0X 20 mm.  Successful IVUS guided percutaneous coronary intervention of the mid   circumflex lesion with 1 drug-eluting stent-Synergy XD 3.0 X12 millimeter.  Successful IVUS guided percutaneous coronary intervention of the   proximal/mid LAD lesion with 1 drug-eluting stent-Synergy XD 2.5X 38 mm.  Successful percutaneous coronary intervention of the distal LAD lesion   with 1 drug-eluting stent-Synergy XD 2.5X 16 mm.  Excellent angiographic results with TANYA-3 flow.  No complications noted.  Recommend medical management of the very distal RPDA lesion.           Medical Decision Making        60 MINUTES SPENT BY ME on the date of service doing chart review, history, exam, documentation & further activities per the note.

## 2024-05-27 NOTE — PLAN OF CARE
Physical Therapy Discharge Summary    Reason for therapy discharge:    Discharged to home with outpatient therapy.    Progress towards therapy goal(s). See goals on Care Plan in [facility] electronic health record for goal details.  Goals partially met.  Barriers to achieving goals:   discharge on same date as initial evaluation.    Therapy recommendation(s):    Continued therapy is recommended.  Rationale/Recommendations:  To further improve cardiac endurance and education.

## 2024-05-28 LAB
ATRIAL RATE - MUSE: 62 BPM
ATRIAL RATE - MUSE: 88 BPM
DIASTOLIC BLOOD PRESSURE - MUSE: NORMAL MMHG
DIASTOLIC BLOOD PRESSURE - MUSE: NORMAL MMHG
INTERPRETATION ECG - MUSE: NORMAL
INTERPRETATION ECG - MUSE: NORMAL
P AXIS - MUSE: 32 DEGREES
P AXIS - MUSE: 62 DEGREES
PR INTERVAL - MUSE: 218 MS
PR INTERVAL - MUSE: 242 MS
QRS DURATION - MUSE: 90 MS
QRS DURATION - MUSE: 92 MS
QT - MUSE: 372 MS
QT - MUSE: 462 MS
QTC - MUSE: 450 MS
QTC - MUSE: 468 MS
R AXIS - MUSE: -39 DEGREES
R AXIS - MUSE: -47 DEGREES
SYSTOLIC BLOOD PRESSURE - MUSE: NORMAL MMHG
SYSTOLIC BLOOD PRESSURE - MUSE: NORMAL MMHG
T AXIS - MUSE: 110 DEGREES
T AXIS - MUSE: 162 DEGREES
VENTRICULAR RATE- MUSE: 62 BPM
VENTRICULAR RATE- MUSE: 88 BPM

## 2024-05-29 ENCOUNTER — PATIENT OUTREACH (OUTPATIENT)
Dept: CARE COORDINATION | Facility: CLINIC | Age: 74
End: 2024-05-29
Payer: MEDICARE

## 2024-05-29 NOTE — PROGRESS NOTES
Connected Care Resource Center:   [facility] Contact  [facility]/Voicemail     Clinical Data: Post-Discharge Outreach     Outreach attempted x 2. Unable to leave message on patient's voicemail, providing [facility]'s central phone number of [phone] ([phone]) for questions/concerns and/or to schedule an appt with an [facility] provider, if they do not have a PCP.      Plan:  [facility] will do no further outreaches at this time.       MILLER Roy  Connected Care Resource [place], [facility]    *Connected Care Resource Team does NOT follow patient ongoing. Referrals are identified based on internal discharge reports and the outreach is to ensure patient has an understanding of their discharge instructions.

## 2024-06-02 LAB
ATRIAL RATE - MUSE: 79 BPM
ATRIAL RATE - MUSE: 99 BPM
DIASTOLIC BLOOD PRESSURE - MUSE: NORMAL MMHG
DIASTOLIC BLOOD PRESSURE - MUSE: NORMAL MMHG
INTERPRETATION ECG - MUSE: NORMAL
INTERPRETATION ECG - MUSE: NORMAL
P AXIS - MUSE: 75 DEGREES
P AXIS - MUSE: NORMAL DEGREES
PR INTERVAL - MUSE: 216 MS
PR INTERVAL - MUSE: 258 MS
QRS DURATION - MUSE: 80 MS
QRS DURATION - MUSE: 90 MS
QT - MUSE: 386 MS
QT - MUSE: 422 MS
QTC - MUSE: 483 MS
QTC - MUSE: 495 MS
R AXIS - MUSE: -34 DEGREES
R AXIS - MUSE: -60 DEGREES
SYSTOLIC BLOOD PRESSURE - MUSE: NORMAL MMHG
SYSTOLIC BLOOD PRESSURE - MUSE: NORMAL MMHG
T AXIS - MUSE: 123 DEGREES
T AXIS - MUSE: 89 DEGREES
VENTRICULAR RATE- MUSE: 79 BPM
VENTRICULAR RATE- MUSE: 99 BPM

## 2024-06-03 ENCOUNTER — TELEPHONE (OUTPATIENT)
Dept: CARDIOLOGY | Facility: CLINIC | Age: 74
End: 2024-06-03
Payer: MEDICARE

## 2024-06-03 NOTE — TELEPHONE ENCOUNTER
Health Call Center    Phone Message    May a detailed message be left on voicemail: yes     Reason for Call: Other: Patient has an appt tomorrow at 10:30 AM to establish care with .     Action Taken: Message routed to:  Other: Cardiology    Travel Screening: Not Applicable     Thank you!  Specialty Access Center

## 2024-06-03 NOTE — TELEPHONE ENCOUNTER
[facility] Call Center    Phone Message    May a detailed message be left on voicemail: yes     Reason for Call: Other: Patient called requesting to speak with a member of his care team in regards to what medications he should be taking and what he should have refilled. Please call back to further discuss.     Action Taken: Message routed to:  Other: Cardiology    Travel Screening: Not Applicable     Thank you!  [facility] Access Center

## 2024-06-04 NOTE — PROGRESS NOTES
RN called out to pt. No answer, left VM with call back number. Sammie Elias RN on 6/4/2024 at 9:13 AM

## 2024-06-05 NOTE — PROGRESS NOTES
Second attempt, RN called out to pt to follow up on any questions he may have. No answer, left VM with call back number. Sammie Elias RN on 6/5/2024 at 10:48 AM    June 6, 2024    Third attempt, RN called out to pt to follow up on message received. No answer, left VM. RN will close out encounter as 3 attempts were made. Sammie Elias RN on 6/6/2024 at 2:59 PM

## 2024-06-12 ENCOUNTER — HOSPITAL ENCOUNTER (OUTPATIENT)
Dept: CARDIAC REHAB | Facility: CLINIC | Age: 74
Discharge: HOME | End: 2024-06-12
Attending: HOSPITALIST
Payer: MEDICARE

## 2024-06-12 DIAGNOSIS — I21.4 NSTEMI (NON-ST ELEVATED MYOCARDIAL INFARCTION) (H): ICD-10-CM

## 2024-06-12 PROCEDURE — 93798 PHYS/QHP OP CAR RHAB W/ECG: CPT | Performed by: REHABILITATION PRACTITIONER

## 2024-06-12 PROCEDURE — 93797 PHYS/QHP OP CAR RHAB WO ECG: CPT | Performed by: REHABILITATION PRACTITIONER

## 2024-06-14 ENCOUNTER — HOSPITAL ENCOUNTER (OUTPATIENT)
Dept: CARDIAC REHAB | Facility: CLINIC | Age: 74
Discharge: HOME | End: 2024-06-14
Attending: HOSPITALIST
Payer: MEDICARE

## 2024-06-14 PROCEDURE — 93798 PHYS/QHP OP CAR RHAB W/ECG: CPT | Performed by: REHABILITATION PRACTITIONER

## 2024-06-17 ENCOUNTER — HOSPITAL ENCOUNTER (OUTPATIENT)
Dept: CARDIAC REHAB | Facility: CLINIC | Age: 74
Discharge: HOME | End: 2024-06-17
Attending: HOSPITALIST
Payer: MEDICARE

## 2024-06-17 PROCEDURE — 93798 PHYS/QHP OP CAR RHAB W/ECG: CPT

## 2024-06-19 ENCOUNTER — HOSPITAL ENCOUNTER (OUTPATIENT)
Dept: CARDIAC REHAB | Facility: CLINIC | Age: 74
Discharge: HOME | End: 2024-06-19
Attending: HOSPITALIST
Payer: MEDICARE

## 2024-06-19 PROCEDURE — 93797 PHYS/QHP OP CAR RHAB WO ECG: CPT | Mod: 59

## 2024-06-19 PROCEDURE — 93798 PHYS/QHP OP CAR RHAB W/ECG: CPT

## 2024-06-21 ENCOUNTER — HOSPITAL ENCOUNTER (OUTPATIENT)
Dept: CARDIAC REHAB | Facility: CLINIC | Age: 74
Discharge: HOME | End: 2024-06-21
Attending: HOSPITALIST
Payer: MEDICARE

## 2024-06-21 PROCEDURE — 93798 PHYS/QHP OP CAR RHAB W/ECG: CPT | Performed by: OCCUPATIONAL THERAPIST

## 2024-06-24 ENCOUNTER — HOSPITAL ENCOUNTER (OUTPATIENT)
Dept: CARDIAC REHAB | Facility: CLINIC | Age: 74
Discharge: HOME | End: 2024-06-24
Attending: HOSPITALIST
Payer: MEDICARE

## 2024-06-24 PROCEDURE — 93798 PHYS/QHP OP CAR RHAB W/ECG: CPT

## 2024-06-28 ENCOUNTER — HOSPITAL ENCOUNTER (OUTPATIENT)
Dept: CARDIAC REHAB | Facility: CLINIC | Age: 74
Discharge: HOME | End: 2024-06-28
Attending: HOSPITALIST
Payer: MEDICARE

## 2024-06-28 PROCEDURE — 93798 PHYS/QHP OP CAR RHAB W/ECG: CPT

## 2024-07-01 ENCOUNTER — HOSPITAL ENCOUNTER (OUTPATIENT)
Dept: CARDIAC REHAB | Facility: CLINIC | Age: 74
Discharge: HOME | End: 2024-07-01
Attending: HOSPITALIST
Payer: MEDICARE

## 2024-07-01 PROCEDURE — 93798 PHYS/QHP OP CAR RHAB W/ECG: CPT | Performed by: REHABILITATION PRACTITIONER

## 2024-07-03 ENCOUNTER — HOSPITAL ENCOUNTER (OUTPATIENT)
Dept: CARDIAC REHAB | Facility: CLINIC | Age: 74
Discharge: HOME | End: 2024-07-03
Attending: HOSPITALIST
Payer: MEDICARE

## 2024-07-03 PROCEDURE — 93798 PHYS/QHP OP CAR RHAB W/ECG: CPT | Performed by: REHABILITATION PRACTITIONER

## 2024-07-05 ENCOUNTER — HOSPITAL ENCOUNTER (OUTPATIENT)
Dept: CARDIAC REHAB | Facility: CLINIC | Age: 74
Discharge: HOME | End: 2024-07-05
Attending: HOSPITALIST
Payer: MEDICARE

## 2024-07-05 PROCEDURE — 93798 PHYS/QHP OP CAR RHAB W/ECG: CPT

## 2024-07-08 ENCOUNTER — HOSPITAL ENCOUNTER (OUTPATIENT)
Dept: CARDIAC REHAB | Facility: CLINIC | Age: 74
Discharge: HOME | End: 2024-07-08
Attending: HOSPITALIST
Payer: MEDICARE

## 2024-07-08 PROCEDURE — 93798 PHYS/QHP OP CAR RHAB W/ECG: CPT | Performed by: OCCUPATIONAL THERAPIST

## 2024-07-10 ENCOUNTER — HOSPITAL ENCOUNTER (OUTPATIENT)
Dept: CARDIAC REHAB | Facility: CLINIC | Age: 74
Discharge: HOME | End: 2024-07-10
Attending: HOSPITALIST
Payer: MEDICARE

## 2024-07-10 PROCEDURE — 93798 PHYS/QHP OP CAR RHAB W/ECG: CPT

## 2024-07-12 ENCOUNTER — HOSPITAL ENCOUNTER (OUTPATIENT)
Dept: CARDIAC REHAB | Facility: CLINIC | Age: 74
Discharge: HOME | End: 2024-07-12
Attending: HOSPITALIST
Payer: MEDICARE

## 2024-07-12 PROCEDURE — 93798 PHYS/QHP OP CAR RHAB W/ECG: CPT

## 2024-07-15 ENCOUNTER — HOSPITAL ENCOUNTER (OUTPATIENT)
Dept: CARDIAC REHAB | Facility: CLINIC | Age: 74
Discharge: HOME | End: 2024-07-15
Attending: HOSPITALIST
Payer: MEDICARE

## 2024-07-15 PROCEDURE — 93798 PHYS/QHP OP CAR RHAB W/ECG: CPT | Performed by: REHABILITATION PRACTITIONER

## 2024-07-17 ENCOUNTER — HOSPITAL ENCOUNTER (OUTPATIENT)
Dept: CARDIAC REHAB | Facility: CLINIC | Age: 74
Discharge: HOME | End: 2024-07-17
Attending: HOSPITALIST
Payer: MEDICARE

## 2024-07-17 PROCEDURE — 93798 PHYS/QHP OP CAR RHAB W/ECG: CPT | Performed by: REHABILITATION PRACTITIONER

## 2024-07-19 ENCOUNTER — HOSPITAL ENCOUNTER (OUTPATIENT)
Dept: CARDIAC REHAB | Facility: CLINIC | Age: 74
Discharge: HOME | End: 2024-07-19
Attending: HOSPITALIST
Payer: MEDICARE

## 2024-07-19 PROCEDURE — 93798 PHYS/QHP OP CAR RHAB W/ECG: CPT

## 2024-07-22 ENCOUNTER — HOSPITAL ENCOUNTER (OUTPATIENT)
Dept: CARDIAC REHAB | Facility: CLINIC | Age: 74
Discharge: HOME | End: 2024-07-22
Attending: HOSPITALIST
Payer: MEDICARE

## 2024-07-22 PROCEDURE — 93798 PHYS/QHP OP CAR RHAB W/ECG: CPT | Performed by: REHABILITATION PRACTITIONER

## 2024-07-24 ENCOUNTER — HOSPITAL ENCOUNTER (OUTPATIENT)
Dept: CARDIAC REHAB | Facility: CLINIC | Age: 74
Discharge: HOME | End: 2024-07-24
Attending: HOSPITALIST
Payer: MEDICARE

## 2024-07-24 PROCEDURE — 93798 PHYS/QHP OP CAR RHAB W/ECG: CPT

## 2024-07-26 ENCOUNTER — HOSPITAL ENCOUNTER (OUTPATIENT)
Dept: CARDIAC REHAB | Facility: CLINIC | Age: 74
Discharge: HOME | End: 2024-07-26
Attending: HOSPITALIST
Payer: MEDICARE

## 2024-07-26 PROCEDURE — 93798 PHYS/QHP OP CAR RHAB W/ECG: CPT

## 2024-07-29 ENCOUNTER — HOSPITAL ENCOUNTER (OUTPATIENT)
Dept: CARDIAC REHAB | Facility: CLINIC | Age: 74
Discharge: HOME | End: 2024-07-29
Attending: HOSPITALIST
Payer: MEDICARE

## 2024-07-29 PROCEDURE — 93798 PHYS/QHP OP CAR RHAB W/ECG: CPT

## 2024-07-31 ENCOUNTER — HOSPITAL ENCOUNTER (OUTPATIENT)
Dept: CARDIAC REHAB | Facility: CLINIC | Age: 74
Discharge: HOME | End: 2024-07-31
Attending: HOSPITALIST
Payer: MEDICARE

## 2024-07-31 PROCEDURE — 93798 PHYS/QHP OP CAR RHAB W/ECG: CPT | Performed by: REHABILITATION PRACTITIONER

## 2024-08-01 ENCOUNTER — HOSPITAL ENCOUNTER (OUTPATIENT)
Dept: CARDIAC REHAB | Facility: CLINIC | Age: 74
Discharge: HOME | End: 2024-08-01
Attending: HOSPITALIST
Payer: MEDICARE

## 2024-08-01 PROCEDURE — 93798 PHYS/QHP OP CAR RHAB W/ECG: CPT

## 2024-08-01 PROCEDURE — 93797 PHYS/QHP OP CAR RHAB WO ECG: CPT | Mod: 59

## 2024-08-26 NOTE — PROGRESS NOTES
HISTORY OF PRESENT ILLNESS:    This is a 74 year old male who follows with Dr Burns at [facility]  His past medical history includes:  Coronary artery disease, hypertension, type II diabetes, hyperlipidemia and mild ischemic cardiomyopathy    Mr Cain was hospitalized for NSTEMI (5/25/24)  His troponin peaked at 771  ECHO showed LVEF 50-55% with anterolateral hypokinesis, normal RV function, no significant valvular pathology  He was found to have significant  LAD and CFX disease and underwent stenting to his proximal LAD and stenting x2 to his CFX via IVUS  He completed cardiac rehab and made significant gains    Our visit today is for further review and labs      Mr Cain comes in with his wife today  Overall, he has recovered well and is back to his usual activities  He denies any chest pain, shortness of breath, palpitations, orthopnea or peripheral edema  He does not feel any skipped beats   He states that his primary provider refilled his medications and changed his Brilinta to Plavix due to cost purposes    He checks his blood pressure at home and states that his BP stays < 140/90 mmHg at home        VITAL SIGNS:  BP:  146/68, 128/68/ at home  Pulse:  65  Weight:  199 lbs  (BMI: 25)    Labs today: Hgb 12.7  Sodium: 139  Potassium: 4.6  BUN: 16  Creatinine: 1.0  Glucose 169    EKG :  Sinus rhythm with frequent PACs  HR 65 bpm    IMPRESSION AND PLAN:    Coronary Artery Disease:  -s/p NSTEMI and stenting to proximal LAD and stenting x 2 to prox-mid CFX (5/25/24)  -denies angina  -Plavix for at least 1 yr, ASA 81 mg indefinitely    Premature Atrial Contractions  -asymptomatic  -will check Mag, TSH    Ischemic Cardiomyopathy  -LVEF 50-55% with anterolateral hypokinesis  -no signs or symptoms of heart failure  -weight stable  -repeat ECHO in 1 month    Hypertension:  -on Lisinopril 20 mg, Metoprolol 25 mg BID  -BP controlled    Hyperlipidemia:  -on Crestor 20 mg  -will arrange fasting lipids in near  future    Type II Diabetes:  -improving blood sugars working with endocrinology    The total time for the visit today was 32 minutes which includes patient visit, reviewing of records, discussion, and placing of orders of the outpatient coordination of cardiovascular care as described.  The level of medical decision making during this visit was of moderate complexity.  Thank you for allowing me to participate in their care.  Will have him follow up with Dr Burns in about 3 months    The longitudinal plan of care for the diagnosis(es)/condition(s) as documented were addressed during this visit. Due to the added complexity in care, I will continue to support Kaden in the subsequent management and with ongoing continuity of care.        Orders Placed This Encounter   Procedures    Lipid Profile    ALT    Magnesium    TSH with free T4 reflex    Follow-Up with Cardiology    EKG 12-lead complete w/read - Clinics (performed today)    Echocardiogram Complete       Orders Placed This Encounter   Medications    clopidogrel (PLAVIX) 75 MG tablet     Sig: Take 75 mg by mouth daily.    clopidogrel (PLAVIX) 75 MG tablet     Sig: Take 1 tablet (75 mg) by mouth daily.     Dispense:  90 tablet     Refill:  3     Pt does not need refills at this time       Medications Discontinued During This Encounter   Medication Reason    ticagrelor (BRILINTA) 90 MG tablet          Encounter Diagnoses   Name Primary?    NSTEMI (non-ST elevated myocardial infarction) (H)     Ischemic cardiomyopathy Yes    Type 2 diabetes mellitus with hyperosmolar coma, without long-term current use of insulin (H)     Diabetes mellitus due to underlying condition with hyperglycemia, without long-term current use of insulin (H)        CURRENT MEDICATIONS:  Current Outpatient Medications   Medication Sig Dispense Refill    aspirin (ASA) 81 MG chewable tablet Take 1 tablet (81 mg) by mouth daily      clopidogrel (PLAVIX) 75 MG tablet Take 75 mg by mouth daily.       clopidogrel (PLAVIX) 75 MG tablet Take 1 tablet (75 mg) by mouth daily. 90 tablet 3    glipiZIDE (GLUCOTROL XL) 5 MG 24 hr tablet Take 10 mg by mouth daily (with breakfast)      lisinopril (ZESTRIL) 20 MG tablet Take 1 tablet (20 mg) by mouth daily 30 tablet 0    metFORMIN (GLUCOPHAGE XR) 500 MG 24 hr tablet Take 1,000 mg by mouth 2 times daily (with meals)      metoprolol tartrate (LOPRESSOR) 25 MG tablet Take 1 tablet (25 mg) by mouth 2 times daily 60 tablet 0    nitroGLYcerin (NITROSTAT) 0.4 MG sublingual tablet For chest pain place 1 tablet under the tongue every 5 minutes for 3 doses. If symptoms persist 5 minutes after 1st dose call 911. 15 tablet 0    rosuvastatin (CRESTOR) 20 MG tablet Take 1 tablet (20 mg) by mouth at bedtime 30 tablet 0       ALLERGIES   No Known Allergies    PAST MEDICAL HISTORY:  No past medical history on file.    PAST SURGICAL HISTORY:  Past Surgical History:   Procedure Laterality Date    CV CORONARY ANGIOGRAM N/A 5/25/2024    Procedure: Coronary Angiogram;  Surgeon: Salomon Carrillo MD;  Location: [facility] CARDIAC CATH LAB    CV INTRAVASULAR ULTRASOUND N/A 5/25/2024    Procedure: Intravascular Ultrasound;  Surgeon: Salomon Carrillo MD;  Location: [facility] CARDIAC CATH LAB    CV PCI N/A 5/25/2024    Procedure: Percutaneous Coronary Intervention;  Surgeon: Salomon Carrillo MD;  Location: [facility] CARDIAC CATH LAB       FAMILY HISTORY:  No family history on file.    SOCIAL HISTORY:  Social History     Socioeconomic History    Marital status:      Spouse name: None    Number of children: None    Years of education: None    Highest education level: None   Tobacco Use    Smoking status: Never    Smokeless tobacco: Never   Substance and Sexual Activity    Alcohol use: Not Currently     Comment: not since 1976     Social Determinants of Health      [facility] [employer] [facility]    Financial Resource Strain    Received from [employer] [facility]    Social Connections       Review of Systems:  Skin:  not assessed       Eyes:  not assessed      ENT:  not assessed      Respiratory:  Negative   hasnt had SOB since hospital   Cardiovascular:    Positive for;lightheadedness had lightheadedness when getting up for the first week after getting out of the hospital but not since then  Gastroenterology: not assessed      Genitourinary:  not assessed      Musculoskeletal:  not assessed      Neurologic:  not assessed      Psychiatric:  not assessed      Heme/Lymph/Imm:  not assessed      Endocrine:  not assessed        Physical Exam:  Vitals: /68   Pulse 65   Ht 1.867 m (6' 1.5\")   Wt 90.4 kg (199 lb 4.8 oz)   BMI 25.94 kg/m      Constitutional:  cooperative        Skin:  warm and dry to the touch          Head:  normocephalic        Eyes:  pupils equal and round        Lymph:      ENT:  no pallor or cyanosis        Neck:  JVP normal;no carotid bruit        Respiratory:  clear to auscultation;normal respiratory excursion         Cardiac: regular rhythm;normal S1 and S2 occasional premature beats   no presence of murmur          pulses full and equal                                        GI:  abdomen soft        Extremities and Muscular Skeletal:  no edema              Neurological:  affect appropriate        Psych:  Alert and Oriented x 3          CC  aMriia Castillo MD  [id number] [id number]                    "

## 2024-08-28 ENCOUNTER — LAB (OUTPATIENT)
Dept: LAB | Facility: CLINIC | Age: 74
End: 2024-08-28
Payer: MEDICARE

## 2024-08-28 ENCOUNTER — OFFICE VISIT (OUTPATIENT)
Dept: CARDIOLOGY | Facility: CLINIC | Age: 74
End: 2024-08-28
Payer: MEDICARE

## 2024-08-28 VITALS
WEIGHT: 199.3 LBS | DIASTOLIC BLOOD PRESSURE: 68 MMHG | SYSTOLIC BLOOD PRESSURE: 128 MMHG | HEIGHT: 74 IN | HEART RATE: 65 BPM | BODY MASS INDEX: 25.58 KG/M2

## 2024-08-28 DIAGNOSIS — I21.4 NSTEMI (NON-ST ELEVATED MYOCARDIAL INFARCTION) (H): ICD-10-CM

## 2024-08-28 DIAGNOSIS — I25.5 ISCHEMIC CARDIOMYOPATHY: ICD-10-CM

## 2024-08-28 DIAGNOSIS — E08.65 DIABETES MELLITUS DUE TO UNDERLYING CONDITION WITH HYPERGLYCEMIA, WITHOUT LONG-TERM CURRENT USE OF INSULIN (H): ICD-10-CM

## 2024-08-28 DIAGNOSIS — E11.01 TYPE 2 DIABETES MELLITUS WITH HYPEROSMOLAR COMA, WITHOUT LONG-TERM CURRENT USE OF INSULIN (H): ICD-10-CM

## 2024-08-28 DIAGNOSIS — I25.5 ISCHEMIC CARDIOMYOPATHY: Primary | ICD-10-CM

## 2024-08-28 PROBLEM — E11.9 DIABETES MELLITUS, TYPE 2 (H): Status: ACTIVE | Noted: 2024-08-28

## 2024-08-28 LAB
ANION GAP SERPL CALCULATED.3IONS-SCNC: 12 MMOL/L (ref 7–15)
BUN SERPL-MCNC: 16.4 MG/DL (ref 8–23)
CALCIUM SERPL-MCNC: 9.4 MG/DL (ref 8.8–10.4)
CHLORIDE SERPL-SCNC: 104 MMOL/L (ref 98–107)
CREAT SERPL-MCNC: 1 MG/DL (ref 0.67–1.17)
EGFRCR SERPLBLD CKD-EPI 2021: 79 ML/MIN/1.73M2
ERYTHROCYTE [DISTWIDTH] IN BLOOD BY AUTOMATED COUNT: 12.3 % (ref 10–15)
GLUCOSE SERPL-MCNC: 169 MG/DL (ref 70–99)
HCO3 SERPL-SCNC: 23 MMOL/L (ref 22–29)
HCT VFR BLD AUTO: 38 % (ref 40–53)
HGB BLD-MCNC: 12.7 G/DL (ref 13.3–17.7)
MAGNESIUM SERPL-MCNC: 2.1 MG/DL (ref 1.7–2.3)
MCH RBC QN AUTO: 30.6 PG (ref 26.5–33)
MCHC RBC AUTO-ENTMCNC: 33.4 G/DL (ref 31.5–36.5)
MCV RBC AUTO: 92 FL (ref 78–100)
PLATELET # BLD AUTO: 260 10E3/UL (ref 150–450)
POTASSIUM SERPL-SCNC: 4.6 MMOL/L (ref 3.4–5.3)
RBC # BLD AUTO: 4.15 10E6/UL (ref 4.4–5.9)
SODIUM SERPL-SCNC: 139 MMOL/L (ref 135–145)
TSH SERPL DL<=0.005 MIU/L-ACNC: 1.24 UIU/ML (ref 0.3–4.2)
WBC # BLD AUTO: 7.4 10E3/UL (ref 4–11)

## 2024-08-28 PROCEDURE — 99214 OFFICE O/P EST MOD 30 MIN: CPT | Performed by: NURSE PRACTITIONER

## 2024-08-28 PROCEDURE — 84443 ASSAY THYROID STIM HORMONE: CPT | Mod: GZ

## 2024-08-28 PROCEDURE — 36415 COLL VENOUS BLD VENIPUNCTURE: CPT

## 2024-08-28 PROCEDURE — 85027 COMPLETE CBC AUTOMATED: CPT

## 2024-08-28 PROCEDURE — 83735 ASSAY OF MAGNESIUM: CPT

## 2024-08-28 PROCEDURE — 80048 BASIC METABOLIC PNL TOTAL CA: CPT

## 2024-08-28 PROCEDURE — 93000 ELECTROCARDIOGRAM COMPLETE: CPT | Performed by: NURSE PRACTITIONER

## 2024-08-28 RX ORDER — CLOPIDOGREL BISULFATE 75 MG/1
75 TABLET ORAL DAILY
Qty: 90 TABLET | Refills: 3 | Status: SHIPPED | OUTPATIENT
Start: 2024-08-28

## 2024-08-28 RX ORDER — CLOPIDOGREL BISULFATE 75 MG/1
75 TABLET ORAL DAILY
COMMUNITY

## 2024-08-28 NOTE — LETTER
8/28/2024    [facility]  [address] [address]    RE: Kaden Millarduer       Dear Colleague,     I had the pleasure of seeing Kaden Cain in the [facility].  HISTORY OF PRESENT ILLNESS:    This is a 74 year old male who follows with Dr Burns at [facility]  His past medical history includes:  Coronary artery disease, hypertension, type II diabetes, hyperlipidemia and mild ischemic cardiomyopathy    Mr Cain was hospitalized for NSTEMI (5/25/24)  His troponin peaked at 771  ECHO showed LVEF 50-55% with anterolateral hypokinesis, normal RV function, no significant valvular pathology  He was found to have significant  LAD and CFX disease and underwent stenting to his proximal LAD and stenting x2 to his CFX via IVUS  He completed cardiac rehab and made significant gains    Our visit today is for further review and labs      Mr Cain comes in with his wife today  Overall, he has recovered well and is back to his usual activities  He denies any chest pain, shortness of breath, palpitations, orthopnea or peripheral edema  He does not feel any skipped beats   He states that his primary provider refilled his medications and changed his Brilinta to Plavix due to cost purposes    He checks his blood pressure at home and states that his BP stays < 140/90 mmHg at home        VITAL SIGNS:  BP:  146/68, 128/68/ at home  Pulse:  65  Weight:  199 lbs  (BMI: 25)    Labs today: Hgb 12.7  Sodium: 139  Potassium: 4.6  BUN: 16  Creatinine: 1.0  Glucose 169    EKG :  Sinus rhythm with frequent PACs  HR 65 bpm    IMPRESSION AND PLAN:    Coronary Artery Disease:  -s/p NSTEMI and stenting to proximal LAD and stenting x 2 to prox-mid CFX (5/25/24)  -denies angina  -Plavix for at least 1 yr, ASA 81 mg indefinitely    Premature Atrial Contractions  -asymptomatic  -will check Mag, TSH    Ischemic Cardiomyopathy  -LVEF 50-55% with anterolateral hypokinesis  -no signs or symptoms of  heart failure  -weight stable  -repeat ECHO in 1 month    Hypertension:  -on Lisinopril 20 mg, Metoprolol 25 mg BID  -BP controlled    Hyperlipidemia:  -on Crestor 20 mg  -will arrange fasting lipids in near future    Type II Diabetes:  -improving blood sugars working with endocrinology    The total time for the visit today was 32 minutes which includes patient visit, reviewing of records, discussion, and placing of orders of the outpatient coordination of cardiovascular care as described.  The level of medical decision making during this visit was of moderate complexity.  Thank you for allowing me to participate in their care.  Will have him follow up with Dr Burns in about 3 months    The longitudinal plan of care for the diagnosis(es)/condition(s) as documented were addressed during this visit. Due to the added complexity in care, I will continue to support Kaden in the subsequent management and with ongoing continuity of care.        Orders Placed This Encounter   Procedures     Lipid Profile     ALT     Magnesium     TSH with free T4 reflex     Follow-Up with Cardiology     EKG 12-lead complete w/read - Clinics (performed today)     Echocardiogram Complete       Orders Placed This Encounter   Medications     clopidogrel (PLAVIX) 75 MG tablet     Sig: Take 75 mg by mouth daily.     clopidogrel (PLAVIX) 75 MG tablet     Sig: Take 1 tablet (75 mg) by mouth daily.     Dispense:  90 tablet     Refill:  3     Pt does not need refills at this time       Medications Discontinued During This Encounter   Medication Reason     ticagrelor (BRILINTA) 90 MG tablet          Encounter Diagnoses   Name Primary?     NSTEMI (non-ST elevated myocardial infarction) (H)      Ischemic cardiomyopathy Yes     Type 2 diabetes mellitus with hyperosmolar coma, without long-term current use of insulin (H)      Diabetes mellitus due to underlying condition with hyperglycemia, without long-term current use of insulin (H)        CURRENT  MEDICATIONS:  Current Outpatient Medications   Medication Sig Dispense Refill     aspirin (ASA) 81 MG chewable tablet Take 1 tablet (81 mg) by mouth daily       clopidogrel (PLAVIX) 75 MG tablet Take 75 mg by mouth daily.       clopidogrel (PLAVIX) 75 MG tablet Take 1 tablet (75 mg) by mouth daily. 90 tablet 3     glipiZIDE (GLUCOTROL XL) 5 MG 24 hr tablet Take 10 mg by mouth daily (with breakfast)       lisinopril (ZESTRIL) 20 MG tablet Take 1 tablet (20 mg) by mouth daily 30 tablet 0     metFORMIN (GLUCOPHAGE XR) 500 MG 24 hr tablet Take 1,000 mg by mouth 2 times daily (with meals)       metoprolol tartrate (LOPRESSOR) 25 MG tablet Take 1 tablet (25 mg) by mouth 2 times daily 60 tablet 0     nitroGLYcerin (NITROSTAT) 0.4 MG sublingual tablet For chest pain place 1 tablet under the tongue every 5 minutes for 3 doses. If symptoms persist 5 minutes after 1st dose call 911. 15 tablet 0     rosuvastatin (CRESTOR) 20 MG tablet Take 1 tablet (20 mg) by mouth at bedtime 30 tablet 0       ALLERGIES   No Known Allergies    PAST MEDICAL HISTORY:  No past medical history on file.    PAST SURGICAL HISTORY:  Past Surgical History:   Procedure Laterality Date     CV CORONARY ANGIOGRAM N/A 5/25/2024    Procedure: Coronary Angiogram;  Surgeon: Salomon Carrillo MD;  Location: [facility] CARDIAC CATH LAB     CV INTRAVASULAR ULTRASOUND N/A 5/25/2024    Procedure: Intravascular Ultrasound;  Surgeon: Salomon Carrillo MD;  Location: [facility] CARDIAC CATH LAB     CV PCI N/A 5/25/2024    Procedure: Percutaneous Coronary Intervention;  Surgeon: Salomon Carrillo MD;  Location: [facility] CARDIAC CATH LAB       FAMILY HISTORY:  No family history on file.    SOCIAL HISTORY:  Social History     Socioeconomic History     Marital status:      Spouse name: None     Number of children: None     Years of education: None     Highest education level: None   Tobacco Use     Smoking status: Never     Smokeless tobacco: Never   Substance and  "Sexual Activity     Alcohol use: Not Currently     Comment: not since 1976     Social Determinants of Health      Received from [facility]    Financial Resource Strain    Received from [facility] [employer]    Social Connections       Review of Systems:  Skin:  not assessed       Eyes:  not assessed      ENT:  not assessed      Respiratory:  Negative   hasnt had SOB since hospital   Cardiovascular:    Positive for;lightheadedness had lightheadedness when getting up for the first week after getting out of the hospital but not since then  Gastroenterology: not assessed      Genitourinary:  not assessed      Musculoskeletal:  not assessed      Neurologic:  not assessed      Psychiatric:  not assessed      Heme/Lymph/Imm:  not assessed      Endocrine:  not assessed        Physical Exam:  Vitals: /68   Pulse 65   Ht 1.867 m (6' 1.5\")   Wt 90.4 kg (199 lb 4.8 oz)   BMI 25.94 kg/m      Constitutional:  cooperative        Skin:  warm and dry to the touch          Head:  normocephalic        Eyes:  pupils equal and round        Lymph:      ENT:  no pallor or cyanosis        Neck:  JVP normal;no carotid bruit        Respiratory:  clear to auscultation;normal respiratory excursion         Cardiac: regular rhythm;normal S1 and S2 occasional premature beats   no presence of murmur          pulses full and equal                                        GI:  abdomen soft        Extremities and Muscular Skeletal:  no edema              Neurological:  affect appropriate        Psych:  Alert and Oriented x 3          CC  Mariia Castillo MD  [id number] [id number]                      Thank you for allowing me to participate in the care of your patient.      Sincerely,     KATE Hines [facility]  cc:   Mariia Castillo MD  [id number] [id number]      "

## 2024-08-28 NOTE — PATIENT INSTRUCTIONS
Arrange fasting labs and ECHO  Call with BP > 140/90 mmHg  Return in 3 months    It was a pleasure seeing you today     Please do not hesitate to call my nurse team with any questions or concerns:  [phone]    Scheduling number:  [phone]    KATE Jensen, CNP

## 2024-09-09 ENCOUNTER — LAB (OUTPATIENT)
Dept: LAB | Facility: CLINIC | Age: 74
End: 2024-09-09
Payer: MEDICARE

## 2024-09-09 DIAGNOSIS — I21.4 NSTEMI (NON-ST ELEVATED MYOCARDIAL INFARCTION) (H): ICD-10-CM

## 2024-09-09 LAB
ALT SERPL W P-5'-P-CCNC: 20 U/L (ref 0–70)
CHOLEST SERPL-MCNC: 98 MG/DL
FASTING STATUS PATIENT QL REPORTED: YES
HDLC SERPL-MCNC: 42 MG/DL
LDLC SERPL CALC-MCNC: 37 MG/DL
NONHDLC SERPL-MCNC: 56 MG/DL
TRIGL SERPL-MCNC: 95 MG/DL

## 2024-09-09 PROCEDURE — 80061 LIPID PANEL: CPT | Performed by: NURSE PRACTITIONER

## 2024-09-09 PROCEDURE — 84460 ALANINE AMINO (ALT) (SGPT): CPT | Performed by: NURSE PRACTITIONER

## 2024-09-09 PROCEDURE — 36415 COLL VENOUS BLD VENIPUNCTURE: CPT | Performed by: NURSE PRACTITIONER

## 2024-09-09 NOTE — RESULT ENCOUNTER NOTE
"Per June, \"LDL at goal  Normal ALT  continue current medical regimen.\"    Updated patient with results via [employer]. "

## 2024-09-14 ENCOUNTER — HOSPITAL ENCOUNTER (OUTPATIENT)
Dept: CARDIOLOGY | Facility: CLINIC | Age: 74
Discharge: HOME | End: 2024-09-14
Attending: NURSE PRACTITIONER | Admitting: NURSE PRACTITIONER
Payer: MEDICARE

## 2024-09-14 DIAGNOSIS — I25.5 ISCHEMIC CARDIOMYOPATHY: ICD-10-CM

## 2024-09-14 LAB — LVEF ECHO: NORMAL

## 2024-09-14 PROCEDURE — 93306 TTE W/DOPPLER COMPLETE: CPT | Mod: 26 | Performed by: INTERNAL MEDICINE

## 2024-09-14 PROCEDURE — 93306 TTE W/DOPPLER COMPLETE: CPT

## 2024-09-15 ENCOUNTER — TELEPHONE (OUTPATIENT)
Dept: CARDIOLOGY | Facility: CLINIC | Age: 74
End: 2024-09-15
Payer: MEDICARE

## 2024-09-15 DIAGNOSIS — I49.3 PVC'S (PREMATURE VENTRICULAR CONTRACTIONS): Primary | ICD-10-CM

## 2024-09-16 ENCOUNTER — ORDERS ONLY (AUTO-RELEASED) (OUTPATIENT)
Dept: CARDIOLOGY | Facility: CLINIC | Age: 74
End: 2024-09-16
Payer: MEDICARE

## 2024-09-16 DIAGNOSIS — I49.3 PVC'S (PREMATURE VENTRICULAR CONTRACTIONS): ICD-10-CM

## 2024-09-16 NOTE — TELEPHONE ENCOUNTER
Left detailed message to notify patient that a heart monitor will be mailed out to his house and he will wear it for 7 days an mail back.  Reason: saw PVC's on echo.  Tameka Haines RN on 9/16/2024 at 8:48 AM            Echo reviewed  Similar LVEF 50-55% with global hypokinesis  Frequent PVCs noted on beta-blocker  Recommend ziopatch monitor for quantification  Order placed  Pls let him know about the mailout Kary

## 2024-10-06 ENCOUNTER — HEALTH MAINTENANCE LETTER (OUTPATIENT)
Age: 74
End: 2024-10-06

## 2024-12-09 ENCOUNTER — OFFICE VISIT (OUTPATIENT)
Dept: CARDIOLOGY | Facility: CLINIC | Age: 74
End: 2024-12-09
Attending: NURSE PRACTITIONER
Payer: MEDICARE

## 2024-12-09 VITALS
DIASTOLIC BLOOD PRESSURE: 64 MMHG | WEIGHT: 202 LBS | HEART RATE: 62 BPM | SYSTOLIC BLOOD PRESSURE: 148 MMHG | HEIGHT: 74 IN | BODY MASS INDEX: 25.93 KG/M2

## 2024-12-09 DIAGNOSIS — I21.4 NSTEMI (NON-ST ELEVATED MYOCARDIAL INFARCTION) (H): ICD-10-CM

## 2024-12-09 PROCEDURE — 99214 OFFICE O/P EST MOD 30 MIN: CPT | Performed by: INTERNAL MEDICINE

## 2024-12-09 RX ORDER — LISINOPRIL 40 MG/1
40 TABLET ORAL DAILY
Qty: 90 TABLET | Refills: 3 | Status: SHIPPED | OUTPATIENT
Start: 2024-12-09

## 2024-12-09 NOTE — PROGRESS NOTES
CARDIOLOGY CLINIC CONSULTATION    PRIMARY CARE PHYSICIAN:  [facility]    HISTORY OF PRESENT ILLNESS:  This is a very pleasant 74-year-old gentleman who was seen by me in inpatient consultation in May 2024 when he presented with non-ST elevation myocardial infarction noted to have an EF of 50 to 55% and lateral wall motion abnormality.  Coronary angiography at that time showed disease in the LAD and circumflex and both those vessels were stented with a total of 4 drug-eluting stents.  He has been put on dual antiplatelet therapy statin.  His LDL is responded nicely.  Last LDL was 37.  EF is 50 to 55%.    Patient is here for routine cardiology follow-up.  Denies any new cardiovascular symptoms.  No angina or heart failure.  His blood pressure however has been running a little on the higher side in the 140s.  Currently he takes 20 mg daily of lisinopril.  He is on metoprolol tartrate 25 twice daily.    PAST MEDICAL HISTORY:  No past medical history on file.    MEDICATIONS:  Current Outpatient Medications   Medication Sig Dispense Refill    aspirin (ASA) 81 MG chewable tablet Take 1 tablet (81 mg) by mouth daily      clopidogrel (PLAVIX) 75 MG tablet Take 75 mg by mouth daily.      clopidogrel (PLAVIX) 75 MG tablet Take 1 tablet (75 mg) by mouth daily. 90 tablet 3    glipiZIDE (GLUCOTROL XL) 5 MG 24 hr tablet Take 10 mg by mouth daily (with breakfast)      lisinopril (ZESTRIL) 20 MG tablet Take 1 tablet (20 mg) by mouth daily 30 tablet 0    metFORMIN (GLUCOPHAGE XR) 500 MG 24 hr tablet Take 1,000 mg by mouth 2 times daily (with meals)      metoprolol tartrate (LOPRESSOR) 25 MG tablet Take 1 tablet (25 mg) by mouth 2 times daily 60 tablet 0    nitroGLYcerin (NITROSTAT) 0.4 MG sublingual tablet For chest pain place 1 tablet under the tongue every 5 minutes for 3 doses. If symptoms persist 5 minutes after 1st dose call 911. 15 tablet 0    rosuvastatin (CRESTOR) 20 MG tablet Take 1 tablet (20 mg) by mouth at  bedtime 30 tablet 0     No current facility-administered medications for this visit.       SOCIAL HISTORY:  I have reviewed this patient's social history and updated it with pertinent information if needed. Kaden Cain  reports that he has never smoked. He has never used smokeless tobacco. He reports that he does not currently use alcohol.    PHYSICAL EXAM:  Pulse:  [62] 62  BP: (148)/(64) 148/64  202 lbs 0 oz    Constitutional: alert, no distress  Respiratory: Good bilateral air entry  Cardiovascular: Normal regular heart sounds  GI: nondistended  Neuropsychiatric: appropriate affact    ASSESSMENT: Pertinent issues addressed/ reviewed during this cardiology visit  Coronary artery disease status post LAD circumflex tenting  Uncontrolled hypertension    RECOMMENDATIONS:  In regards to CAD, pathophysiology of ACS explained to the patient.  Continue aspirin and statin therapy for life.  He will stop his clopidogrel in end of May 2025 which would be 1 year after his PCI.  In the interim however if anything changes, this issue will have to be revisited.  I have increased his lisinopril to 40 mg daily given his uncontrolled blood pressure.  He will follow-up with his PCP.  Routine cardiology follow-up yearly.    It was a pleasure seeing this patient in clinic today. Please do not hesitate to contact me with any future questions.     BHARGAVI Larsen, [facility]  Cardiology - [facility]  December 9, 2024    Review of the result(s) of each unique test - Last CBC BMP lipids echocardiogram     The level of medical decision making during this visit was of moderate complexity.    This note was completed in part using dictation via the Dragon voice recognition software. Some word and grammatical errors may occur and must be interpreted in the appropriate clinical context.  If there are any questions pertaining to this issue, please contact me for further clarification.

## 2024-12-15 ENCOUNTER — HEALTH MAINTENANCE LETTER (OUTPATIENT)
Age: 74
End: 2024-12-15

## 2025-01-19 ENCOUNTER — HEALTH MAINTENANCE LETTER (OUTPATIENT)
Age: 75
End: 2025-01-19

## 2025-03-26 ENCOUNTER — APPOINTMENT (OUTPATIENT)
Dept: MRI IMAGING | Facility: CLINIC | Age: 75
DRG: 623 | End: 2025-03-26
Attending: PODIATRIST
Payer: MEDICARE

## 2025-03-26 ENCOUNTER — APPOINTMENT (OUTPATIENT)
Dept: GENERAL RADIOLOGY | Facility: CLINIC | Age: 75
DRG: 623 | End: 2025-03-26
Attending: STUDENT IN AN ORGANIZED HEALTH CARE EDUCATION/TRAINING PROGRAM
Payer: MEDICARE

## 2025-03-26 ENCOUNTER — HOSPITAL ENCOUNTER (INPATIENT)
Facility: CLINIC | Age: 75
DRG: 623 | End: 2025-03-26
Attending: STUDENT IN AN ORGANIZED HEALTH CARE EDUCATION/TRAINING PROGRAM | Admitting: INTERNAL MEDICINE
Payer: MEDICARE

## 2025-03-26 DIAGNOSIS — Z98.890 POST-OPERATIVE STATE: ICD-10-CM

## 2025-03-26 DIAGNOSIS — E11.42 TYPE 2 DIABETES MELLITUS WITH DIABETIC POLYNEUROPATHY, WITHOUT LONG-TERM CURRENT USE OF INSULIN (H): ICD-10-CM

## 2025-03-26 DIAGNOSIS — L08.9 LEFT FOOT INFECTION: Primary | ICD-10-CM

## 2025-03-26 DIAGNOSIS — L08.9 DIABETIC FOOT INFECTION (H): ICD-10-CM

## 2025-03-26 DIAGNOSIS — E11.628 DIABETIC FOOT INFECTION (H): ICD-10-CM

## 2025-03-26 LAB
ALBUMIN SERPL BCG-MCNC: 4.7 G/DL (ref 3.5–5.2)
ALP SERPL-CCNC: 110 U/L (ref 40–150)
ALT SERPL W P-5'-P-CCNC: 31 U/L (ref 0–70)
ANION GAP SERPL CALCULATED.3IONS-SCNC: 15 MMOL/L (ref 7–15)
AST SERPL W P-5'-P-CCNC: 31 U/L (ref 0–45)
BASOPHILS # BLD AUTO: 0 10E3/UL (ref 0–0.2)
BASOPHILS NFR BLD AUTO: 0 %
BILIRUB SERPL-MCNC: 1 MG/DL
BUN SERPL-MCNC: 13.9 MG/DL (ref 8–23)
CALCIUM SERPL-MCNC: 9.8 MG/DL (ref 8.8–10.4)
CHLORIDE SERPL-SCNC: 92 MMOL/L (ref 98–107)
CREAT SERPL-MCNC: 1.16 MG/DL (ref 0.67–1.17)
EGFRCR SERPLBLD CKD-EPI 2021: 66 ML/MIN/1.73M2
EOSINOPHIL # BLD AUTO: 0 10E3/UL (ref 0–0.7)
EOSINOPHIL NFR BLD AUTO: 0 %
ERYTHROCYTE [DISTWIDTH] IN BLOOD BY AUTOMATED COUNT: 11.6 % (ref 10–15)
EST. AVERAGE GLUCOSE BLD GHB EST-MCNC: 194 MG/DL
GLUCOSE BLDC GLUCOMTR-MCNC: 267 MG/DL (ref 70–99)
GLUCOSE BLDC GLUCOMTR-MCNC: 349 MG/DL (ref 70–99)
GLUCOSE BLDC GLUCOMTR-MCNC: 349 MG/DL (ref 70–99)
GLUCOSE SERPL-MCNC: 354 MG/DL (ref 70–99)
HBA1C MFR BLD: 8.4 %
HCO3 SERPL-SCNC: 23 MMOL/L (ref 22–29)
HCT VFR BLD AUTO: 40.5 % (ref 40–53)
HGB BLD-MCNC: 14.3 G/DL (ref 13.3–17.7)
HOLD SPECIMEN: NORMAL
HOLD SPECIMEN: NORMAL
IMM GRANULOCYTES # BLD: 0.1 10E3/UL
IMM GRANULOCYTES NFR BLD: 1 %
LACTATE SERPL-SCNC: 1.9 MMOL/L (ref 0.7–2)
LYMPHOCYTES # BLD AUTO: 1.2 10E3/UL (ref 0.8–5.3)
LYMPHOCYTES NFR BLD AUTO: 8 %
MCH RBC QN AUTO: 31 PG (ref 26.5–33)
MCHC RBC AUTO-ENTMCNC: 35.3 G/DL (ref 31.5–36.5)
MCV RBC AUTO: 88 FL (ref 78–100)
MONOCYTES # BLD AUTO: 1.3 10E3/UL (ref 0–1.3)
MONOCYTES NFR BLD AUTO: 9 %
NEUTROPHILS # BLD AUTO: 12.3 10E3/UL (ref 1.6–8.3)
NEUTROPHILS NFR BLD AUTO: 82 %
NRBC # BLD AUTO: 0 10E3/UL
NRBC BLD AUTO-RTO: 0 /100
PLATELET # BLD AUTO: 252 10E3/UL (ref 150–450)
POTASSIUM SERPL-SCNC: 4.6 MMOL/L (ref 3.4–5.3)
PROT SERPL-MCNC: 8.7 G/DL (ref 6.4–8.3)
RBC # BLD AUTO: 4.61 10E6/UL (ref 4.4–5.9)
SODIUM SERPL-SCNC: 130 MMOL/L (ref 135–145)
WBC # BLD AUTO: 15 10E3/UL (ref 4–11)

## 2025-03-26 PROCEDURE — 85004 AUTOMATED DIFF WBC COUNT: CPT | Performed by: STUDENT IN AN ORGANIZED HEALTH CARE EDUCATION/TRAINING PROGRAM

## 2025-03-26 PROCEDURE — 82435 ASSAY OF BLOOD CHLORIDE: CPT | Performed by: STUDENT IN AN ORGANIZED HEALTH CARE EDUCATION/TRAINING PROGRAM

## 2025-03-26 PROCEDURE — 73718 MRI LOWER EXTREMITY W/O DYE: CPT | Mod: LT

## 2025-03-26 PROCEDURE — 250N000013 HC RX MED GY IP 250 OP 250 PS 637: Performed by: INTERNAL MEDICINE

## 2025-03-26 PROCEDURE — 73630 X-RAY EXAM OF FOOT: CPT | Mod: LT

## 2025-03-26 PROCEDURE — 96365 THER/PROPH/DIAG IV INF INIT: CPT

## 2025-03-26 PROCEDURE — 82247 BILIRUBIN TOTAL: CPT | Performed by: STUDENT IN AN ORGANIZED HEALTH CARE EDUCATION/TRAINING PROGRAM

## 2025-03-26 PROCEDURE — 99285 EMERGENCY DEPT VISIT HI MDM: CPT | Mod: 25

## 2025-03-26 PROCEDURE — 84155 ASSAY OF PROTEIN SERUM: CPT | Performed by: STUDENT IN AN ORGANIZED HEALTH CARE EDUCATION/TRAINING PROGRAM

## 2025-03-26 PROCEDURE — 250N000011 HC RX IP 250 OP 636: Performed by: STUDENT IN AN ORGANIZED HEALTH CARE EDUCATION/TRAINING PROGRAM

## 2025-03-26 PROCEDURE — 83036 HEMOGLOBIN GLYCOSYLATED A1C: CPT | Performed by: INTERNAL MEDICINE

## 2025-03-26 PROCEDURE — 120N000001 HC R&B MED SURG/OB

## 2025-03-26 PROCEDURE — 250N000012 HC RX MED GY IP 250 OP 636 PS 637: Performed by: INTERNAL MEDICINE

## 2025-03-26 PROCEDURE — 250N000011 HC RX IP 250 OP 636: Performed by: INTERNAL MEDICINE

## 2025-03-26 PROCEDURE — 83605 ASSAY OF LACTIC ACID: CPT | Performed by: STUDENT IN AN ORGANIZED HEALTH CARE EDUCATION/TRAINING PROGRAM

## 2025-03-26 PROCEDURE — 85014 HEMATOCRIT: CPT | Performed by: STUDENT IN AN ORGANIZED HEALTH CARE EDUCATION/TRAINING PROGRAM

## 2025-03-26 PROCEDURE — 82962 GLUCOSE BLOOD TEST: CPT

## 2025-03-26 PROCEDURE — 99497 ADVNCD CARE PLAN 30 MIN: CPT | Performed by: INTERNAL MEDICINE

## 2025-03-26 PROCEDURE — 99222 1ST HOSP IP/OBS MODERATE 55: CPT | Mod: AI | Performed by: INTERNAL MEDICINE

## 2025-03-26 PROCEDURE — 87040 BLOOD CULTURE FOR BACTERIA: CPT | Performed by: STUDENT IN AN ORGANIZED HEALTH CARE EDUCATION/TRAINING PROGRAM

## 2025-03-26 PROCEDURE — 258N000003 HC RX IP 258 OP 636: Performed by: STUDENT IN AN ORGANIZED HEALTH CARE EDUCATION/TRAINING PROGRAM

## 2025-03-26 PROCEDURE — 36415 COLL VENOUS BLD VENIPUNCTURE: CPT | Performed by: STUDENT IN AN ORGANIZED HEALTH CARE EDUCATION/TRAINING PROGRAM

## 2025-03-26 RX ORDER — NICOTINE POLACRILEX 4 MG
15-30 LOZENGE BUCCAL
Status: DISCONTINUED | OUTPATIENT
Start: 2025-03-26 | End: 2025-03-29 | Stop reason: HOSPADM

## 2025-03-26 RX ORDER — ONDANSETRON 2 MG/ML
4 INJECTION INTRAMUSCULAR; INTRAVENOUS EVERY 6 HOURS PRN
Status: DISCONTINUED | OUTPATIENT
Start: 2025-03-26 | End: 2025-03-29 | Stop reason: HOSPADM

## 2025-03-26 RX ORDER — NITROGLYCERIN 0.4 MG/1
0.4 TABLET SUBLINGUAL EVERY 5 MIN PRN
Status: DISCONTINUED | OUTPATIENT
Start: 2025-03-26 | End: 2025-03-29 | Stop reason: HOSPADM

## 2025-03-26 RX ORDER — ASPIRIN 81 MG/1
81 TABLET ORAL DAILY
COMMUNITY

## 2025-03-26 RX ORDER — POLYETHYLENE GLYCOL 3350 17 G/17G
17 POWDER, FOR SOLUTION ORAL 2 TIMES DAILY PRN
Status: DISCONTINUED | OUTPATIENT
Start: 2025-03-26 | End: 2025-03-29 | Stop reason: HOSPADM

## 2025-03-26 RX ORDER — CLOPIDOGREL BISULFATE 75 MG/1
75 TABLET ORAL DAILY
Status: DISCONTINUED | OUTPATIENT
Start: 2025-03-27 | End: 2025-03-29 | Stop reason: HOSPADM

## 2025-03-26 RX ORDER — AMOXICILLIN 250 MG
1 CAPSULE ORAL 2 TIMES DAILY PRN
Status: DISCONTINUED | OUTPATIENT
Start: 2025-03-26 | End: 2025-03-29 | Stop reason: HOSPADM

## 2025-03-26 RX ORDER — ACETAMINOPHEN 650 MG/1
650 SUPPOSITORY RECTAL EVERY 4 HOURS PRN
Status: DISCONTINUED | OUTPATIENT
Start: 2025-03-26 | End: 2025-03-29 | Stop reason: HOSPADM

## 2025-03-26 RX ORDER — ROSUVASTATIN CALCIUM 20 MG/1
20 TABLET, COATED ORAL AT BEDTIME
Status: DISCONTINUED | OUTPATIENT
Start: 2025-03-26 | End: 2025-03-29 | Stop reason: HOSPADM

## 2025-03-26 RX ORDER — OXYCODONE HYDROCHLORIDE 5 MG/1
5 TABLET ORAL EVERY 4 HOURS PRN
Status: DISCONTINUED | OUTPATIENT
Start: 2025-03-26 | End: 2025-03-29 | Stop reason: HOSPADM

## 2025-03-26 RX ORDER — DEXTROSE MONOHYDRATE 25 G/50ML
25-50 INJECTION, SOLUTION INTRAVENOUS
Status: DISCONTINUED | OUTPATIENT
Start: 2025-03-26 | End: 2025-03-29 | Stop reason: HOSPADM

## 2025-03-26 RX ORDER — LISINOPRIL 40 MG/1
40 TABLET ORAL DAILY
Status: DISCONTINUED | OUTPATIENT
Start: 2025-03-27 | End: 2025-03-29 | Stop reason: HOSPADM

## 2025-03-26 RX ORDER — ACETAMINOPHEN 325 MG/1
650 TABLET ORAL EVERY 4 HOURS PRN
Status: DISCONTINUED | OUTPATIENT
Start: 2025-03-26 | End: 2025-03-29 | Stop reason: HOSPADM

## 2025-03-26 RX ORDER — AMOXICILLIN 250 MG
2 CAPSULE ORAL 2 TIMES DAILY PRN
Status: DISCONTINUED | OUTPATIENT
Start: 2025-03-26 | End: 2025-03-29 | Stop reason: HOSPADM

## 2025-03-26 RX ORDER — GABAPENTIN 100 MG/1
100 CAPSULE ORAL 3 TIMES DAILY
Status: DISCONTINUED | OUTPATIENT
Start: 2025-03-26 | End: 2025-03-29 | Stop reason: HOSPADM

## 2025-03-26 RX ORDER — ONDANSETRON 4 MG/1
4 TABLET, ORALLY DISINTEGRATING ORAL EVERY 6 HOURS PRN
Status: DISCONTINUED | OUTPATIENT
Start: 2025-03-26 | End: 2025-03-29 | Stop reason: HOSPADM

## 2025-03-26 RX ORDER — PIPERACILLIN SODIUM, TAZOBACTAM SODIUM 3; .375 G/15ML; G/15ML
3.38 INJECTION, POWDER, LYOPHILIZED, FOR SOLUTION INTRAVENOUS EVERY 6 HOURS
Status: DISCONTINUED | OUTPATIENT
Start: 2025-03-26 | End: 2025-03-27

## 2025-03-26 RX ORDER — LIDOCAINE 40 MG/G
CREAM TOPICAL
Status: DISCONTINUED | OUTPATIENT
Start: 2025-03-26 | End: 2025-03-29 | Stop reason: HOSPADM

## 2025-03-26 RX ORDER — ASPIRIN 81 MG/1
81 TABLET, CHEWABLE ORAL DAILY
Status: DISCONTINUED | OUTPATIENT
Start: 2025-03-27 | End: 2025-03-29 | Stop reason: HOSPADM

## 2025-03-26 RX ORDER — METOPROLOL TARTRATE 25 MG/1
25 TABLET, FILM COATED ORAL 2 TIMES DAILY
Status: DISCONTINUED | OUTPATIENT
Start: 2025-03-26 | End: 2025-03-29 | Stop reason: HOSPADM

## 2025-03-26 RX ORDER — PIPERACILLIN SODIUM, TAZOBACTAM SODIUM 3; .375 G/15ML; G/15ML
3.38 INJECTION, POWDER, LYOPHILIZED, FOR SOLUTION INTRAVENOUS ONCE
Status: COMPLETED | OUTPATIENT
Start: 2025-03-26 | End: 2025-03-26

## 2025-03-26 RX ORDER — CALCIUM CARBONATE 500 MG/1
1000 TABLET, CHEWABLE ORAL 4 TIMES DAILY PRN
Status: DISCONTINUED | OUTPATIENT
Start: 2025-03-26 | End: 2025-03-29 | Stop reason: HOSPADM

## 2025-03-26 RX ADMIN — PIPERACILLIN AND TAZOBACTAM 3.38 G: 3; .375 INJECTION, POWDER, FOR SOLUTION INTRAVENOUS at 20:30

## 2025-03-26 RX ADMIN — GABAPENTIN 100 MG: 100 CAPSULE ORAL at 20:30

## 2025-03-26 RX ADMIN — METOPROLOL TARTRATE 25 MG: 25 TABLET, FILM COATED ORAL at 20:30

## 2025-03-26 RX ADMIN — VANCOMYCIN HYDROCHLORIDE 1750 MG: 10 INJECTION, POWDER, LYOPHILIZED, FOR SOLUTION INTRAVENOUS at 17:14

## 2025-03-26 RX ADMIN — INSULIN ASPART 3 UNITS: 100 INJECTION, SOLUTION INTRAVENOUS; SUBCUTANEOUS at 18:54

## 2025-03-26 RX ADMIN — ROSUVASTATIN CALCIUM 20 MG: 20 TABLET, FILM COATED ORAL at 20:30

## 2025-03-26 RX ADMIN — SODIUM CHLORIDE 1000 ML: 0.9 INJECTION, SOLUTION INTRAVENOUS at 15:41

## 2025-03-26 RX ADMIN — PIPERACILLIN AND TAZOBACTAM 3.38 G: 3; .375 INJECTION, POWDER, FOR SOLUTION INTRAVENOUS at 15:41

## 2025-03-26 ASSESSMENT — ACTIVITIES OF DAILY LIVING (ADL)
ADLS_ACUITY_SCORE: 50
ADLS_ACUITY_SCORE: 26
ADLS_ACUITY_SCORE: 50

## 2025-03-26 ASSESSMENT — COLUMBIA-SUICIDE SEVERITY RATING SCALE - C-SSRS
6. HAVE YOU EVER DONE ANYTHING, STARTED TO DO ANYTHING, OR PREPARED TO DO ANYTHING TO END YOUR LIFE?: NO
2. HAVE YOU ACTUALLY HAD ANY THOUGHTS OF KILLING YOURSELF IN THE PAST MONTH?: NO
1. IN THE PAST MONTH, HAVE YOU WISHED YOU WERE DEAD OR WISHED YOU COULD GO TO SLEEP AND NOT WAKE UP?: NO

## 2025-03-26 NOTE — ED PROVIDER NOTES
Emergency Department Note      History of Present Illness     Chief Complaint   Foot Pain and Toe Pain      HPI   Kaden Cain is a 75 year old male history of diabetes, high cholesterol, coronary disease, on aspirin and Plavix, presents with concerns of left great toe infection.  Patient baseline has peripheral neuropathy so does not have pain in the foot, but he has noticed pain intermittently radiating up his foot and leg, sometimes up to his hip for the last few days.  Sometimes chills.  No fever.  No vomiting.  No difficulty breathing.  Not having bodyaches.  Otherwise overall generally feels pretty well.  Uncertain the cause of the wound.  Having some drainage from the wound as well.    Independent Historian   None    Review of External Notes   Cardiology note 12/9/2024-discusses prior NSTEMI in May.    Past Medical History     Medical History and Problem List   No past medical history on file.    Medications   aspirin 81 MG EC tablet  clopidogrel (PLAVIX) 75 MG tablet  glipiZIDE (GLUCOTROL XL) 10 MG 24 hr tablet  lisinopril (ZESTRIL) 40 MG tablet  metFORMIN (GLUCOPHAGE XR) 500 MG 24 hr tablet  metoprolol tartrate (LOPRESSOR) 25 MG tablet  nitroGLYcerin (NITROSTAT) 0.4 MG sublingual tablet  rosuvastatin (CRESTOR) 20 MG tablet        Surgical History   Past Surgical History:   Procedure Laterality Date    CV CORONARY ANGIOGRAM N/A 5/25/2024    Procedure: Coronary Angiogram;  Surgeon: Salomon Carrillo MD;  Location: [facility] CARDIAC CATH LAB    CV INTRAVASULAR ULTRASOUND N/A 5/25/2024    Procedure: Intravascular Ultrasound;  Surgeon: Salomon Carrillo MD;  Location: [facility] CARDIAC CATH LAB    CV PCI N/A 5/25/2024    Procedure: Percutaneous Coronary Intervention;  Surgeon: Salomon Carrillo MD;  Location: [facility] CARDIAC CATH LAB       Physical Exam     Patient Vitals for the past 24 hrs:   BP Temp Temp src Pulse Resp SpO2 Height Weight   03/26/25 1735 (!) 160/85 -- -- 87 -- 97 % -- --   03/26/25 1645 (!)  "159/84 -- -- 87 -- 98 % -- --   03/26/25 1434 (!) 154/101 98.6  F (37  C) Oral 106 20 96 % 1.854 m (6' 1\") 89.3 kg (196 lb 13.9 oz)     Physical Exam  GENERAL: Patient well-appearing  HEAD: Atraumatic.  NECK: No rigidity  CV: Tachycardic and regular, no murmurs, rubs or gallops  PULM: CTAB with good aeration; no retractions, rales, rhonchi, or wheezing  ABD: Soft, nontender, nondistended, no guarding  DERM: Please see below left foot - left food with foul smell.  First MTP region is swollen, erythematous.  No crepitus.  Baseline hard callus over the toe.     EXTREMITY: Moving all extremities without difficulty.  Left proximal foot and calf soft and nontender  Left proximal foot and calf VASCULAR: Left DP pulse 2+.            Diagnostics     Lab Results   Labs Ordered and Resulted from Time of ED Arrival to Time of ED Departure   COMPREHENSIVE METABOLIC PANEL - Abnormal       Result Value    Sodium 130 (*)     Potassium 4.6      Carbon Dioxide (CO2) 23      Anion Gap 15      Urea Nitrogen 13.9      Creatinine 1.16      GFR Estimate 66      Calcium 9.8      Chloride 92 (*)     Glucose 354 (*)     Alkaline Phosphatase 110      AST 31      ALT 31      Protein Total 8.7 (*)     Albumin 4.7      Bilirubin Total 1.0     GLUCOSE BY METER - Abnormal    GLUCOSE BY METER POCT 349 (*)    CBC WITH PLATELETS AND DIFFERENTIAL - Abnormal    WBC Count 15.0 (*)     RBC Count 4.61      Hemoglobin 14.3      Hematocrit 40.5      MCV 88      MCH 31.0      MCHC 35.3      RDW 11.6      Platelet Count 252      % Neutrophils 82      % Lymphocytes 8      % Monocytes 9      % Eosinophils 0      % Basophils 0      % Immature Granulocytes 1      NRBCs per 100 WBC 0      Absolute Neutrophils 12.3 (*)     Absolute Lymphocytes 1.2      Absolute Monocytes 1.3      Absolute Eosinophils 0.0      Absolute Basophils 0.0      Absolute Immature Granulocytes 0.1      Absolute NRBCs 0.0     HEMOGLOBIN A1C - Abnormal    Estimated Average Glucose 194 (*)     " Hemoglobin A1C 8.4 (*)    LACTIC ACID WHOLE BLOOD WITH 1X REPEAT IN 2 HR WHEN >2 - Normal    Lactic Acid, Initial 1.9     GLUCOSE MONITOR NURSING POCT   GLUCOSE MONITOR NURSING POCT   GLUCOSE MONITOR NURSING POCT   BLOOD CULTURE   BLOOD CULTURE       Imaging   XR Foot Left G/E 3 Views   Final Result   IMPRESSION: Soft tissue irregularity and likely ulceration/wound plantar medial great toe at the level of the distal phalangeal base near the IP joint. Severe great toe soft tissue swelling. No convincing radiographic evidence for acute osteomyelitis of    the great toe. Left forefoot MRI could further assess.      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 No acute displaced left foot fracture or dislocation. Degenerative changes scattered in the left midfoot.      MR Foot Left w/o Contrast    (Results Pending)         Independent Interpretation   X-ray foot no gas or fracture.    ED Course      Medications Administered   Medications   vancomycin (VANCOCIN) 1,750 mg in 0.9% NaCl 517.5 mL intermittent infusion (1,750 mg Intravenous $New Bag 3/26/25 6304)   aspirin (ASA) chewable tablet 81 mg (has no administration in time range)   clopidogrel (PLAVIX) tablet 75 mg (has no administration in time range)   lisinopril (ZESTRIL) tablet 40 mg (has no administration in time range)   metoprolol tartrate (LOPRESSOR) tablet 25 mg (has no administration in time range)   nitroGLYcerin (NITROSTAT) sublingual tablet 0.4 mg (has no administration in time range)   rosuvastatin (CRESTOR) tablet 20 mg (has no administration in time range)   piperacillin-tazobactam (ZOSYN) 3.375 g vial to attach to  mL bag (has no administration in time range)   lidocaine 1 % 0.1-1 mL (has no administration in time range)   lidocaine (LMX4) cream (has no administration in time range)   sodium chloride (PF) 0.9% PF flush 3 mL (has no administration in time range)   sodium chloride (PF) 0.9% PF flush 3  mL (has no administration in time range)   senna-docusate (SENOKOT-S/PERICOLACE) 8.6-50 MG per tablet 1 tablet (has no administration in time range)     Or   senna-docusate (SENOKOT-S/PERICOLACE) 8.6-50 MG per tablet 2 tablet (has no administration in time range)   calcium carbonate (TUMS) chewable tablet 1,000 mg (has no administration in time range)   glucose gel 15-30 g (has no administration in time range)     Or   dextrose 50 % injection 25-50 mL (has no administration in time range)     Or   glucagon injection 1 mg (has no administration in time range)   acetaminophen (TYLENOL) tablet 650 mg (has no administration in time range)     Or   acetaminophen (TYLENOL) Suppository 650 mg (has no administration in time range)   oxyCODONE IR (ROXICODONE) half-tab 2.5 mg (has no administration in time range)   oxyCODONE (ROXICODONE) tablet 5 mg (has no administration in time range)   polyethylene glycol (MIRALAX) Packet 17 g (has no administration in time range)   ondansetron (ZOFRAN ODT) ODT tab 4 mg (has no administration in time range)     Or   ondansetron (ZOFRAN) injection 4 mg (has no administration in time range)   insulin aspart (NovoLOG) injection (RAPID ACTING) (has no administration in time range)   insulin aspart (NovoLOG) injection (RAPID ACTING) (has no administration in time range)   gabapentin (NEURONTIN) capsule 100 mg (has no administration in time range)   piperacillin-tazobactam (ZOSYN) 3.375 g vial to attach to  mL bag (0 g Intravenous Stopped 3/26/25 1624)   sodium chloride 0.9% BOLUS 1,000 mL (0 mLs Intravenous Stopped 3/26/25 1624)       Procedures   Procedures     Discussion of Management   I discussed admission and the plan of care with the Hospitalist Dr. Alberto  Discussed with podiatrist Dr. Medel    ED Course        Additional Documentation  None    Medical Decision Making / Diagnosis     CMS Diagnoses: IV Antibiotics given and/or elevated Lactate of 1.9 and no sepsis note found -  "Delete this reminder and enter the sepsis note or '.edcms' before signing chart.>>>The patient has signs of sepsis   Sepsis ED evaluation   The patient has signs of sepsis as evidenced by:  1. Presence of 2 SIRS criteria, suspected infection, AND  2. Organ dysfunction: Sepsis work up in progress. Will continue to monitor for signs of organ dysfunction    Sepsis Care Initiation: Starting at  1515 PM on 03/26/25, until specified. Prior to this documentation, sepsis, severe sepsis, or septic shock was NOT thought to be a significant cause of illness. This order represents the first time infection was seriously considered to be affecting the patient.    Lactic Acid Results:  Recent Labs   Lab Test 03/26/25  1508   LACT 1.9       3 Hour Bundle 6 Hour Bundle (Reassessment)   Blood Cultures before IV Antibiotics: Yes  Antibiotics given: see below  Prehospital fluid volume (mL):                     Total fluids given (ED +Pre-hospital):  1L   Repeat Lactic Acid Level: Ordered by reflex for 2 hours after initial lactic acid collection.  Vasopressors: none  Repeat perfusion exam: I attest to having performed a repeat sepsis exam and assessment of perfusion at 1600   BMI Readings from Last 1 Encounters:   03/26/25 25.97 kg/m        Anti-infectives (From admission through now)      Start     Dose/Rate Route Frequency Ordered Stop    03/26/25 1540  vancomycin (VANCOCIN) 1,750 mg in 0.9% NaCl 517.5 mL intermittent infusion         1,750 mg  over 2 Hours Intravenous ONCE 03/26/25 1538      03/26/25 1515  piperacillin-tazobactam (ZOSYN) 3.375 g vial to attach to  mL bag        Note to Pharmacy: For [facility], [facility] and [facility]: For Zosyn-naive patients, use the \"Zosyn initial dose + extended infusion\" order panel.    3.375 g  over 30 Minutes Intravenous ONCE 03/26/25 1513 03/26/25 1624                MIPS       None    [facility]   Kaden Cain is a 75 year old male with symptoms concerning for diabetic foot infection.  Differential " diagnosis-considered necrotizing skin infection, osteomyelitis, cellulitis, among others.  Vital signs notable for tachycardia, but patient appears well.  Obtained bedside picture of the wound and sent to podiatry and discussed with them promptly.  Ordered labs, lactate, blood culture.  Does have hyperglycemia and pseudohyponatremia but no signs of DKA.  Empirically started on vancomycin and Zosyn.  Given IV fluids.  X-ray soft tissue swelling but no gas or bony involvement.  Podiatry ordered MRI.  Discussed with hospitalist and patient to be admitted with plan for operative intervention tomorrow.        Disposition   The patient was admitted to the hospital.     Diagnosis     ICD-10-CM    1. Left foot infection  L08.9 Case Request: Left foot first ray resection, left foot debridement     Case Request: Left foot first ray resection, left foot debridement      2. Diabetic foot infection (H)  E11.628     L08.9            Discharge Medications   New Prescriptions    No medications on file         MD Glory Lowe Kevin, MD  03/26/25 3806

## 2025-03-26 NOTE — H&P
[facility] [place]    History and Physical - Hospitalist Service       Date of Admission:  3/26/2025  Primary Care Physician   [facility]  CONSULTANTS: podiatry     Assessment & Plan      Kaden Cain is a 75 year old male who who has a past medical history including non-insulin dependent type 2 diabetes, coronary disease with stents x 4 a year ago, hypertension, hyperlipidemia, and peripheral neuropathy is presenting with a left great toe infection.  Patient noticed some swelling of the toe yesterday but noticed today pain going up his left calf and having this left toe show redness, ulceration, swelling.  He denies any pain within the toe itself.  He has not had any fevers or sweats but has had some chills.  He is also describes some orthopneic type symptoms that is been going on for some time as he is on a beta-blocker as well.  He denies any problems with bleeding or shortness of breath.    In the emergency room patient had a white count of 15 with a hemoglobin of 14.3 and a plate level 252.  His lactic acid was fine at 1.9.  His blood sugar was elevated at 349.  An x-ray and MRI are pending of the left foot.  Podiatry was already consulted in the emergency room and the plan is to go to surgery tomorrow.  He was placed on Zosyn and vancomycin empirically.  Labs came back in the ER with a sodium of 130, creatinine was normal at 1.16.        Left great toe infection, possible osteomyelitis (not septic)  Patient is presenting with a significant infection of left great toe with erythema going up his foot.  Podiatry is already been consulted.  Patient's been warned that he likely will need a first ray amputation versus debridement which will occur tomorrow.  He will be made n.p.o. at midnight.  For pain he will be given Tylenol oxycodone.  An MRI and x-ray are still pending.  He has been empirically started on Zosyn and vancomycin.  His white blood cell count is 15 and this will be  rechecked in the morning.    Type 2 diabetes with peripheral neuropathy  Patient with a A1c of 9 in 5/24/25.  Will recheck one here as his blood sugar is high on admission to [facility].  Patient is on glipizide and metformin at home both will be held as he will be NPO.  Place the patient on an insulin correction scale.  I suspect he may need insulin at home. Will start the patient on neurontin to see if we can affect his neuropathy in a positive way.     Coronary artery disease, history of stents x4  5/2024 patient with MI and had an angiogram,  PCI with JOSE x4 to the prox-mid LAD, distal LAD, proximal circumflex and mid-circumflex. There is residual disease in the rPDA.  Continue on his home apirin, plavix, and beta blocker.  Patient does at times have some orthopnea with standing.  We discussed the importance of making sure it passes prior to him walking to prevent syncope.     Hyponatremia  Sodium is 130.  Was given IV fluids in the emergency room.  Will recheck in the am.     Essential hypertension   Continue on his home lisinopril and lopressor with parameters    Hyperlipidemia   Continue on home crestor    Advance care planning  Had a 25-minute conversation with the patient along with his wife and daughter bedside discussing his goals of care.  At this point the patient wants to be DNR but is okay with short-term intubation.  They understand what his quality life is and they are willing to make decisions for him if he cannot make decisions for himself.  Patient has 2 other daughters that will be informed about his decisions by the other family members.       4Ms:  Polypharmacy: medications reviewed and appropriate though patient does complain of intermittent orthopnea thus his beta-blocker may need to be adjusted in the future  Mentation:   Capacity intact to make medical decisions  Social support: Patient is a retired vernon, he is  and lives in a split-level home with his wife.  He has 3 daughters 2 of  "which are local  Mobility: Does not use aids  What is importmant: Wants to continue live life to its fullest    Discussed plan of care with Dr Holder in the emergency room, wife/daughter bedside  Diet: NPO for Medical/Clinical Reasons Except for: No Exceptions  NPO for Procedure/Surgery per Anesthesia Guidelines Except for: Meds; Clear liquids before procedure/surgery: ADULT (Age GREATER than or Equal to 18 years) - Clear liquids 2 hours before procedure/surgery    DVT Prophylaxis: Pneumatic Compression Devices  Stephens Catheter: Not present  Lines: None     Cardiac Monitoring: None    RESTRAINTS: not indicated  Code Status: No CPR- Pre-arrest intubation OK           This document was created using voice recognition technology.  Please excuse any typographical errors that may have occurred.  Please call with any questions.     Medical Decision Making         Clinically Significant Risk Factors Present on Admission         # Hyponatremia: Lowest Na = 130 mmol/L in last 2 days, will monitor as appropriate  # Hypochloremia: Lowest Cl = 92 mmol/L in last 2 days, will monitor as appropriate        # Drug Induced Platelet Defect: home medication list includes an antiplatelet medication   # Hypertension: Home medication list includes antihypertensive(s)           # Overweight: Estimated body mass index is 25.97 kg/m  as calculated from the following:    Height as of this encounter: 1.854 m (6' 1\").    Weight as of this encounter: 89.3 kg (196 lb 13.9 oz).              Disposition Plan      Expected Discharge Date: 03/28/2025                  Kal Alberto MD  Hospitalist Service  [facility]  Securely message with [employer] (more info)  Text page via [employer] Paging/Directory     Medically Ready for Discharge: Anticipated in 2-4 Days patient lives in a split-level home and depending on what surgery does may require TCU at discharge versus going home with family.      Length of stay: Anticipate greater than " 2 midnight hospitalization for evaluation and treatment of left great toe infection requiring surgery          ______________________________________________________________________    Chief Complaint   Left great toe infection    History is obtained from the patient  Epic reviewed, wife and daughter bedside    History of Present Illness   Kaden Cain is a 75 year old male who who has a past medical history including non-insulin dependent type 2 diabetes, coronary disease with stents x 4 a year ago, hypertension, hyperlipidemia, and peripheral neuropathy is presenting with a left great toe infection.  Patient noticed some swelling of the toe yesterday but noticed today pain going up his left calf and having this left toe show redness, ulceration, swelling.  He denies any pain within the toe itself.  He has not had any fevers or sweats but has had some chills.  He is also describes some orthopneic type symptoms that is been going on for some time as he is on a beta-blocker as well.  He denies any problems with bleeding or shortness of breath.    In the emergency room patient had a white count of 15 with a hemoglobin of 14.3 and a plate level 252.  His lactic acid was fine at 1.9.  His blood sugar was elevated at 349.  An x-ray and MRI are pending of the left foot.  Podiatry was already consulted in the emergency room and the plan is to go to surgery tomorrow.  He was placed on Zosyn and vancomycin empirically.      Past Medical History    No past medical history on file.    Past Surgical History   Past Surgical History:   Procedure Laterality Date    CV CORONARY ANGIOGRAM N/A 5/25/2024    Procedure: Coronary Angiogram;  Surgeon: Salomon Carrillo MD;  Location:  [facility]    CV INTRAVASULAR ULTRASOUND N/A 5/25/2024    Procedure: Intravascular Ultrasound;  Surgeon: Salomon Carrillo MD;  Location: [facility] CARDIAC CATH LAB    CV PCI N/A 5/25/2024    Procedure: Percutaneous Coronary Intervention;   Surgeon: Salomon Carrillo MD;  Location:  [facility]ARDI[facility] CATH LAB       Prior to Admission Medications  pharm D to reconcile though I did discuss these with the patient   Prior to Admission Medications   Prescriptions Last Dose Informant Patient Reported? Taking?   aspirin (ASA) 81 MG chewable tablet   No No   Sig: Take 1 tablet (81 mg) by mouth daily   clopidogrel (PLAVIX) 75 MG tablet   Yes No   Sig: Take 75 mg by mouth daily.   glipiZIDE (GLUCOTROL XL) 5 MG 24 hr tablet   Yes No   Sig: Take 10 mg by mouth daily (with breakfast)   lisinopril (ZESTRIL) 40 MG tablet   No No   Sig: Take 1 tablet (40 mg) by mouth daily.   metFORMIN (GLUCOPHAGE XR) 500 MG 24 hr tablet   Yes No   Sig: Take 1,000 mg by mouth 2 times daily (with meals)   metoprolol tartrate (LOPRESSOR) 25 MG tablet   No No   Sig: Take 1 tablet (25 mg) by mouth 2 times daily   nitroGLYcerin (NITROSTAT) 0.4 MG sublingual tablet   No No   Sig: For chest pain place 1 tablet under the tongue every 5 minutes for 3 doses. If symptoms persist 5 minutes after 1st dose call 911.   rosuvastatin (CRESTOR) 20 MG tablet   No No   Sig: Take 1 tablet (20 mg) by mouth at bedtime      Facility-Administered Medications: None        Allergies   No Known Allergies     REVIEW OF SYSTEMS:  A comprehensive review of systems was negative except for items noted in the HPI.  Patient currently denies any fever,  sweats, nausea, vomiting, diarrhea, shortness of breath, or chest pain.         Physical Exam   Vital Signs: Temp: 98.6  F (37  C) Temp src: Oral BP: (!) 154/101 Pulse: 106   Resp: 20 SpO2: 96 % O2 Device: None (Room air)    Weight: 196 lbs 13.93 oz    General appearance: Patient is alert and oriented x3, no apparent distress, pleasant and conversing normally, speaking in full sentences, appears stated age, sitting in a chair, does not appear ill  HEENT:  Atraumatic/normal cephalic, EOMI, Pupils equally round and reactive to light, sclera non-icteric, Mucous membranes are  moist  RESPIRATORY: Clear to auscultation bilateral, good air movement  CARDIOVASCULAR: Regular rate and rhythm, normal S1/S2, no murmurs  GASTROINTESTINAL: Non-distended, non-tender, soft, bowel sounds present throughout  NEUROLOGIC:  Cranial nerves II-XII intact, without any focal deficits, strength 5/5 throughout  EXTREMITIES:  Moves all extremities, no clubbing, cyanosis, left great toe with ulceration of the medial side associated with swelling and erythema appears to be most certainly infected  :  Kat not present         Data     I have personally reviewed the following data over the past 24 hrs:    15.0 (H)  \   14.3   / 252     130 (L) 92 (L) 13.9 /  354 (H)   4.6 23 1.16 \     ALT: 31 AST: 31 AP: 110 TBILI: 1.0   ALB: 4.7 TOT PROTEIN: 8.7 (H) LIPASE: N/A     Procal: N/A CRP: N/A Lactic Acid: 1.9         Imaging:   Results for orders placed or performed during the hospital encounter of 24   Echocardiogram Complete     Value    LVEF  50-55%    Narrative    [id number]  [id number]  [id number]  [id number]^ARIES^JENARO^EMILY     [facility]  Echocardiography Laboratory  [address] [place] [address]     Name: ROXANNE STEPHENS  MRN: [id number]  : 1950  Study Date: 2024 07:31 AM  Age: 74 yrs  Gender: Male  Patient Location: [facility]  Reason For Study: Ischemic cardiomyopathy  Ordering Physician: JENARO CHRIS  Referring Physician: JENARO CHRIS  Performed By: CHRYSTAL Bronson     BSA: 2.2 m2  Height: 74 in  Weight: 199 lb  HR: 59  BP: 137/79 mmHg  ______________________________________________________________________________  Procedure  Complete Echo Adult.  ______________________________________________________________________________  Interpretation Summary     There is borderline concentric left ventricular hypertrophy.  The visual ejection fraction is 50-55%.  The right ventricle is normal in structure, function and size.  Aortic root dilatation is present.  Ascending aorta  dilatation is present.  The patient exhibited frequent PVCs.  ______________________________________________________________________________  Left Ventricle  The left ventricle is normal in size. There is borderline concentric left  ventricular hypertrophy. The visual ejection fraction is 50-55%. There is  borderline global hypokinesia of the left ventricle.     Right Ventricle  The right ventricle is normal in structure, function and size.     Atria  Normal left atrial size. Right atrial size is normal. There is no color  Doppler evidence of an atrial shunt.     Mitral Valve  The mitral valve is normal in structure and function.     Tricuspid Valve  The tricuspid valve is normal in structure and function. There is trace  tricuspid regurgitation.     Aortic Valve  The aortic valve is normal in structure and function.     Pulmonic Valve  The pulmonic valve is not well visualized. There is trace pulmonic valvular  regurgitation.     Vessels  Aortic root dilatation is present. Ascending aorta dilatation is present.     Pericardium  There is no pericardial effusion.     Rhythm  The patient exhibited frequent PVCs.  ______________________________________________________________________________  MMode/2D Measurements & Calculations  IVSd: 1.2 cm     LVIDd: 4.0 cm  LVIDs: 2.8 cm  LVPWd: 1.1 cm  FS: 28.6 %  LV mass(C)d: 151.3 grams  LV mass(C)dI: 69.8 grams/m2  Ao root diam: 4.0 cm  asc Aorta Diam: 4.0 cm  LVOT diam: 2.5 cm  LVOT area: 4.9 cm2  Ao root diam index Ht(cm/m): 2.1  Ao root diam index BSA (cm/m2): 1.8  Asc Ao diam index BSA (cm/m2): 1.8  Asc Ao diam index Ht(cm/m): 2.1  LA Volume (BP): 45.4 ml     LA Volume Index (BP): 20.9 ml/m2  RV Base: 3.2 cm  RWT: 0.55  TAPSE: 1.9 cm     Doppler Measurements & Calculations  MV E max usman: 60.8 cm/sec  MV A max usman: 66.9 cm/sec  MV E/A: 0.91  MV max P.8 mmHg  MV mean P.70 mmHg  MV V2 VTI: 23.5 cm  MV dec time: 0.30 sec  PA V2 max: 54.9 cm/sec  PA max P.2 mmHg  PA  acc time: 0.13 sec  E/E' av.9  Lateral E/e': 7.4  Medial E/e': 12.4  RV S Rodrigue: 10.1 cm/sec     ______________________________________________________________________________  Report approved by: Verónica Vega 2024 08:23 AM           Procedures: I&D vs 1st ray amputation per podiatry pending 3/27  I have personally have reviewed the patient's most up to date radiologic exams pending,  labs, orders, and medications myself

## 2025-03-26 NOTE — ED NOTES
Denies injury, c/o wound on left great toe, pt states he first noticed it on Monday. Pt admits he does not check his blood sugars very often. C/o pain in left calf and thigh up to his hip. The wound has a strong foul odor, minimal drainage, center is black.

## 2025-03-26 NOTE — PHARMACY-ADMISSION MEDICATION HISTORY
Pharmacy Intern Admission Medication History    Admission medication history is complete. The information provided in this note is only as accurate as the sources available at the time of the update.    Information Source(s): Patient and CareEverywhere/SureScripts via in-person    Pertinent Information: Patient was started on an SGLT2i (dapagliflozin 10 mg daily) for T2D per [facility] Record and patient. However, patient didn't pick the medication due to cost concerns.    Changes made to PTA medication list:  Added: None  Deleted: None  Changed: None    Allergies reviewed with patient and updates made in EHR: yes    Medication History Completed By: Chau Ospina 3/26/2025 4:48 PM    PTA Med List   Medication Sig Last Dose/Taking    aspirin 81 MG EC tablet Take 81 mg by mouth daily. 3/26/2025 Morning    clopidogrel (PLAVIX) 75 MG tablet Take 75 mg by mouth daily. 3/26/2025 Morning    glipiZIDE (GLUCOTROL XL) 10 MG 24 hr tablet Take 10 mg by mouth daily (with breakfast). 3/26/2025 Morning    lisinopril (ZESTRIL) 40 MG tablet Take 1 tablet (40 mg) by mouth daily. 3/26/2025 Morning    metFORMIN (GLUCOPHAGE XR) 500 MG 24 hr tablet Take 1,000 mg by mouth 2 times daily (with meals) 3/26/2025 Morning    metoprolol tartrate (LOPRESSOR) 25 MG tablet Take 1 tablet (25 mg) by mouth 2 times daily 3/26/2025 Morning    nitroGLYcerin (NITROSTAT) 0.4 MG sublingual tablet For chest pain place 1 tablet under the tongue every 5 minutes for 3 doses. If symptoms persist 5 minutes after 1st dose call 911. Taking    rosuvastatin (CRESTOR) 20 MG tablet Take 1 tablet (20 mg) by mouth at bedtime 3/25/2025 Bedtime

## 2025-03-26 NOTE — PROGRESS NOTES
"[facility]    ED Boarding Nurse Handoff Addendum Report:    Date/time: 3/26/2025, 5:43 PM    Activity Level: standby    Fall Risk: No    Active Infusions: Vanc    Current Meds Due: Pending verification of home medications    Current care needs: IV Abx    Oxygen requirements (liters/min and/or FiO2): N/A    Respiratory status: Room air    Vital signs (within last 30 minutes):    Vitals:    03/26/25 1434 03/26/25 1645 03/26/25 1735   BP: (!) 154/101 (!) 159/84 (!) 160/85   Pulse: 106 87 87   Resp: 20     Temp: 98.6  F (37  C)     TempSrc: Oral     SpO2: 96% 98% 97%   Weight: 89.3 kg (196 lb 13.9 oz)     Height: 1.854 m (6' 1\")         Focused assessment within last 30 minutes:    Pt is A&Ox4, VSS. Pt denies pain in his foot. Pt started on vanc, paused d/t MRI. Pt needs BG check with dinner following MRI.     ED Boarding Nurse name: Alesha Massey RN   "

## 2025-03-26 NOTE — ED TRIAGE NOTES
Pt presents for evaluation of left great toe pain with possible infection. Pt first noticed wound today. Is a diabetic, checks feet daily, on PO hypoglycemics. Drainage started today, serosanguinous. Denies injury. Has felt pain in the calf and up the leg since Monday. BG in triage 349.

## 2025-03-26 NOTE — ED NOTES
"[facility]  ED Nurse Handoff Report    ED Chief complaint: Foot Pain and Toe Pain  . ED Diagnosis:   Final diagnoses:   Diabetic foot infection (H)       Allergies: No Known Allergies    Code Status: DNR    Activity level - Baseline/Home:  independent.  Activity Level - Current:   standby.   Lift room needed: No.   Bariatric: No   Needed: No   Isolation: No.   Infection: Not Applicable.     Respiratory status: Room air    Vital Signs (within 30 minutes):   Vitals:    03/26/25 1434   BP: (!) 154/101   Pulse: 106   Resp: 20   Temp: 98.6  F (37  C)   TempSrc: Oral   SpO2: 96%   Weight: 89.3 kg (196 lb 13.9 oz)   Height: 1.854 m (6' 1\")       Cardiac Rhythm:  ,      Pain level:    Patient confused: No.   Patient Falls Risk: patient and family education.   Elimination Status: Has voided     Patient Report - Initial Complaint: Toe wound.   Focused Assessment: Kaden Cain is a 75 year old male who who has a past medical history including non-insulin dependent type 2 diabetes, coronary disease with stents x 4 a year ago, hypertension, hyperlipidemia, and peripheral neuropathy is presenting with a left great toe infection.  Patient noticed some swelling of the toe yesterday but noticed today pain going up his left calf and having this left toe show redness, ulceration, swelling.  He denies any pain within the toe itself.  He has not had any fevers or sweats but has had some chills.  He is also describes some orthopneic type symptoms that is been going on for some time as he is on a beta-blocker as well.  He denies any problems with bleeding or shortness of breath.     In the emergency room patient had a white count of 15 with a hemoglobin of 14.3 and a plate level 252.  His lactic acid was fine at 1.9.  His blood sugar was elevated at 349.  An x-ray and MRI are pending of the left foot.  Podiatry was already consulted in the emergency room and the plan is to go to surgery tomorrow.  He was " placed on Zosyn and vancomycin empirically.     Abnormal Results:   Labs Ordered and Resulted from Time of ED Arrival to Time of ED Departure   COMPREHENSIVE METABOLIC PANEL - Abnormal       Result Value    Sodium 130 (*)     Potassium 4.6      Carbon Dioxide (CO2) 23      Anion Gap 15      Urea Nitrogen 13.9      Creatinine 1.16      GFR Estimate 66      Calcium 9.8      Chloride 92 (*)     Glucose 354 (*)     Alkaline Phosphatase 110      AST 31      ALT 31      Protein Total 8.7 (*)     Albumin 4.7      Bilirubin Total 1.0     GLUCOSE BY METER - Abnormal    GLUCOSE BY METER POCT 349 (*)    CBC WITH PLATELETS AND DIFFERENTIAL - Abnormal    WBC Count 15.0 (*)     RBC Count 4.61      Hemoglobin 14.3      Hematocrit 40.5      MCV 88      MCH 31.0      MCHC 35.3      RDW 11.6      Platelet Count 252      % Neutrophils 82      % Lymphocytes 8      % Monocytes 9      % Eosinophils 0      % Basophils 0      % Immature Granulocytes 1      NRBCs per 100 WBC 0      Absolute Neutrophils 12.3 (*)     Absolute Lymphocytes 1.2      Absolute Monocytes 1.3      Absolute Eosinophils 0.0      Absolute Basophils 0.0      Absolute Immature Granulocytes 0.1      Absolute NRBCs 0.0     LACTIC ACID WHOLE BLOOD WITH 1X REPEAT IN 2 HR WHEN >2 - Normal    Lactic Acid, Initial 1.9     GLUCOSE MONITOR NURSING POCT   GLUCOSE MONITOR NURSING POCT   GLUCOSE MONITOR NURSING POCT   HEMOGLOBIN A1C   BLOOD CULTURE   BLOOD CULTURE        XR Foot Left G/E 3 Views         MR Foot Left w/o Contrast    (Results Pending)       Treatments provided: SEE MAR  Family Comments: AT bedside  OBS brochure/video discussed/provided to patient:  Yes  ED Medications:   Medications   vancomycin (VANCOCIN) 1,750 mg in 0.9% NaCl 517.5 mL intermittent infusion (has no administration in time range)   aspirin (ASA) chewable tablet 81 mg (has no administration in time range)   clopidogrel (PLAVIX) tablet 75 mg (has no administration in time range)   lisinopril (ZESTRIL)  tablet 40 mg (has no administration in time range)   metoprolol tartrate (LOPRESSOR) tablet 25 mg (has no administration in time range)   nitroGLYcerin (NITROSTAT) sublingual tablet 0.4 mg (has no administration in time range)   rosuvastatin (CRESTOR) tablet 20 mg (has no administration in time range)   piperacillin-tazobactam (ZOSYN) 3.375 g vial to attach to  mL bag (has no administration in time range)   lidocaine 1 % 0.1-1 mL (has no administration in time range)   lidocaine (LMX4) cream (has no administration in time range)   sodium chloride (PF) 0.9% PF flush 3 mL (has no administration in time range)   sodium chloride (PF) 0.9% PF flush 3 mL (has no administration in time range)   senna-docusate (SENOKOT-S/PERICOLACE) 8.6-50 MG per tablet 1 tablet (has no administration in time range)     Or   senna-docusate (SENOKOT-S/PERICOLACE) 8.6-50 MG per tablet 2 tablet (has no administration in time range)   calcium carbonate (TUMS) chewable tablet 1,000 mg (has no administration in time range)   glucose gel 15-30 g (has no administration in time range)     Or   dextrose 50 % injection 25-50 mL (has no administration in time range)     Or   glucagon injection 1 mg (has no administration in time range)   acetaminophen (TYLENOL) tablet 650 mg (has no administration in time range)     Or   acetaminophen (TYLENOL) Suppository 650 mg (has no administration in time range)   oxyCODONE IR (ROXICODONE) half-tab 2.5 mg (has no administration in time range)   oxyCODONE (ROXICODONE) tablet 5 mg (has no administration in time range)   polyethylene glycol (MIRALAX) Packet 17 g (has no administration in time range)   ondansetron (ZOFRAN ODT) ODT tab 4 mg (has no administration in time range)     Or   ondansetron (ZOFRAN) injection 4 mg (has no administration in time range)   insulin aspart (NovoLOG) injection (RAPID ACTING) (has no administration in time range)   insulin aspart (NovoLOG) injection (RAPID ACTING) (has no  administration in time range)   gabapentin (NEURONTIN) capsule 100 mg (has no administration in time range)   piperacillin-tazobactam (ZOSYN) 3.375 g vial to attach to  mL bag (0 g Intravenous Stopped 3/26/25 1624)   sodium chloride 0.9% BOLUS 1,000 mL (0 mLs Intravenous Stopped 3/26/25 1624)       Drips infusing:  No  For the majority of the shift this patient was Green.   Interventions performed were NA.    Sepsis treatment initiated: No    Cares/treatment/interventions/medications to be completed following ED care: IV abx, podiatry consult    ED Nurse Name: Mary Luke RN  4:37 PM    RECEIVING UNIT ED HANDOFF REVIEW    Above ED Nurse Handoff Report was reviewed: Yes  Reviewed by: Naeem Beverly RN on March 26, 2025 at 5:22 PM   ENRIQUE Adams called the ED to inform them the note was read: No

## 2025-03-27 ENCOUNTER — ANESTHESIA (OUTPATIENT)
Dept: SURGERY | Facility: CLINIC | Age: 75
End: 2025-03-27
Payer: MEDICARE

## 2025-03-27 ENCOUNTER — ANESTHESIA EVENT (OUTPATIENT)
Dept: SURGERY | Facility: CLINIC | Age: 75
End: 2025-03-27
Payer: MEDICARE

## 2025-03-27 VITALS
BODY MASS INDEX: 25.98 KG/M2 | WEIGHT: 195.99 LBS | DIASTOLIC BLOOD PRESSURE: 70 MMHG | TEMPERATURE: 98.9 F | HEIGHT: 73 IN | SYSTOLIC BLOOD PRESSURE: 127 MMHG | OXYGEN SATURATION: 95 % | HEART RATE: 80 BPM | RESPIRATION RATE: 18 BRPM

## 2025-03-27 LAB
ANION GAP SERPL CALCULATED.3IONS-SCNC: 10 MMOL/L (ref 7–15)
BACTERIA BLD CULT: NORMAL
BACTERIA BLD CULT: NORMAL
BACTERIA SPEC CULT: ABNORMAL
BUN SERPL-MCNC: 12.4 MG/DL (ref 8–23)
CALCIUM SERPL-MCNC: 8.5 MG/DL (ref 8.8–10.4)
CHLORIDE SERPL-SCNC: 101 MMOL/L (ref 98–107)
CREAT SERPL-MCNC: 1.11 MG/DL (ref 0.67–1.17)
EGFRCR SERPLBLD CKD-EPI 2021: 70 ML/MIN/1.73M2
ERYTHROCYTE [DISTWIDTH] IN BLOOD BY AUTOMATED COUNT: 11.6 % (ref 10–15)
GLUCOSE BLDC GLUCOMTR-MCNC: 186 MG/DL (ref 70–99)
GLUCOSE BLDC GLUCOMTR-MCNC: 219 MG/DL (ref 70–99)
GLUCOSE BLDC GLUCOMTR-MCNC: 257 MG/DL (ref 70–99)
GLUCOSE BLDC GLUCOMTR-MCNC: 260 MG/DL (ref 70–99)
GLUCOSE BLDC GLUCOMTR-MCNC: 262 MG/DL (ref 70–99)
GLUCOSE BLDC GLUCOMTR-MCNC: 277 MG/DL (ref 70–99)
GLUCOSE SERPL-MCNC: 277 MG/DL (ref 70–99)
GRAM STAIN RESULT: ABNORMAL
GRAM STAIN RESULT: ABNORMAL
HCO3 SERPL-SCNC: 23 MMOL/L (ref 22–29)
HCT VFR BLD AUTO: 34.6 % (ref 40–53)
HGB BLD-MCNC: 11.8 G/DL (ref 13.3–17.7)
INR PPP: 1.28 (ref 0.85–1.15)
MCH RBC QN AUTO: 30.1 PG (ref 26.5–33)
MCHC RBC AUTO-ENTMCNC: 34.1 G/DL (ref 31.5–36.5)
MCV RBC AUTO: 88 FL (ref 78–100)
PLATELET # BLD AUTO: 202 10E3/UL (ref 150–450)
POTASSIUM SERPL-SCNC: 4.2 MMOL/L (ref 3.4–5.3)
RBC # BLD AUTO: 3.92 10E6/UL (ref 4.4–5.9)
SODIUM SERPL-SCNC: 134 MMOL/L (ref 135–145)
WBC # BLD AUTO: 10.7 10E3/UL (ref 4–11)

## 2025-03-27 PROCEDURE — 36415 COLL VENOUS BLD VENIPUNCTURE: CPT | Performed by: INTERNAL MEDICINE

## 2025-03-27 PROCEDURE — 11046 DBRDMT MUSC&/FSCA EA ADDL: CPT | Performed by: PODIATRIST

## 2025-03-27 PROCEDURE — 250N000011 HC RX IP 250 OP 636: Performed by: NURSE ANESTHETIST, CERTIFIED REGISTERED

## 2025-03-27 PROCEDURE — 250N000011 HC RX IP 250 OP 636: Performed by: PODIATRIST

## 2025-03-27 PROCEDURE — 710N000012 HC RECOVERY PHASE 2, PER MINUTE: Performed by: PODIATRIST

## 2025-03-27 PROCEDURE — 87077 CULTURE AEROBIC IDENTIFY: CPT | Performed by: PODIATRIST

## 2025-03-27 PROCEDURE — 272N000001 HC OR GENERAL SUPPLY STERILE: Performed by: PODIATRIST

## 2025-03-27 PROCEDURE — 120N000001 HC R&B MED SURG/OB

## 2025-03-27 PROCEDURE — 82310 ASSAY OF CALCIUM: CPT | Performed by: INTERNAL MEDICINE

## 2025-03-27 PROCEDURE — 80048 BASIC METABOLIC PNL TOTAL CA: CPT | Performed by: INTERNAL MEDICINE

## 2025-03-27 PROCEDURE — 87205 SMEAR GRAM STAIN: CPT | Performed by: PODIATRIST

## 2025-03-27 PROCEDURE — 258N000003 HC RX IP 258 OP 636: Performed by: ANESTHESIOLOGY

## 2025-03-27 PROCEDURE — 99222 1ST HOSP IP/OBS MODERATE 55: CPT | Mod: 25 | Performed by: PODIATRIST

## 2025-03-27 PROCEDURE — 85027 COMPLETE CBC AUTOMATED: CPT | Performed by: INTERNAL MEDICINE

## 2025-03-27 PROCEDURE — 250N000013 HC RX MED GY IP 250 OP 250 PS 637: Performed by: INTERNAL MEDICINE

## 2025-03-27 PROCEDURE — 11043 DBRDMT MUSC&/FSCA 1ST 20/<: CPT | Performed by: PODIATRIST

## 2025-03-27 PROCEDURE — 370N000017 HC ANESTHESIA TECHNICAL FEE, PER MIN: Performed by: PODIATRIST

## 2025-03-27 PROCEDURE — 87075 CULTR BACTERIA EXCEPT BLOOD: CPT | Performed by: PODIATRIST

## 2025-03-27 PROCEDURE — 258N000003 HC RX IP 258 OP 636: Performed by: NURSE ANESTHETIST, CERTIFIED REGISTERED

## 2025-03-27 PROCEDURE — 250N000013 HC RX MED GY IP 250 OP 250 PS 637: Performed by: PODIATRIST

## 2025-03-27 PROCEDURE — 258N000003 HC RX IP 258 OP 636: Performed by: INTERNAL MEDICINE

## 2025-03-27 PROCEDURE — 87070 CULTURE OTHR SPECIMN AEROBIC: CPT | Performed by: PODIATRIST

## 2025-03-27 PROCEDURE — 250N000011 HC RX IP 250 OP 636: Mod: JW | Performed by: PODIATRIST

## 2025-03-27 PROCEDURE — 85610 PROTHROMBIN TIME: CPT | Performed by: INTERNAL MEDICINE

## 2025-03-27 PROCEDURE — 360N000082 HC SURGERY LEVEL 2 W/ FLUORO, PER MIN: Performed by: PODIATRIST

## 2025-03-27 PROCEDURE — L4361 PNEUMA/VAC WALK BOOT PRE OTS: HCPCS

## 2025-03-27 PROCEDURE — 250N000012 HC RX MED GY IP 250 OP 636 PS 637: Performed by: ANESTHESIOLOGY

## 2025-03-27 PROCEDURE — 0JBR0ZZ EXCISION OF LEFT FOOT SUBCUTANEOUS TISSUE AND FASCIA, OPEN APPROACH: ICD-10-PCS | Performed by: PODIATRIST

## 2025-03-27 PROCEDURE — 250N000012 HC RX MED GY IP 250 OP 636 PS 637: Performed by: PODIATRIST

## 2025-03-27 PROCEDURE — 250N000011 HC RX IP 250 OP 636: Performed by: INTERNAL MEDICINE

## 2025-03-27 PROCEDURE — 87176 TISSUE HOMOGENIZATION CULTR: CPT | Performed by: PODIATRIST

## 2025-03-27 PROCEDURE — 99233 SBSQ HOSP IP/OBS HIGH 50: CPT | Performed by: PHYSICIAN ASSISTANT

## 2025-03-27 PROCEDURE — 999N000141 HC STATISTIC PRE-PROCEDURE NURSING ASSESSMENT: Performed by: PODIATRIST

## 2025-03-27 RX ORDER — FENTANYL CITRATE 50 UG/ML
50 INJECTION, SOLUTION INTRAMUSCULAR; INTRAVENOUS EVERY 5 MIN PRN
Status: DISCONTINUED | OUTPATIENT
Start: 2025-03-27 | End: 2025-03-27 | Stop reason: HOSPADM

## 2025-03-27 RX ORDER — NALOXONE HYDROCHLORIDE 0.4 MG/ML
0.1 INJECTION, SOLUTION INTRAMUSCULAR; INTRAVENOUS; SUBCUTANEOUS
Status: DISCONTINUED | OUTPATIENT
Start: 2025-03-27 | End: 2025-03-27 | Stop reason: HOSPADM

## 2025-03-27 RX ORDER — BUPIVACAINE HYDROCHLORIDE 5 MG/ML
INJECTION, SOLUTION EPIDURAL; INTRACAUDAL; PERINEURAL PRN
Status: DISCONTINUED | OUTPATIENT
Start: 2025-03-27 | End: 2025-03-27 | Stop reason: HOSPADM

## 2025-03-27 RX ORDER — METFORMIN HYDROCHLORIDE 500 MG/1
1000 TABLET, EXTENDED RELEASE ORAL 2 TIMES DAILY WITH MEALS
Status: DISCONTINUED | OUTPATIENT
Start: 2025-03-27 | End: 2025-03-29 | Stop reason: HOSPADM

## 2025-03-27 RX ORDER — SODIUM CHLORIDE, SODIUM LACTATE, POTASSIUM CHLORIDE, CALCIUM CHLORIDE 600; 310; 30; 20 MG/100ML; MG/100ML; MG/100ML; MG/100ML
INJECTION, SOLUTION INTRAVENOUS CONTINUOUS
Status: DISCONTINUED | OUTPATIENT
Start: 2025-03-27 | End: 2025-03-27 | Stop reason: HOSPADM

## 2025-03-27 RX ORDER — GLIPIZIDE 2.5 MG/1
10 TABLET, EXTENDED RELEASE ORAL
Status: DISCONTINUED | OUTPATIENT
Start: 2025-03-28 | End: 2025-03-29 | Stop reason: HOSPADM

## 2025-03-27 RX ORDER — DEXAMETHASONE SODIUM PHOSPHATE 4 MG/ML
4 INJECTION, SOLUTION INTRA-ARTICULAR; INTRALESIONAL; INTRAMUSCULAR; INTRAVENOUS; SOFT TISSUE
Status: DISCONTINUED | OUTPATIENT
Start: 2025-03-27 | End: 2025-03-27 | Stop reason: HOSPADM

## 2025-03-27 RX ORDER — ONDANSETRON 2 MG/ML
INJECTION INTRAMUSCULAR; INTRAVENOUS PRN
Status: DISCONTINUED | OUTPATIENT
Start: 2025-03-27 | End: 2025-03-27

## 2025-03-27 RX ORDER — HYDROMORPHONE HCL IN WATER/PF 6 MG/30 ML
0.4 PATIENT CONTROLLED ANALGESIA SYRINGE INTRAVENOUS EVERY 5 MIN PRN
Status: DISCONTINUED | OUTPATIENT
Start: 2025-03-27 | End: 2025-03-27 | Stop reason: HOSPADM

## 2025-03-27 RX ORDER — OXYCODONE HYDROCHLORIDE 5 MG/1
10 TABLET ORAL
Status: DISCONTINUED | OUTPATIENT
Start: 2025-03-27 | End: 2025-03-27 | Stop reason: HOSPADM

## 2025-03-27 RX ORDER — HYDROMORPHONE HCL IN WATER/PF 6 MG/30 ML
0.2 PATIENT CONTROLLED ANALGESIA SYRINGE INTRAVENOUS EVERY 5 MIN PRN
Status: DISCONTINUED | OUTPATIENT
Start: 2025-03-27 | End: 2025-03-27 | Stop reason: HOSPADM

## 2025-03-27 RX ORDER — SODIUM CHLORIDE, SODIUM LACTATE, POTASSIUM CHLORIDE, CALCIUM CHLORIDE 600; 310; 30; 20 MG/100ML; MG/100ML; MG/100ML; MG/100ML
INJECTION, SOLUTION INTRAVENOUS CONTINUOUS PRN
Status: DISCONTINUED | OUTPATIENT
Start: 2025-03-27 | End: 2025-03-27

## 2025-03-27 RX ORDER — OXYCODONE HYDROCHLORIDE 5 MG/1
5 TABLET ORAL
Status: DISCONTINUED | OUTPATIENT
Start: 2025-03-27 | End: 2025-03-27 | Stop reason: HOSPADM

## 2025-03-27 RX ORDER — DAPAGLIFLOZIN 5 MG/1
10 TABLET, FILM COATED ORAL DAILY
Status: DISCONTINUED | OUTPATIENT
Start: 2025-03-28 | End: 2025-03-27

## 2025-03-27 RX ORDER — LABETALOL HYDROCHLORIDE 5 MG/ML
10 INJECTION, SOLUTION INTRAVENOUS
Status: DISCONTINUED | OUTPATIENT
Start: 2025-03-27 | End: 2025-03-27 | Stop reason: HOSPADM

## 2025-03-27 RX ORDER — PROPOFOL 10 MG/ML
INJECTION, EMULSION INTRAVENOUS CONTINUOUS PRN
Status: DISCONTINUED | OUTPATIENT
Start: 2025-03-27 | End: 2025-03-27

## 2025-03-27 RX ORDER — ONDANSETRON 2 MG/ML
4 INJECTION INTRAMUSCULAR; INTRAVENOUS EVERY 30 MIN PRN
Status: DISCONTINUED | OUTPATIENT
Start: 2025-03-27 | End: 2025-03-27 | Stop reason: HOSPADM

## 2025-03-27 RX ORDER — FENTANYL CITRATE 50 UG/ML
25 INJECTION, SOLUTION INTRAMUSCULAR; INTRAVENOUS EVERY 5 MIN PRN
Status: DISCONTINUED | OUTPATIENT
Start: 2025-03-27 | End: 2025-03-27 | Stop reason: HOSPADM

## 2025-03-27 RX ORDER — ACETAMINOPHEN 325 MG/1
975 TABLET ORAL ONCE
Status: DISCONTINUED | OUTPATIENT
Start: 2025-03-27 | End: 2025-03-27 | Stop reason: HOSPADM

## 2025-03-27 RX ORDER — HYDRALAZINE HYDROCHLORIDE 20 MG/ML
2.5-5 INJECTION INTRAMUSCULAR; INTRAVENOUS EVERY 10 MIN PRN
Status: DISCONTINUED | OUTPATIENT
Start: 2025-03-27 | End: 2025-03-27 | Stop reason: HOSPADM

## 2025-03-27 RX ORDER — HYDROXYZINE HYDROCHLORIDE 10 MG/1
10 TABLET, FILM COATED ORAL EVERY 6 HOURS PRN
Status: DISCONTINUED | OUTPATIENT
Start: 2025-03-27 | End: 2025-03-27 | Stop reason: HOSPADM

## 2025-03-27 RX ORDER — PIPERACILLIN SODIUM, TAZOBACTAM SODIUM 4; .5 G/20ML; G/20ML
4.5 INJECTION, POWDER, LYOPHILIZED, FOR SOLUTION INTRAVENOUS EVERY 6 HOURS
Status: DISCONTINUED | OUTPATIENT
Start: 2025-03-27 | End: 2025-03-29 | Stop reason: HOSPADM

## 2025-03-27 RX ORDER — ONDANSETRON 4 MG/1
4 TABLET, ORALLY DISINTEGRATING ORAL EVERY 30 MIN PRN
Status: DISCONTINUED | OUTPATIENT
Start: 2025-03-27 | End: 2025-03-27 | Stop reason: HOSPADM

## 2025-03-27 RX ORDER — PROPOFOL 10 MG/ML
INJECTION, EMULSION INTRAVENOUS PRN
Status: DISCONTINUED | OUTPATIENT
Start: 2025-03-27 | End: 2025-03-27

## 2025-03-27 RX ORDER — ALBUTEROL SULFATE 0.83 MG/ML
2.5 SOLUTION RESPIRATORY (INHALATION) EVERY 4 HOURS PRN
Status: DISCONTINUED | OUTPATIENT
Start: 2025-03-27 | End: 2025-03-27 | Stop reason: HOSPADM

## 2025-03-27 RX ORDER — MEPERIDINE HYDROCHLORIDE 25 MG/ML
12.5 INJECTION INTRAMUSCULAR; INTRAVENOUS; SUBCUTANEOUS EVERY 5 MIN PRN
Status: DISCONTINUED | OUTPATIENT
Start: 2025-03-27 | End: 2025-03-27 | Stop reason: HOSPADM

## 2025-03-27 RX ADMIN — PIPERACILLIN AND TAZOBACTAM 4.5 G: 4; .5 INJECTION, POWDER, FOR SOLUTION INTRAVENOUS at 22:29

## 2025-03-27 RX ADMIN — VANCOMYCIN HYDROCHLORIDE 1500 MG: 10 INJECTION, POWDER, LYOPHILIZED, FOR SOLUTION INTRAVENOUS at 11:58

## 2025-03-27 RX ADMIN — SODIUM CHLORIDE, SODIUM LACTATE, POTASSIUM CHLORIDE, AND CALCIUM CHLORIDE: .6; .31; .03; .02 INJECTION, SOLUTION INTRAVENOUS at 11:53

## 2025-03-27 RX ADMIN — GABAPENTIN 100 MG: 100 CAPSULE ORAL at 14:17

## 2025-03-27 RX ADMIN — MIDAZOLAM 1 MG: 1 INJECTION INTRAMUSCULAR; INTRAVENOUS at 12:02

## 2025-03-27 RX ADMIN — ROSUVASTATIN CALCIUM 20 MG: 20 TABLET, FILM COATED ORAL at 21:11

## 2025-03-27 RX ADMIN — INSULIN GLARGINE 10 UNITS: 100 INJECTION, SOLUTION SUBCUTANEOUS at 14:17

## 2025-03-27 RX ADMIN — PROPOFOL 125 MCG/KG/MIN: 10 INJECTION, EMULSION INTRAVENOUS at 12:08

## 2025-03-27 RX ADMIN — SODIUM CHLORIDE 4 UNITS: 9 INJECTION, SOLUTION INTRAVENOUS at 11:50

## 2025-03-27 RX ADMIN — METOPROLOL TARTRATE 25 MG: 25 TABLET, FILM COATED ORAL at 21:11

## 2025-03-27 RX ADMIN — ONDANSETRON 4 MG: 2 INJECTION INTRAMUSCULAR; INTRAVENOUS at 12:11

## 2025-03-27 RX ADMIN — PROPOFOL 30 MG: 10 INJECTION, EMULSION INTRAVENOUS at 12:10

## 2025-03-27 RX ADMIN — INSULIN ASPART 1 UNITS: 100 INJECTION, SOLUTION INTRAVENOUS; SUBCUTANEOUS at 17:39

## 2025-03-27 RX ADMIN — GABAPENTIN 100 MG: 100 CAPSULE ORAL at 08:30

## 2025-03-27 RX ADMIN — SODIUM CHLORIDE, SODIUM LACTATE, POTASSIUM CHLORIDE, AND CALCIUM CHLORIDE: .6; .31; .03; .02 INJECTION, SOLUTION INTRAVENOUS at 12:00

## 2025-03-27 RX ADMIN — LISINOPRIL 40 MG: 40 TABLET ORAL at 08:31

## 2025-03-27 RX ADMIN — PIPERACILLIN AND TAZOBACTAM 3.38 G: 3; .375 INJECTION, POWDER, FOR SOLUTION INTRAVENOUS at 04:07

## 2025-03-27 RX ADMIN — PIPERACILLIN AND TAZOBACTAM 4.5 G: 4; .5 INJECTION, POWDER, FOR SOLUTION INTRAVENOUS at 10:44

## 2025-03-27 RX ADMIN — GABAPENTIN 100 MG: 100 CAPSULE ORAL at 21:11

## 2025-03-27 RX ADMIN — SODIUM CHLORIDE 4 UNITS: 9 INJECTION, SOLUTION INTRAVENOUS at 13:22

## 2025-03-27 RX ADMIN — METOPROLOL TARTRATE 25 MG: 25 TABLET, FILM COATED ORAL at 08:30

## 2025-03-27 RX ADMIN — PIPERACILLIN AND TAZOBACTAM 4.5 G: 4; .5 INJECTION, POWDER, FOR SOLUTION INTRAVENOUS at 16:26

## 2025-03-27 RX ADMIN — ACETAMINOPHEN 650 MG: 325 TABLET, FILM COATED ORAL at 01:18

## 2025-03-27 ASSESSMENT — ACTIVITIES OF DAILY LIVING (ADL)
ADLS_ACUITY_SCORE: 31
ADLS_ACUITY_SCORE: 32
ADLS_ACUITY_SCORE: 32
ADLS_ACUITY_SCORE: 26
ADLS_ACUITY_SCORE: 31
ADLS_ACUITY_SCORE: 32
ADLS_ACUITY_SCORE: 31
ADLS_ACUITY_SCORE: 32
ADLS_ACUITY_SCORE: 31
ADLS_ACUITY_SCORE: 31
ADLS_ACUITY_SCORE: 32
ADLS_ACUITY_SCORE: 32
ADLS_ACUITY_SCORE: 31
ADLS_ACUITY_SCORE: 32

## 2025-03-27 NOTE — CONSULTS
Patient has Medicare D through [employer].    Jardiance[employer]Farx[employer]  --Upon receipt of RX, [facility] can provide 1 mo free using one-time  voucher.  --Patient is will pay 100% of the first $590 in drug costs ($453 remains; first month will be as much as $489)  --Subsequent fills will be $149/mo.  --If/when total out of pocket drug costs exceed $2000, patient will pay $0 for all covered drugs for the remainder of the year.    As of 1/1/23, formulary insulins are covered under all Medicare drug plans for $35 per 30 day supply year-round.  The formulary insulins under this plan are:    Rapid-acting insulin   Insulin Aspart Flexpens      Intermediate   Novolin N Flexpens      Basal   Insulin glargine-ygfn pens   Insulin degludec pens     Gita Miller  Pharmacy Technician/Liaison, Discharge Pharmacy   [phone] (voice or text)  [email or web address]  Pharmacy test claims are estimates and may not reflect final costs.  Suggested alternatives aim to be cost-effective and may not be therapeutically equivalent as this consult is informational and does not constitute medical advice.  Clinical decisions should be made by qualified healthcare providers.

## 2025-03-27 NOTE — OP NOTE
Operative Report    March 27, 2025    SURGEON:  Wilmer Prajapati DPM, FACFAS, MS    PREOPERATIVE DIAGNOSIS:   1) necrotic wounds, cellulitis and abscess left great toe  2) type 2 diabetes and peripheral neuropathy    POSTOPERATIVE DIAGNOSIS: Same    PROCEDURE(S):  1) excisional debridement, left great toe, involving area greater than 20 cm .  Excisional debridement was carried down to, including, the deep fascia.    SPECIMENS: Deep tissue was harvested after initial debridement and irrigation for aerobic and anaerobic culture.    ANESTHESIA: MAC with local    HEMOSTASIS: None    ESTIMATED BLOOD LOSS: 10 mL    MATERIALS: 1/4 inch iodoform packing dressing    INJECTABLES:  0.5% Marcaine injected pre-operatively    COMPLICATIONS:   None apparent    INTRAOPERATIVE FINDINGS: The area debrided via excision measured 7 cm x 6 cm in surface area.  There was up to 2 cm of undermining distally.  The 2 deepest portions of the wound measured nearly 1 cm in depth.  These 2 deeper areas communicated subcutaneously.  In this region there was purulence.    INDICATIONS FOR SURGERY:  Kaden Cain is a pleasant 75 year-old male with a past medical history significant for type 2 diabetes, peripheral neuropathy, CAD with stents x 4, hypertension, hyperlipidemia admitted for a diabetic left foot infection largely involving the great toe.  Clinical exam suggests a significant infection, yet x-ray and MRI do not support underlying osteomyelitis.  Therefore excisional debridement, washout and exploration was recommended.  The procedure was discussed in detail.  Post op recovery scenarios were discussed.  I explained he might need additional surgery including toe amputation.  This will depend on how the soft tissues demarcate.  No guarantees were given.    PROCEDURE: Kaden Cain was transported the operating room and placed supine the operating table.  IV sedation was initiated.  A timeout was called and local anesthetic was injected  into the left foot in a Brambila block fashion.  The left foot was then prepped and draped in the normal aseptic fashion.  A timeout for the procedure was called.    Working from proximal to distal in a methodical fashion, excisional debridement was performed using a #15 scalpel, rongeur and curette.  Nonviable skin was excised.  The more proximal aspect of the wound was curettaged until there was healthy bleeding.  Into deeper nonviable appearing areas, excisional debridement was carried out down to including the deep fascia.  Purulence and malodor noted.  The 2 deeper areas communicated subcutaneously.  Wounds were irrigated with a copious amount normal sterile saline.    Using a rongeur, deep tissue samples were harvested from the more proximal aspect of the wound to serve as aerobic and anaerobic cultures.    Additional irrigation was performed.  A well-padded compressive dressing was placed.    Kaden Cain tolerated the anesthesia and procedure well.  No apparent complications.  EBL 10 mL.  Counts were correct.  Specimens were successfully handed off and correctly labeled.    Plan:  Continue empiric antibiotic coverage  Deep tissue culture surveillance  Monitor the left great toe to see how soft tissues demarcate  If ongoing infection and/or necrosis, additional surgery will be needed.  This might necessitate amputation of the toe.  We have discussed both 1) attempts to salvage this toe versus 2) amputation, regardless of underlying bone infection.  Amputation with primary closure might lead to the more predictable and timely recovery.  With the significant hallux limitus and peripheral neuropathy, he will continue to be at high risk for future ulceration and complication.  Dr. Lorene Flores will assume our inpatient service tomorrow through the weekend.    Wilmer Prajapati DPM, FACFAS, MS  M [facility] Department of Podiatry/Foot & Ankle Surgery

## 2025-03-27 NOTE — BRIEF OP NOTE
[facility]    Brief Operative Note    Pre-operative diagnosis: Left foot infection [L08.9]  Post-operative diagnosis Same as pre-operative diagnosis    Procedure: left foot excisional irrigation and debridement, Left - Toe    Surgeon: Surgeons and Role:     * Wilmer Prajapati DPM - Primary  Anesthesia: MAC   Estimated Blood Loss: 10 mL from 3/27/2025 12:04 PM to 3/27/2025 12:43 PM      Drains: None  Specimens:   ID Type Source Tests Collected by Time Destination   A : Left great toe Tissue Toe, Left ANAEROBIC BACTERIAL CULTURE ROUTINE, GRAM STAIN, AEROBIC BACTERIAL CULTURE ROUTINE Wilmer Prajapati DPM 3/27/2025 12:21 PM      Findings:   Wound measurements postdebridement: 6 cm x 7 cm surface area.  Depth considered to be too deep fascia and capsular tissue.  1 cm in depth.  2 cm undermining distally .  There was abscess found deep to the 2 areas of ulceration.  Malodor.  Probing to a hard endpoint through both of the deeper aspects of the wound.  Complications: None.  Implants: 1/4 inch iodoform packing dressing    Plan:  Will monitor the demarcation of soft tissues to determine if additional excisional debridement is needed versus amputation of the left great toe.    If the toe survives, healing might be lengthy and he will still have a neuropathic toe with associated hallux limitus.  This puts him at risk for future ulceration.  Therefore, primary amputation is a reasonable option either way.    Continue IV Zosyn and IV vancomycin    Deep tissue cultures pending - aerobic and anaerobic    Ambulation in short CAM walker    Podiatry will continue to follow    Wilmer Prajapati DPM, MARCELINA, MS  M [facility] Department of Podiatry/Foot & Ankle Surgery

## 2025-03-27 NOTE — PLAN OF CARE
"Pt alert and orientedx4. VSS. RA. Assist of x1-2, with GB and sera steady. Denied pain. NPO since midnight. For possible surgical intervention of the left great toe today. PIV SL.blood sugar checks, at 2am was 262.  Problem: Adult Inpatient Plan of Care  Goal: Plan of Care Review  Description: The Plan of Care Review/Shift note should be completed every shift.  The Outcome Evaluation is a brief statement about your assessment that the patient is improving, declining, or no change.  This information will be displayed automatically on your shiftnote.  Outcome: Progressing  Flowsheets (Taken 3/27/2025 0651)  Outcome Evaluation: pt on NPO for possible surgical intervention today.  Plan of Care Reviewed With: patient  Overall Patient Progress: improving  Goal: Patient-Specific Goal (Individualized)  Description: You can add care plan individualizations to a care plan. Examples of Individualization might be:  \"Parent requests to be called daily at 9am for status\", \"I have a hard time hearing out of my right ear\", or \"Do not touch me to wake me up as it startlesme\".  Outcome: Progressing  Goal: Absence of Hospital-Acquired Illness or Injury  Outcome: Progressing  Intervention: Identify and Manage Fall Risk  Recent Flowsheet Documentation  Taken 3/27/2025 0127 by Jaylene Hassan RN  Safety Promotion/Fall Prevention:   activity supervised   nonskid shoes/slippers when out of bed  Intervention: Prevent Skin Injury  Recent Flowsheet Documentation  Taken 3/27/2025 0127 by Jaylene Hassan RN  Body Position: position changed independently  Taken 3/27/2025 0100 by Jaylene Hassan RN  Body Position: position changed independently  Intervention: Prevent Infection  Recent Flowsheet Documentation  Taken 3/27/2025 0127 by Jaylene Hassan, ELMA  Infection Prevention: rest/sleep promoted  Goal: Optimal Comfort and Wellbeing  Outcome: Progressing  Goal: Readiness for Transition of Care  Outcome: Progressing     Problem: Delirium  Goal: Optimal " Coping  Outcome: Progressing  Goal: Improved Behavioral Control  Outcome: Progressing  Intervention: Minimize Safety Risk  Recent Flowsheet Documentation  Taken 3/27/2025 0127 by Jaylene Hassan RN  Enhanced Safety Measures: review medications for side effects with activity  Goal: Improved Attention and Thought Clarity  Outcome: Progressing  Goal: Improved Sleep  Outcome: Progressing   Goal Outcome Evaluation:      Plan of Care Reviewed With: patient    Overall Patient Progress: improvingOverall Patient Progress: improving    Outcome Evaluation: pt on NPO for possible surgical intervention today.

## 2025-03-27 NOTE — ANESTHESIA PREPROCEDURE EVALUATION
Anesthesia Pre-Procedure Evaluation    Patient: Kaden Cain   MRN: [id number] : 1950        Procedure : Procedure(s):  Left foot first ray resection, left foot debridement          No past medical history on file.   Past Surgical History:   Procedure Laterality Date    CV CORONARY ANGIOGRAM N/A 2024    Procedure: Coronary Angiogram;  Surgeon: Salomon Carrillo MD;  Location:  [facility] C[facility] LAB    CV INTRAVASULAR ULTRASOUND N/A 2024    Procedure: Intravascular Ultrasound;  Surgeon: Salomon Carrillo MD;  Location:  [facility] CARDI[facility] CATH LAB    CV PCI N/A 2024    Procedure: Percutaneous Coronary Intervention;  Surgeon: Salomon Carrillo MD;  Location:  HE[facility] CARDIAC CATH LAB      No Known Allergies   Social History     Tobacco Use    Smoking status: Never    Smokeless tobacco: Never   Substance Use Topics    Alcohol use: Not Currently     Comment: not since       Wt Readings from Last 1 Encounters:   25 88.9 kg (195 lb 15.8 oz)        Anesthesia Evaluation            ROS/MED HX  ENT/Pulmonary:       Neurologic:       Cardiovascular:     (+)  - -  CAD -  - stent-                                      METS/Exercise Tolerance: >4 METS    Hematologic:       Musculoskeletal:       GI/Hepatic:       Renal/Genitourinary:       Endo:     (+) type I DM,                     Psychiatric/Substance Use:       Infectious Disease:       Malignancy:       Other:            Physical Exam    Airway        Mallampati: II   TM distance: > 3 FB   Neck ROM: full   Mouth opening: > 3 cm    Respiratory Devices and Support         Dental       (+) Minor Abnormalities - some fillings, tiny chips      Cardiovascular          Rhythm and rate: regular     Pulmonary           breath sounds clear to auscultation           OUTSIDE LABS:  CBC:   Lab Results   Component Value Date    WBC 10.7 2025    WBC 15.0 (H) 2025    HGB 11.8 (L) 2025    HGB 14.3 2025    HCT 34.6 (L) 2025     "HCT 40.5 03/26/2025     03/27/2025     03/26/2025     BMP:   Lab Results   Component Value Date     (L) 03/27/2025     (L) 03/26/2025    POTASSIUM 4.2 03/27/2025    POTASSIUM 4.6 03/26/2025    CHLORIDE 101 03/27/2025    CHLORIDE 92 (L) 03/26/2025    CO2 23 03/27/2025    CO2 23 03/26/2025    BUN 12.4 03/27/2025    BUN 13.9 03/26/2025    CR 1.11 03/27/2025    CR 1.16 03/26/2025     (H) 03/27/2025     (H) 03/27/2025     COAGS:   Lab Results   Component Value Date    INR 1.28 (H) 03/27/2025     POC: No results found for: \"BGM\", \"HCG\", \"HCGS\"  HEPATIC:   Lab Results   Component Value Date    ALBUMIN 4.7 03/26/2025    PROTTOTAL 8.7 (H) 03/26/2025    ALT 31 03/26/2025    AST 31 03/26/2025    ALKPHOS 110 03/26/2025    BILITOTAL 1.0 03/26/2025     OTHER:   Lab Results   Component Value Date    LACT 1.9 03/26/2025    A1C 8.4 (H) 03/26/2025    BIRGIT 8.5 (L) 03/27/2025    PHOS 3.7 05/25/2024    MAG 2.1 08/28/2024    TSH 1.24 08/28/2024       Anesthesia Plan    ASA Status:  4    NPO Status:  NPO Appropriate    Anesthesia Type: MAC.     - Reason for MAC: immobility needed   Induction: Intravenous, Propofol.   Maintenance: TIVA.        Consents    Anesthesia Plan(s) and associated risks, benefits, and realistic alternatives discussed. Questions answered and patient/representative(s) expressed understanding.     - Discussed:     - Discussed with:  Patient       - Patient is DNR/DNI Status: No          Postoperative Care    Pain management: IV analgesics.   PONV prophylaxis: Ondansetron (or other 5HT-3), Dexamethasone or Solumedrol     Comments:               Kymberly Gibbs MD    Clinically Significant Risk Factors Present on Admission         # Hyponatremia: Lowest Na = 130 mmol/L in last 2 days, will monitor as appropriate  # Hypochloremia: Lowest Cl = 92 mmol/L in last 2 days, will monitor as appropriate  # Hypocalcemia: Lowest Ca = 8.5 mg/dL in last 2 days, will monitor and replace as " "appropriate      # Coagulation Defect: INR = 1.28 (Ref range: 0.85 - 1.15) and/or PTT = N/A, will monitor for bleeding  # Drug Induced Platelet Defect: home medication list includes an antiplatelet medication   # Hypertension: Home medication list includes antihypertensive(s)          # DMII: A1C = 8.4 % (Ref range: <5.7 %) within past 6 months    # Overweight: Estimated body mass index is 25.86 kg/m  as calculated from the following:    Height as of this encounter: 1.854 m (6' 1\").    Weight as of this encounter: 88.9 kg (195 lb 15.8 oz).                "

## 2025-03-27 NOTE — ANESTHESIA CARE TRANSFER NOTE
Patient: Kaden Cain    Procedure: Procedure(s):  left foot excisional irrigation and debridement       Diagnosis: Left foot infection [L08.9]  Diagnosis Additional Information: No value filed.    Anesthesia Type:   MAC     Note:    Oropharynx: oropharynx clear of all foreign objects and spontaneously breathing  Level of Consciousness: awake  Oxygen Supplementation: room air    Independent Airway: airway patency satisfactory and stable  Dentition: dentition unchanged  Vital Signs Stable: post-procedure vital signs reviewed and stable  Report to RN Given: handoff report given  Patient transferred to: PACU    Handoff Report: Identifed the Patient, Identified the Reponsible Provider, Reviewed the pertinent medical history, Discussed the surgical course, Reviewed Intra-OP anesthesia mangement and issues during anesthesia, Set expectations for post-procedure period and Allowed opportunity for questions and acknowledgement of understanding      Vitals:  Vitals Value Taken Time   /63 03/27/25 1245   Temp     Pulse 68 03/27/25 1245   Resp     SpO2 99 % 03/27/25 1247   Vitals shown include unfiled device data.    Electronically Signed By: KATE Modoy CRNA  March 27, 2025  12:48 PM

## 2025-03-27 NOTE — CONSULTS
[place] [facility]FOOT [facility] ANKLE SURGERY CONSULTATION NOTE      ASSESSMENT:  Pleasant 75 year-old male with a past medical history significant for type 2 diabetes, peripheral neuropathy, CAD with stents x 4, hypertension, hyperlipidemia admitted for a diabetic left foot infection largely involving the great toe.    There was concern for osteomyelitis yet imaging including x-ray and MRI not supporting this currently.    MRI showing possible abscess    MEDICAL DECISION MAKING:   Significant diabetic left foot infection in the setting of type 2 diabetes, peripheral neuropathy, and hallux limitus.  Systemically stable    Kaden is scheduled for surgical intervention at 11:40 AM today.  Given the MRI findings, I am not recommending amputation at this time.  We will plan on excisional debridement and washout.  Deep tissue cultures will be harvested.    Will then monitor and see how the soft tissues demarcate and if infection resolves.    He reports longer-term issues with his toe involving callusing.  I explained how the hallux limitus, peripheral neuropathy and normal gait cycle contribute to the callusing and likely underlying ulceration.    I explained that if the toe survives, he will still have challenges with it.  He will be at risk for future ulceration and infection.  Currently there is risk of losing the toe.    There might be future elective surgery options to help offload this part of the toe.    If after excisional debridement and washout the toe worsens or does not improve, amputation is reasonable.    I anticipate 2 to 3 days of IV antibiotics and monitoring after surgery.  Empiric antibiotic coverage until deep tissue culture results available.    INR 1.28  WBC trending down from 15-10.7  Hemoglobin A1c 8.4%    Wilmer Prajapati DPM, MARCELINA, MS  M [facility] Department of Podiatry/Foot & Ankle Surgery    Disclaimer: This note consists of symbols derived from keyboarding, dictation and/or voice recognition  "software. As a result, there may be errors in the script that have gone undetected. Please consider this when interpreting information found in this chart.    _______________________________________________________________________________________________________________________________________________    CHIEF COMPLAINT:      I was asked by Kal Alberto MD  to evaluate this patient, Kaden Cain, for a diabetic left foot infection with concern for osteomyelitis.    PATIENT HISTORY:  Kaden Cain is a 75 year old male with a past medical history significant for type 2 diabetes, peripheral neuropathy, CAD with stents x 4, hypertension, hyperlipidemia who presented to the emergency department yesterday due to progressive swelling and redness of the left great toe, skin changes on the toe and ascending redness.  No nausea, vomiting, fever, chills.      PAST MEDICAL HISTORY: No past medical history on file.     PAST SURGICAL HISTORY:   Past Surgical History:   Procedure Laterality Date    CV CORONARY ANGIOGRAM N/A 5/25/2024    Procedure: Coronary Angiogram;  Surgeon: Salomon Carrillo MD;  Location: [facility] CARDIAC CATH LAB    CV INTRAVASULAR ULTRASOUND N/A 5/25/2024    Procedure: Intravascular Ultrasound;  Surgeon: Salomon Carrillo MD;  Location: [facility] CARDIAC CATH LAB    CV PCI N/A 5/25/2024    Procedure: Percutaneous Coronary Intervention;  Surgeon: Salomon Carrillo MD;  Location: [facility] CARDIAC CATH LAB        MEDICATIONS:  Reviewed in Epic. Current IV Zosyn and vancomycin.     ALLERGIES:  No Known Allergies     EXAM:  Vitals: BP (!) 146/78   Pulse 79   Temp 98.1  F (36.7  C) (Oral)   Resp 16   Ht 1.854 m (6' 1\")   Wt 88.9 kg (195 lb 15.8 oz)   SpO2 98%   BMI 25.86 kg/m    BMI= Body mass index is 25.86 kg/m .    Derm: As seen in photograph above, there is significant edema involving the left great toe and forefoot, with fairly intense erythema to the midfoot level and then lightening.  " Prolific hyperkeratotic eschar on the plantar medial aspect of the toe with a patch of hemorrhagic skin proximal to this.     Vasc:      Pedal pulses are palpable for the dorsalis pedis posterior tibial artery, bilateral foot.  Capillary fill time </= 3 seconds  Pedal skin appears well-perfused  Neuro:      Light touch sensation grossly diminished to all sensory nerve distributions, bilateral foot.  No apparent spastic contractures or other deformity secondary to neurologic compromise.  MSK:      Notable for hallux limitus, left.  Bilateral lower extremity muscle strength presents is normal.  No gross deformities  Adequate ankle and subtalar joint range of motion  Calf:    Neg for redness, swelling or tenderness    Imaging:   EXAM: MR FOOT LEFT W/O CONTRAST  LOCATION: [facility]  DATE: 3/26/2025     INDICATION: Left foot infection, evaluate for abscess or osteomyelitis.  COMPARISON: 3/26/2025 radiographs.  TECHNIQUE: Unenhanced.     FINDINGS:      JOINTS AND BONES:   -No fracture or osteomyelitis. Advanced degenerative changes at the first MTP joint with mild hallux valgus. Mild degenerative changes at the IP joint of the great toe. Mild degenerative changes at the third and fifth TMT joints. No joint effusion.   Normal tibial and fibular sesamoids.     TENDONS:   -No tendon tear, tendinopathy, or tenosynovitis.      LIGAMENTS:   -Lisfranc ligament: Intact. No subluxation.     MUSCLES AND SOFT TISSUES:   -Marked global atrophy of the intrinsic musculature of the foot. Small soft tissue ulcer along the plantar/medial aspect of the great toe just distal to the IP joint. There is a moderate-sized, confluent fluid collection along the medial aspect of the   proximal phalanx of the great toe worrisome for an abscess as seen series 8001 image 18 and series 75128 image 16. There is moderate adjacent poorly defined cellulitis in the great toe as well as throughout the dorsum of the foot.                                                                       IMPRESSION:  1.  No definite evidence of osteomyelitis.     2.  Soft tissue ulcer along the plantar/medial aspect of the great toe just distal to the IP joint with a possible abscess along the medial margin of the proximal phalanx.     3.  Advanced degenerative changes at the first MTP joint.     4.  Marked global atrophy of the intrinsic musculature of the foot.    EXAM: XR FOOT LEFT G/E 3 VIEWS  LOCATION: [facility]  DATE: 3/26/2025     INDICATION: Foot infection.  COMPARISON: None.                                                                      IMPRESSION: Soft tissue irregularity and likely ulceration/wound plantar medial great toe at the level of the distal phalangeal base near the IP joint. Severe great toe soft tissue swelling. No convincing radiographic evidence for acute osteomyelitis of   the great toe. Left forefoot MRI could further assess.     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 No acute displaced left foot fracture or dislocation. Degenerative changes scattered in the left midfoot.    LABS:   Lab Results   Component Value Date    A1C 8.4 03/26/2025    A1C 9.0 05/24/2024       Lab Results   Component Value Date    INR 1.28 03/27/2025       Lab Results   Component Value Date    WBC 10.7 03/27/2025     Lab Results   Component Value Date    HGB 11.8 03/27/2025     Lab Results   Component Value Date     03/27/2025       All cultures:  Recent Labs   Lab 03/26/25  1508   CULTURE No growth after 12 hours  No growth after 12 hours

## 2025-03-27 NOTE — PROGRESS NOTES
[facility]    Medicine Progress Note - Hospitalist Service    Date of Admission:  3/26/2025    Assessment & Plan   Kaden Cain is a 75 year old male who who has a PMHx significant for poorly controlled non-insulin dependent type 2 diabetes, coronary disease with stents x 4 5/2024, hypertension, hyperlipidemia, and peripheral neuropathy, who was admitted 3/26/2025 with a left great toe infection.      He is also describes some orthopneic type symptoms that is been going on for some time as he is on a beta-blocker as well.  He denies any problems with bleeding or shortness of breath.    ED work up significant for hyponatremia of 130, glucose 354, normal lactic acid and WBC of 15.0.   X-ray and MRI of the left foot are negative for osteomyelitis but concerning for possible abscess.    He was placed on Zosyn and vancomycin empirically.    Podiatry consulted in ED, planning for OR intervention today.    Left great toe infection  Possible abscess of L great toe  Patient is presenting with a significant infection of left great toe with erythema going up his foot. Chronic issues with callus formation, developed swelling 3/25 and ulceration 3/26. Notes pain up his calf as well.  Pt denies fever but had temp of 101.2 overnight.   XR and MRI of the left foot negative for osteomyelitis.  - Podiatry consulted. Recommend I&D today, no plan for amputation at this time  - NPO for OR today  - PRN Tylenol and oxycodone.    - continue IV Zosyn and vancomycin for now. Podiatry recommends 2-3 days of abx in the hospital after surgery  - leukocytosis resolved; 15-->10.7  - follow blood cultures, negative to date  - PT consult after surgery  - needs improved glycemic control  - hold ASA today, resume after surgery  - continue Plavix  - gabapentin started on admission for neuropathy      Type 2 diabetes with peripheral neuropathy  HgbA1c 8.4 on admission. Managed with glipizide and metformin. Recently seen by  endocrinology, added Farxiga but not covered by insurance and not affordable.   - NPO today for surgery, hold PO meds today, resume tomorrow AM  - start 10 units Lantus today given NPO status. Pharmacy recommends 16-20 units Lantus daily  - continue medium intensity sliding scale insulin   - hypoglycemia protocol  - Pharmacy liaison consult to assess for alternatives to Farxiga that are covered by insurance.   - gabapentin started on admission, 100 mg TID. Titrate as an outpatient pending response      Coronary artery disease  S/p PCI with JOSE x4 5/2024  Presented with MI in 5/2024 and had an angiogram,  PCI with JOSE x4 to the prox-mid LAD, distal LAD, proximal circumflex and mid-circumflex. There is residual disease in the rPDA.    Pt reports sensation of total body weakness at times while standing. Sounds like this started prior to toe infection  - Continue on his home Plavix and beta blocker.    - hold ASA this AM, resume after surgery  - check orthostatics  - recommend slow transitions from seated to standing and standing to walking  - PT consult     Hyponatremia  Sodium is 130.  Was given IV fluids in the emergency room.    - Na 134 this AM  - follow labs      Essential hypertension   - Continue on his home lisinopril and metoprolol with parameters     Hyperlipidemia   - Continue on home crestor        Diet: NPO for Procedure/Surgery per Anesthesia Guidelines Except for: Meds; Clear liquids before procedure/surgery: ADULT (Age GREATER than or Equal to 18 years) - Clear liquids 2 hours before procedure/surgery    DVT Prophylaxis: Pneumatic Compression Devices  Stephens Catheter: Not present  Lines: None     Cardiac Monitoring: None  Code Status: No CPR- Pre-arrest intubation OK      Clinically Significant Risk Factors Present on Admission         # Hyponatremia: Lowest Na = 130 mmol/L in last 2 days, will monitor as appropriate  # Hypochloremia: Lowest Cl = 92 mmol/L in last 2 days, will monitor as appropriate  #  "Hypocalcemia: Lowest Ca = 8.5 mg/dL in last 2 days, will monitor and replace as appropriate      # Coagulation Defect: INR = 1.28 (Ref range: 0.85 - 1.15) and/or PTT = N/A, will monitor for bleeding  # Drug Induced Platelet Defect: home medication list includes an antiplatelet medication   # Hypertension: Home medication list includes antihypertensive(s)          # DMII: A1C = 8.4 % (Ref range: <5.7 %) within past 6 months    # Overweight: Estimated body mass index is 25.86 kg/m  as calculated from the following:    Height as of this encounter: 1.854 m (6' 1\").    Weight as of this encounter: 88.9 kg (195 lb 15.8 oz).              Social Drivers of Health     Received from [email or web address]    Social Connections          Disposition Plan     Medically Ready for Discharge: Anticipated in 2-4 Days           The patient's care was discussed with the Bedside Nurse and Patient.    Lara Robertson PA-C  Hospitalist Service  [facility]  Securely message with [employer] (more info)  Text page via [employer] Paging/Directory   ______________________________________________________________________    Interval History   Feeling well, denies significant pain. Denies fever, chills, nausea or vomiting.   Notes episodes of total body weakness at times when standing    Physical Exam   Vital Signs: Temp: 98.1  F (36.7  C) Temp src: Oral BP: (!) 146/78 Pulse: 79   Resp: 16 SpO2: 98 % O2 Device: None (Room air)    Weight: 195 lbs 15.82 oz    GENERAL:  Comfortable.  PSYCH: pleasant, oriented, No acute distress.  HEENT:  Atraumatic, normocephalic. Normal conjunctiva, normal hearing, and oropharynx is normal.  NECK:  Supple, no neck vein distention  HEART:  Normal S1, S2 with no murmur, no pericardial rub, gallops or S3 or S4.  LUNGS:  Clear to auscultation, normal Respiratory effort. No wheezing, rales or ronchi.  GI:  Soft, normal bowel sounds. Non-tender, non distended.   EXTREMITIES:  " significant swelling and ulcer to great toe with significant callus formation. Mild L foot edema, +2 pulses bilateral and equal.  SKIN:  Dry to touch, No rash, wound or ulcerations.  NEUROLOGIC:  grossly intact    Medical Decision Making       60 MINUTES SPENT BY ME on the date of service doing chart review, history, exam, documentation & further activities per the note.      Data     I have personally reviewed the following data over the past 24 hrs:    10.7  \   11.8 (L)   / 202     134 (L) 101 12.4 /  257 (H)   4.2 23 1.11 \     ALT: 31 AST: 31 AP: 110 TBILI: 1.0   ALB: 4.7 TOT PROTEIN: 8.7 (H) LIPASE: N/A     TSH: N/A T4: N/A A1C: 8.4 (H)     Procal: N/A CRP: N/A Lactic Acid: 1.9       INR:  1.28 (H) PTT:  N/A   D-dimer:  N/A Fibrinogen:  N/A       Imaging results reviewed over the past 24 hrs:   Recent Results (from the past 24 hours)   XR Foot Left G/E 3 Views    Narrative    EXAM: XR FOOT LEFT G/E 3 VIEWS  LOCATION: [facility]  DATE: 3/26/2025    INDICATION: Foot infection.  COMPARISON: None.      Impression    IMPRESSION: Soft tissue irregularity and likely ulceration/wound plantar medial great toe at the level of the distal phalangeal base near the IP joint. Severe great toe soft tissue swelling. No convincing radiographic evidence for acute osteomyelitis of   the great toe. Left forefoot MRI could further assess.    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 No acute displaced left foot fracture or dislocation. Degenerative changes scattered in the left midfoot.   MR Foot Left w/o Contrast    Narrative    EXAM: MR FOOT LEFT W/O CONTRAST  LOCATION: [facility]  DATE: 3/26/2025    INDICATION: Left foot infection, evaluate for abscess or osteomyelitis.  COMPARISON: 3/26/2025 radiographs.  TECHNIQUE: Unenhanced.    FINDINGS:     JOINTS AND BONES:   -No fracture or osteomyelitis. Advanced degenerative changes at the  first MTP joint with mild hallux valgus. Mild degenerative changes at the IP joint of the great toe. Mild degenerative changes at the third and fifth TMT joints. No joint effusion.   Normal tibial and fibular sesamoids.    TENDONS:   -No tendon tear, tendinopathy, or tenosynovitis.     LIGAMENTS:   -Lisfranc ligament: Intact. No subluxation.    MUSCLES AND SOFT TISSUES:   -Marked global atrophy of the intrinsic musculature of the foot. Small soft tissue ulcer along the plantar/medial aspect of the great toe just distal to the IP joint. There is a moderate-sized, confluent fluid collection along the medial aspect of the   proximal phalanx of the great toe worrisome for an abscess as seen series 8001 image 18 and series 16734 image 16. There is moderate adjacent poorly defined cellulitis in the great toe as well as throughout the dorsum of the foot.      Impression    IMPRESSION:  1.  No definite evidence of osteomyelitis.    2.  Soft tissue ulcer along the plantar/medial aspect of the great toe just distal to the IP joint with a possible abscess along the medial margin of the proximal phalanx.    3.  Advanced degenerative changes at the first MTP joint.    4.  Marked global atrophy of the intrinsic musculature of the foot.

## 2025-03-27 NOTE — PROGRESS NOTES
Patient fit with short cam boot.  I removed it and left with patient once fit.  I advised that I dont want him to ambulate with orthosis until treated by P.T. to ensure safety when using orthosis as prescribed.  Physical, verbal instruction given.  Contact information given.  Kye SALDIVAR.

## 2025-03-27 NOTE — PLAN OF CARE
"VSS on RA, denies pain, A/Ox3, on capno. Tolerated surgery well. Left foot is wrapped in guaze and ace wrap. Bed rest the rest of day. Getting IV abx. Fitted for boot.     Goal Outcome Evaluation:      Plan of Care Reviewed With: patient, spouse          Outcome Evaluation: VSS on RA no pain    Problem: Adult Inpatient Plan of Care  Goal: Plan of Care Review  Description: The Plan of Care Review/Shift note should be completed every shift.  The Outcome Evaluation is a brief statement about your assessment that the patient is improving, declining, or no change.  This information will be displayed automatically on your shiftnote.  3/27/2025 1511 by Trudy Brooke RN  Outcome: Progressing  Flowsheets (Taken 3/27/2025 1511)  Outcome Evaluation: VSS on RA no pain  Plan of Care Reviewed With:   patient   spouse  3/27/2025 1511 by Trudy Brooke RN  Outcome: Progressing  Flowsheets (Taken 3/27/2025 1511)  Outcome Evaluation: VSS on RA no pain  Plan of Care Reviewed With:   patient   spouse  Goal: Patient-Specific Goal (Individualized)  Description: You can add care plan individualizations to a care plan. Examples of Individualization might be:  \"Parent requests to be called daily at 9am for status\", \"I have a hard time hearing out of my right ear\", or \"Do not touch me to wake me up as it startlesme\".  3/27/2025 1511 by Trudy Brooke RN  Outcome: Progressing  3/27/2025 1511 by Trudy Brooke RN  Outcome: Progressing  Goal: Absence of Hospital-Acquired Illness or Injury  3/27/2025 1511 by Trudy Brooke RN  Outcome: Progressing  3/27/2025 1511 by Trudy Brooke RN  Outcome: Progressing  Intervention: Identify and Manage Fall Risk  Recent Flowsheet Documentation  Taken 3/27/2025 0800 by Trudy Brooke RN  Safety Promotion/Fall Prevention: safety round/check completed  Intervention: Prevent Infection  Recent Flowsheet Documentation  Taken 3/27/2025 0800 by Trudy Brooke RN  Infection Prevention: " rest/sleep promoted  Goal: Optimal Comfort and Wellbeing  3/27/2025 1511 by Trudy Brooke RN  Outcome: Progressing  3/27/2025 1511 by Trudy Brooke RN  Outcome: Progressing  Goal: Readiness for Transition of Care  3/27/2025 1511 by Trudy Brooke RN  Outcome: Progressing  3/27/2025 1511 by Trudy Brooke RN  Outcome: Progressing     Problem: Delirium  Goal: Optimal Coping  3/27/2025 1511 by Trudy Brooke RN  Outcome: Progressing  3/27/2025 1511 by Trudy Brooke RN  Outcome: Progressing  Goal: Improved Behavioral Control  3/27/2025 1511 by Trudy Brooke RN  Outcome: Progressing  3/27/2025 1511 by Trudy Brooke RN  Outcome: Progressing  Intervention: Minimize Safety Risk  Recent Flowsheet Documentation  Taken 3/27/2025 0800 by Trudy Brooke RN  Enhanced Safety Measures: review medications for side effects with activity  Goal: Improved Attention and Thought Clarity  3/27/2025 1511 by Trudy Brooke RN  Outcome: Progressing  3/27/2025 1511 by Trudy Brooke RN  Outcome: Progressing  Goal: Improved Sleep  3/27/2025 1511 by Trudy Brooke RN  Outcome: Progressing  3/27/2025 1511 by Trudy Brooke RN  Outcome: Progressing     Problem: Infection  Goal: Absence of Infection Signs and Symptoms  3/27/2025 1511 by Trudy Brooke RN  Outcome: Progressing  3/27/2025 1511 by Trudy Brooke RN  Outcome: Progressing     Problem: Comorbidity Management  Goal: Blood Glucose Levels Within Targeted Range  3/27/2025 1511 by Trudy Brooke RN  Outcome: Progressing  3/27/2025 1511 by Trudy Brooke RN  Outcome: Progressing  Intervention: Monitor and Manage Glycemia  Recent Flowsheet Documentation  Taken 3/27/2025 0800 by Trudy Brooke RN  Medication Review/Management: medications reviewed  Goal: Blood Pressure in Desired Range  3/27/2025 1511 by Trudy Brooke, RN  Outcome: Progressing  3/27/2025 1511 by Trudy Brooke, RN  Outcome: Progressing  Intervention: Maintain Blood  Pressure Management  Recent Flowsheet Documentation  Taken 3/27/2025 0800 by Trudy Brooke RN  Medication Review/Management: medications reviewed

## 2025-03-27 NOTE — PHARMACY-VANCOMYCIN DOSING SERVICE
"Pharmacy Vancomycin Initial Note  Date of Service 2025  Patient's  1950  75 year old, male    Indication: Osteomyelitis    Current estimated CrCl = Estimated Creatinine Clearance: 70.3 mL/min (based on SCr of 1.16 mg/dL).    Creatinine for last 3 days  3/26/2025:  3:08 PM Creatinine 1.16 mg/dL    Recent Vancomycin Level(s) for last 3 days  No results found for requested labs within last 3 days.      Vancomycin IV Administrations (past 72 hours)                     vancomycin (VANCOCIN) 1,750 mg in 0.9% NaCl 517.5 mL intermittent infusion (mg) 1,750 mg New Bag 25 1714                    Nephrotoxins and other renal medications (From now, onward)      Start     Dose/Rate Route Frequency Ordered Stop    25 1200  vancomycin (VANCOCIN) 1,500 mg in 0.9% NaCl 265 mL intermittent infusion         1,500 mg  over 90 Minutes Intravenous EVERY 24 HOURS 25 1914      25 0800  lisinopril (ZESTRIL) tablet 40 mg        Note to Pharmacy: PTA Sig:Take 1 tablet (40 mg) by mouth daily.      40 mg Oral DAILY 25 1625      25 2200  piperacillin-tazobactam (ZOSYN) 3.375 g vial to attach to  mL bag        Note to Pharmacy: For [facility], [facility] and [facility]: For Zosyn-naive patients, use the \"Zosyn initial dose + extended infusion\" order panel.    3.375 g  over 30 Minutes Intravenous EVERY 6 HOURS 25 1625              Contrast Orders - past 72 hours (72h ago, onward)      None            [employer] Prediction of Planned Initial Vancomycin Regimen  Loading dose: 1750 mg  Regimen: 1500 mg IV every 24 hours.  Start time: 17:14 on 2025  Exposure target: AUC24 (range)400-600 mg/L.hr   AUC24,ss: 498 mg/L.hr  Probability of AUC24 > 400: 74 %  Ctrough,ss: 14.6 mg/L  Probability of Ctrough,ss > 20: 23 %  Probability of nephrotoxicity (Lodise MATTHEW ): 10 %          Plan:  Start vancomycin  1500 mg IV q24h.   Vancomycin monitoring method: AUC  Vancomycin therapeutic monitoring goal: 400-600 " mg*h/L  Pharmacy will check vancomycin levels as appropriate in 1-3 Days.    Serum creatinine levels will be ordered daily for the first week of therapy and at least twice weekly for subsequent weeks.      Melissa Howe RPH

## 2025-03-27 NOTE — ANESTHESIA POSTPROCEDURE EVALUATION
Patient: Kaden Cain    Procedure: Procedure(s):  left foot excisional irrigation and debridement       Anesthesia Type:  MAC    Note:  Disposition: Outpatient   Postop Pain Control: Uneventful            Sign Out: Well controlled pain   PONV: No   Neuro/Psych: Uneventful            Sign Out: Acceptable/Baseline neuro status   Airway/Respiratory: Uneventful            Sign Out: Acceptable/Baseline resp. status   CV/Hemodynamics: Uneventful            Sign Out: Acceptable CV status; No obvious hypovolemia; No obvious fluid overload   Other NRE: NONE   DID A NON-ROUTINE EVENT OCCUR?            Last vitals:  Vitals Value Taken Time   /85 03/27/25 1316   Temp 96.8  F (36  C) 03/27/25 1324   Pulse 80 03/27/25 1316   Resp     SpO2 98 % 03/27/25 1324   Vitals shown include unfiled device data.    Electronically Signed By: Kymberly Gibbs MD  March 27, 2025  1:25 PM

## 2025-03-27 NOTE — CARE PLAN
"PRIMARY DIAGNOSIS: SOFT TISSUE INFECTIONS/left great toe   OUTPATIENT/OBSERVATION GOALS TO BE MET BEFORE DISCHARGE:  Vitals sign stable or return to baseline: Yes    Tolerating oral antibiotics or has home infusion set up if applicable:  IV ABT    Pain status: Pain free.    Return to near baseline physical activity: Yes    Discharge Planner Nurse   Safe discharge environment identified: Yes  Barriers to discharge: Yes       Entered by: Naeem Beverly RN 03/26/2025      Please review provider order for any additional goals.     Nurse to notify provider when observation goals have been met and patient is ready for discharge.    Alert and oriented x 4,VSS on RA,denies pain,regular diet tolerated well,PIV SL,IV Vancomycin and IV Zosyn is given,NPO after midnight,AC/HS BG     BP (!) 176/94 (BP Location: Right arm)   Pulse 92   Temp 99.4  F (37.4  C) (Oral)   Resp 18   Ht 1.854 m (6' 1\")   Wt 88.9 kg (195 lb 15.8 oz)   SpO2 95%   BMI 25.86 kg/m      "

## 2025-03-27 NOTE — CARE PLAN
ROOM # 208-1    Living Situation (if not independent, order SW consult):Home with spouse   Facility name:  : Spouse -[facility]    Activity level at baseline: IND   Activity level on admit: SBA    Who will be transporting you at discharge:     Patient registered to observation; given Patient Bill of Rights; given the opportunity to ask questions about observation status and their plan of care.  Patient has been oriented to the observation room, bathroom and call light is in place.    Discussed discharge goals and expectations with patient/family.

## 2025-03-28 ENCOUNTER — APPOINTMENT (OUTPATIENT)
Dept: PHYSICAL THERAPY | Facility: CLINIC | Age: 75
DRG: 623 | End: 2025-03-28
Attending: PHYSICIAN ASSISTANT
Payer: MEDICARE

## 2025-03-28 LAB
ANION GAP SERPL CALCULATED.3IONS-SCNC: 9 MMOL/L (ref 7–15)
BASOPHILS # BLD AUTO: 0.1 10E3/UL (ref 0–0.2)
BASOPHILS NFR BLD AUTO: 1 %
BUN SERPL-MCNC: 12.3 MG/DL (ref 8–23)
CALCIUM SERPL-MCNC: 8.4 MG/DL (ref 8.8–10.4)
CHLORIDE SERPL-SCNC: 101 MMOL/L (ref 98–107)
CREAT SERPL-MCNC: 1.12 MG/DL (ref 0.67–1.17)
EGFRCR SERPLBLD CKD-EPI 2021: 69 ML/MIN/1.73M2
EOSINOPHIL # BLD AUTO: 0.2 10E3/UL (ref 0–0.7)
EOSINOPHIL NFR BLD AUTO: 2 %
ERYTHROCYTE [DISTWIDTH] IN BLOOD BY AUTOMATED COUNT: 11.5 % (ref 10–15)
GLUCOSE BLDC GLUCOMTR-MCNC: 166 MG/DL (ref 70–99)
GLUCOSE BLDC GLUCOMTR-MCNC: 190 MG/DL (ref 70–99)
GLUCOSE BLDC GLUCOMTR-MCNC: 192 MG/DL (ref 70–99)
GLUCOSE BLDC GLUCOMTR-MCNC: 292 MG/DL (ref 70–99)
GLUCOSE BLDC GLUCOMTR-MCNC: 332 MG/DL (ref 70–99)
GLUCOSE BLDC GLUCOMTR-MCNC: 363 MG/DL (ref 70–99)
GLUCOSE SERPL-MCNC: 184 MG/DL (ref 70–99)
HCO3 SERPL-SCNC: 25 MMOL/L (ref 22–29)
HCT VFR BLD AUTO: 35.1 % (ref 40–53)
HGB BLD-MCNC: 12.2 G/DL (ref 13.3–17.7)
IMM GRANULOCYTES # BLD: 0 10E3/UL
IMM GRANULOCYTES NFR BLD: 0 %
LYMPHOCYTES # BLD AUTO: 1.6 10E3/UL (ref 0.8–5.3)
LYMPHOCYTES NFR BLD AUTO: 17 %
MCH RBC QN AUTO: 30.8 PG (ref 26.5–33)
MCHC RBC AUTO-ENTMCNC: 34.8 G/DL (ref 31.5–36.5)
MCV RBC AUTO: 89 FL (ref 78–100)
MONOCYTES # BLD AUTO: 1.1 10E3/UL (ref 0–1.3)
MONOCYTES NFR BLD AUTO: 11 %
NEUTROPHILS # BLD AUTO: 6.6 10E3/UL (ref 1.6–8.3)
NEUTROPHILS NFR BLD AUTO: 69 %
NRBC # BLD AUTO: 0 10E3/UL
NRBC BLD AUTO-RTO: 0 /100
PLATELET # BLD AUTO: 230 10E3/UL (ref 150–450)
POTASSIUM SERPL-SCNC: 4.1 MMOL/L (ref 3.4–5.3)
RBC # BLD AUTO: 3.96 10E6/UL (ref 4.4–5.9)
SODIUM SERPL-SCNC: 135 MMOL/L (ref 135–145)
VANCOMYCIN SERPL-MCNC: 5.6 UG/ML
WBC # BLD AUTO: 9.5 10E3/UL (ref 4–11)

## 2025-03-28 PROCEDURE — 250N000013 HC RX MED GY IP 250 OP 250 PS 637: Performed by: PHYSICIAN ASSISTANT

## 2025-03-28 PROCEDURE — 250N000013 HC RX MED GY IP 250 OP 250 PS 637: Performed by: PODIATRIST

## 2025-03-28 PROCEDURE — 120N000001 HC R&B MED SURG/OB

## 2025-03-28 PROCEDURE — 258N000003 HC RX IP 258 OP 636: Performed by: PODIATRIST

## 2025-03-28 PROCEDURE — 97530 THERAPEUTIC ACTIVITIES: CPT | Mod: GP | Performed by: PHYSICAL THERAPIST

## 2025-03-28 PROCEDURE — 36415 COLL VENOUS BLD VENIPUNCTURE: CPT | Performed by: PODIATRIST

## 2025-03-28 PROCEDURE — 80048 BASIC METABOLIC PNL TOTAL CA: CPT | Performed by: NURSE PRACTITIONER

## 2025-03-28 PROCEDURE — 80051 ELECTROLYTE PANEL: CPT | Performed by: NURSE PRACTITIONER

## 2025-03-28 PROCEDURE — 36415 COLL VENOUS BLD VENIPUNCTURE: CPT | Performed by: NURSE PRACTITIONER

## 2025-03-28 PROCEDURE — 99231 SBSQ HOSP IP/OBS SF/LOW 25: CPT | Performed by: PODIATRIST

## 2025-03-28 PROCEDURE — 97161 PT EVAL LOW COMPLEX 20 MIN: CPT | Mod: GP | Performed by: PHYSICAL THERAPIST

## 2025-03-28 PROCEDURE — 85004 AUTOMATED DIFF WBC COUNT: CPT | Performed by: NURSE PRACTITIONER

## 2025-03-28 PROCEDURE — 250N000011 HC RX IP 250 OP 636: Performed by: PODIATRIST

## 2025-03-28 PROCEDURE — 85014 HEMATOCRIT: CPT | Performed by: NURSE PRACTITIONER

## 2025-03-28 PROCEDURE — 99233 SBSQ HOSP IP/OBS HIGH 50: CPT | Performed by: PHYSICIAN ASSISTANT

## 2025-03-28 PROCEDURE — 97116 GAIT TRAINING THERAPY: CPT | Mod: GP | Performed by: PHYSICAL THERAPIST

## 2025-03-28 PROCEDURE — 80202 ASSAY OF VANCOMYCIN: CPT | Performed by: PODIATRIST

## 2025-03-28 RX ADMIN — ASPIRIN 81 MG: 81 TABLET, CHEWABLE ORAL at 09:21

## 2025-03-28 RX ADMIN — GABAPENTIN 100 MG: 100 CAPSULE ORAL at 14:14

## 2025-03-28 RX ADMIN — GABAPENTIN 100 MG: 100 CAPSULE ORAL at 09:20

## 2025-03-28 RX ADMIN — VANCOMYCIN HYDROCHLORIDE 1500 MG: 10 INJECTION, POWDER, LYOPHILIZED, FOR SOLUTION INTRAVENOUS at 12:49

## 2025-03-28 RX ADMIN — METFORMIN HYDROCHLORIDE 1000 MG: 500 TABLET, EXTENDED RELEASE ORAL at 09:20

## 2025-03-28 RX ADMIN — INSULIN GLARGINE 10 UNITS: 100 INJECTION, SOLUTION SUBCUTANEOUS at 11:05

## 2025-03-28 RX ADMIN — PIPERACILLIN AND TAZOBACTAM 4.5 G: 4; .5 INJECTION, POWDER, FOR SOLUTION INTRAVENOUS at 21:31

## 2025-03-28 RX ADMIN — INSULIN ASPART 4 UNITS: 100 INJECTION, SOLUTION INTRAVENOUS; SUBCUTANEOUS at 11:06

## 2025-03-28 RX ADMIN — PIPERACILLIN AND TAZOBACTAM 4.5 G: 4; .5 INJECTION, POWDER, FOR SOLUTION INTRAVENOUS at 16:22

## 2025-03-28 RX ADMIN — INSULIN ASPART 5 UNITS: 100 INJECTION, SOLUTION INTRAVENOUS; SUBCUTANEOUS at 12:46

## 2025-03-28 RX ADMIN — LISINOPRIL 40 MG: 40 TABLET ORAL at 09:21

## 2025-03-28 RX ADMIN — METOPROLOL TARTRATE 25 MG: 25 TABLET, FILM COATED ORAL at 09:19

## 2025-03-28 RX ADMIN — METFORMIN HYDROCHLORIDE 1000 MG: 500 TABLET, EXTENDED RELEASE ORAL at 16:50

## 2025-03-28 RX ADMIN — GABAPENTIN 100 MG: 100 CAPSULE ORAL at 21:04

## 2025-03-28 RX ADMIN — METOPROLOL TARTRATE 25 MG: 25 TABLET, FILM COATED ORAL at 21:04

## 2025-03-28 RX ADMIN — PIPERACILLIN AND TAZOBACTAM 4.5 G: 4; .5 INJECTION, POWDER, FOR SOLUTION INTRAVENOUS at 09:33

## 2025-03-28 RX ADMIN — PIPERACILLIN AND TAZOBACTAM 4.5 G: 4; .5 INJECTION, POWDER, FOR SOLUTION INTRAVENOUS at 05:43

## 2025-03-28 RX ADMIN — GLIPIZIDE 10 MG: 2.5 TABLET, EXTENDED RELEASE ORAL at 09:19

## 2025-03-28 RX ADMIN — CLOPIDOGREL BISULFATE 75 MG: 75 TABLET ORAL at 09:20

## 2025-03-28 RX ADMIN — INSULIN ASPART 4 UNITS: 100 INJECTION, SOLUTION INTRAVENOUS; SUBCUTANEOUS at 16:42

## 2025-03-28 RX ADMIN — ROSUVASTATIN CALCIUM 20 MG: 20 TABLET, FILM COATED ORAL at 21:04

## 2025-03-28 ASSESSMENT — ACTIVITIES OF DAILY LIVING (ADL)
ADLS_ACUITY_SCORE: 35
ADLS_ACUITY_SCORE: 31
ADLS_ACUITY_SCORE: 31
ADLS_ACUITY_SCORE: 33
ADLS_ACUITY_SCORE: 35
ADLS_ACUITY_SCORE: 31
ADLS_ACUITY_SCORE: 35
ADLS_ACUITY_SCORE: 31
ADLS_ACUITY_SCORE: 33
ADLS_ACUITY_SCORE: 31
ADLS_ACUITY_SCORE: 35
ADLS_ACUITY_SCORE: 35
ADLS_ACUITY_SCORE: 31

## 2025-03-28 NOTE — PLAN OF CARE
"Vitals are Temp: 98.3  F (36.8  C) Temp src: Oral BP: 107/45 Pulse: 66   Resp: 18 SpO2: (!) 89 %.  Patient is Alert and Oriented x4. They are 1 person Assist with Gait Belt and Walker.  Pt is a mod CHO diet.  They are denying pain.  Patient is Saline locked. I$D yesterday. Podiatry redressed the wound. Lower left ext elevated on the blue wedge. Plan to possibly discharge back home tomorrow with home assist.       Problem: Adult Inpatient Plan of Care  Goal: Plan of Care Review  Description: The Plan of Care Review/Shift note should be completed every shift.  The Outcome Evaluation is a brief statement about your assessment that the patient is improving, declining, or no change.  This information will be displayed automatically on your shiftnote.  Outcome: Progressing  Goal: Patient-Specific Goal (Individualized)  Description: You can add care plan individualizations to a care plan. Examples of Individualization might be:  \"Parent requests to be called daily at 9am for status\", \"I have a hard time hearing out of my right ear\", or \"Do not touch me to wake me up as it startlesme\".  Outcome: Progressing  Goal: Absence of Hospital-Acquired Illness or Injury  Outcome: Progressing  Intervention: Identify and Manage Fall Risk  Recent Flowsheet Documentation  Taken 3/28/2025 1000 by Margaret Simmons RN  Safety Promotion/Fall Prevention: activity supervised  Intervention: Prevent Skin Injury  Recent Flowsheet Documentation  Taken 3/28/2025 0920 by Margaret Simmons RN  Body Position: position changed independently  Intervention: Prevent and Manage VTE (Venous Thromboembolism) Risk  Recent Flowsheet Documentation  Taken 3/28/2025 1000 by Margaret Simmons RN  VTE Prevention/Management: SCDs off (sequential compression devices)  Intervention: Prevent Infection  Recent Flowsheet Documentation  Taken 3/28/2025 1000 by Margaret Simmons RN  Infection Prevention: rest/sleep promoted  Goal: Optimal Comfort and " Wellbeing  Outcome: Progressing  Goal: Readiness for Transition of Care  Outcome: Progressing

## 2025-03-28 NOTE — PROGRESS NOTES
SPIRITUAL HE[facility] SERVICES Progress Note  Obs 2    Met with patient, his wife, daughter and teenage granddaughter regarding length of stay visit for emotional support.    He was admitted to  3/26/25 due to a diabetic foot infection and other hospital problems.    Patient describes himself as spiritual, but not Judaism.  He addresses his spiritual needs privately.    Patient was receiving good support from his family.    No further concerns.  [facility] remains available upon request.    Rev. Veronika Hurd M.Div.  Staff   Phone  [phone]

## 2025-03-28 NOTE — PROGRESS NOTES
[facility]    Medicine Progress Note - Hospitalist Service    Date of Admission:  3/26/2025    Assessment & Plan   Kaden Cain is a 75 year old male who who has a PMHx significant for poorly controlled non-insulin dependent type 2 diabetes, coronary disease with stents x 4 5/2024, hypertension, hyperlipidemia, and peripheral neuropathy, who was admitted 3/26/2025 with a left great toe infection.      He is also describes some orthopneic type symptoms that is been going on for some time as he is on a beta-blocker as well.  He denies any problems with bleeding or shortness of breath.    ED work up significant for hyponatremia of 130, glucose 354, normal lactic acid and WBC of 15.0.   X-ray and MRI of the left foot are negative for osteomyelitis but concerning for possible abscess.    He was placed on Zosyn and vancomycin empirically.    Podiatry consulted in ED, s/p I&D 3/27.    Left great toe infection  Abscess of L great toe  S/p I&D L great toe 3/27  Patient is presenting with a significant infection of left great toe with erythema going up his foot. Chronic issues with callus formation, developed swelling 3/25 and ulceration 3/26. Notes pain up his calf as well.  Pt denies fever but had temp of 101.2 overnight.   XR and MRI of the left foot negative for osteomyelitis.  - Podiatry consulted. Recommended I&D, well tolerated. Dressing changes per Podiatry, packing place  - operative cultures growing streptococcus dysgalactiae, sensitive to ampicillin, penicillin, vanco and cephalosporins.   - discontinue IV vancomycin   - continue IV Zosyn. Podiatry recommends 2-3 days of abx in the hospital after surgery  - PRN Tylenol and oxycodone.    - leukocytosis resolved; 15-->10.7  - follow blood cultures, negative to date  - Orthotics consulted for post op boot placement  - PT consulted  - needs improved glycemic control  - resume ASA today  - continue Plavix  - gabapentin started on admission for  neuropathy      Type 2 diabetes with peripheral neuropathy  HgbA1c 8.4 on admission. Managed with glipizide and metformin. Recently seen by endocrinology, added Farxiga but not covered by insurance and not affordable.   - resumed PO meds today  - started 10 units Lantus 3/27, glucose significantly elevated today. Increase to 15 units. Pharmacy recommends 16-20 units Lantus daily  - continue medium intensity sliding scale insulin   - hypoglycemia protocol  - Pharmacy liaison consult to assess for alternatives to Farxiga that are covered by insurance, see note. Suspect pt will need insulin on discharge  - per family, pt will have an appt with endocrinology shortly after discharge. Likely cover with Lantus until he can follow up with endo then defer further management to them   - gabapentin started on admission, 100 mg TID. Titrate as an outpatient pending response      Coronary artery disease  S/p PCI with JOSE x4 5/2024  Presented with MI in 5/2024 and had an angiogram,  PCI with JOSE x4 to the prox-mid LAD, distal LAD, proximal circumflex and mid-circumflex. There is residual disease in the rPDA.    Pt reports sensation of total body weakness at times while standing. Sounds like this started prior to toe infection  - Continue on his home Plavix and beta blocker.    - resume ASA this AM  - orthostatics negative  - recommend slow transitions from seated to standing and standing to walking  - PT consult     Hyponatremia  Sodium is 130.  Was given IV fluids in the emergency room.    - Na 134-->135  - follow labs      Essential hypertension   - Continue on his home lisinopril and metoprolol with parameters     Hyperlipidemia   - Continue on home crestor          Diet: Moderate Consistent Carb (60 g CHO per Meal) Diet    DVT Prophylaxis: Pneumatic Compression Devices, on ASA and Plavix  Stephens Catheter: Not present  Lines: None     Cardiac Monitoring: None  Code Status: No CPR- Pre-arrest intubation OK      Clinically  "Significant Risk Factors         # Hyponatremia: Lowest Na = 130 mmol/L in last 2 days, will monitor as appropriate  # Hypochloremia: Lowest Cl = 92 mmol/L in last 2 days, will monitor as appropriate  # Hypocalcemia: Lowest Ca = 8.4 mg/dL in last 2 days, will monitor and replace as appropriate      # Coagulation Defect: INR = 1.28 (Ref range: 0.85 - 1.15) and/or PTT = N/A, will monitor for bleeding              # DMII: A1C = 8.4 % (Ref range: <5.7 %) within past 6 months, PRESENT ON ADMISSION  # Overweight: Estimated body mass index is 25.86 kg/m  as calculated from the following:    Height as of this encounter: 1.854 m (6' 1\").    Weight as of this encounter: 88.9 kg (195 lb 15.8 oz)., PRESENT ON ADMISSION            Social Drivers of Health     Received from [employer] [facility]    Social Connections          Disposition Plan     Medically Ready for Discharge: Anticipated in 2-4 Days           The patient's care was discussed with the Bedside Nurse, Patient, and Patient's Family.    Lara Robertson PA-C  Hospitalist Service  [facility]  Securely message with [employer] (more info)  Text page via [employer] Paging/Directory   ______________________________________________________________________    Interval History   Denies complaints. Worked with PT, learning to use boot. Denies chest pain, SOB, nausea or vomiting. Tolerating oral intake    Physical Exam   Vital Signs: Temp: 97.9  F (36.6  C) Temp src: Oral BP: 132/61 Pulse: 82   Resp: 16 SpO2: 95 % O2 Device: None (Room air)    Weight: 195 lbs 15.82 oz    GENERAL:  Comfortable.  PSYCH: pleasant, oriented, No acute distress.  HEART:  RRR.  LUNGS:  Normal Respiratory effort.   EXTREMITIES:  L foot wrapped in bandage, no significant drainage  NEUROLOGIC:  grossly intact    Medical Decision Making       55 MINUTES SPENT BY ME on the date of service doing chart review, history, exam, documentation & further activities per the " note.      Data     I have personally reviewed the following data over the past 24 hrs:    9.5  \   12.2 (L)   / 230     135 101 12.3 /  363 (H)   4.1 25 1.12 \       Imaging results reviewed over the past 24 hrs:   No results found for this or any previous visit (from the past 24 hours).

## 2025-03-28 NOTE — PROGRESS NOTES
[place] [facility] PROGRESS NOTE:      ASSESSMENT:  Status post Left hallux excisional debridement 3/27/25    type 2 diabetes, peripheral neuropathy, CAD with stents x 4, hypertension, hyperlipidemia     MEDICAL DECISION MAKING:   Significant diabetic left foot infection in the setting of type 2 diabetes, peripheral neuropathy, and hallux limitus.  Systemically stable  CAM boot left foot for ambulation with walker assist.     Continue empiric antibiotic coverage  Deep tissue culture surveillance, aerobic step group C/G, aerobic cultures pending.   Monitor the left great toe to see how soft tissues demarcate  New bandage change with removal of iodoform packing followed by new packing placed, iodine, gauze, cling, ace.   Continue elevation of left leg while in bed.     He reports longer-term issues with his toe involving callusing.  I explained how the hallux limitus, peripheral neuropathy and normal gait cycle contribute to the callusing and likely underlying ulceration.    I explained that if the toe survives, he will still have challenges with it.  He will be at risk for future ulceration and infection.  Currently there is risk of losing the toe.    There might be future elective surgery options to help offload this part of the toe.    If after excisional debridement and washout the toe worsens or does not improve, amputation is reasonable.    I anticipate 2 to 3 days of IV antibiotics and monitoring after surgery.  Empiric antibiotic coverage until deep tissue culture results available.    INR 1.28  WBC trending down from 15-10.7  Hemoglobin A1c 8.4%    Lorene Flores DPM   [facility] Department of Podiatry/Foot & Ankle Surgery        PATIENT HISTORY:  Kaden Cain is a 75 year old male with a past medical history significant for type 2 diabetes, peripheral neuropathy, CAD with stents x 4, hypertension, hyperlipidemia seen at bedside status post right hallux ulceration with cellulitis  "excisional debridement 3/27/25. He has no pain to his foot.  No nausea, vomiting, fever, chills.     MEDICATIONS:  Reviewed in Epic. Current IV Zosyn and vancomycin.     ALLERGIES:  No Known Allergies     EXAM:  Vitals: /61 (BP Location: Right arm, Patient Position: Semi-Casey's, Cuff Size: Adult Regular)   Pulse 82   Temp 97.9  F (36.6  C) (Oral)   Resp 16   Ht 1.854 m (6' 1\")   Wt 88.9 kg (195 lb 15.8 oz)   SpO2 95%   BMI 25.86 kg/m    BMI= Body mass index is 25.86 kg/m .    Derm: erythema/edema improving, localized right hallux. Ulceration medial right hallux IPJ 2cm in diameter probes to muscle/tendon, granular base, serosanguinous drainage, no purulence, no mal-odor, positive tracking plantar and dorsal.      Vasc:      Pedal pulses are palpable for the dorsalis pedis posterior tibial artery, bilateral foot.  Capillary fill time </= 3 seconds  Pedal skin appears well-perfused  Neuro:      Light touch sensation grossly diminished to all sensory nerve distributions, bilateral foot.  No apparent spastic contractures or other deformity secondary to neurologic compromise.  MSK:      Notable for hallux limitus, left.  Bilateral lower extremity muscle strength presents is normal.  No gross deformities  Adequate ankle and subtalar joint range of motion  Calf:    Neg for redness, swelling or tenderness    Imaging:   EXAM: MR FOOT LEFT W/O CONTRAST  LOCATION: [facility]  DATE: 3/26/2025     INDICATION: Left foot infection, evaluate for abscess or osteomyelitis.  COMPARISON: 3/26/2025 radiographs.  TECHNIQUE: Unenhanced.     FINDINGS:      JOINTS AND BONES:   -No fracture or osteomyelitis. Advanced degenerative changes at the first MTP joint with mild hallux valgus. Mild degenerative changes at the IP joint of the great toe. Mild degenerative changes at the third and fifth TMT joints. No joint effusion.   Normal tibial and fibular sesamoids.     TENDONS:   -No tendon tear, " tendinopathy, or tenosynovitis.      LIGAMENTS:   -Lisfranc ligament: Intact. No subluxation.     MUSCLES AND SOFT TISSUES:   -Marked global atrophy of the intrinsic musculature of the foot. Small soft tissue ulcer along the plantar/medial aspect of the great toe just distal to the IP joint. There is a moderate-sized, confluent fluid collection along the medial aspect of the   proximal phalanx of the great toe worrisome for an abscess as seen series 8001 image 18 and series 07171 image 16. There is moderate adjacent poorly defined cellulitis in the great toe as well as throughout the dorsum of the foot.                                                                      IMPRESSION:  1.  No definite evidence of osteomyelitis.     2.  Soft tissue ulcer along the plantar/medial aspect of the great toe just distal to the IP joint with a possible abscess along the medial margin of the proximal phalanx.     3.  Advanced degenerative changes at the first MTP joint.     4.  Marked global atrophy of the intrinsic musculature of the foot.    EXAM: XR FOOT LEFT G/E 3 VIEWS  LOCATION: [facility]  DATE: 3/26/2025     INDICATION: Foot infection.  COMPARISON: None.                                                                      IMPRESSION: Soft tissue irregularity and likely ulceration/wound plantar medial great toe at the level of the distal phalangeal base near the IP joint. Severe great toe soft tissue swelling. No convincing radiographic evidence for acute osteomyelitis of   the great toe. Left forefoot MRI could further assess.     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 No acute displaced left foot fracture or dislocation. Degenerative changes scattered in the left midfoot.    LABS:   Lab Results   Component Value Date    A1C 8.4 03/26/2025    A1C 9.0 05/24/2024       Lab Results   Component Value Date    INR 1.28 03/27/2025       Lab Results    Component Value Date    WBC 10.7 03/27/2025     Lab Results   Component Value Date    HGB 11.8 03/27/2025     Lab Results   Component Value Date     03/27/2025       All cultures:  Recent Labs   Lab 03/27/25  1221 03/26/25  1508   CULTURE Culture in progress  1+ Streptococcus dysgalactiae (Group C/G Streptococcus)*  See corresponding culture for results No growth after 1 day  No growth after 1 day

## 2025-03-28 NOTE — PLAN OF CARE
"Temp: 98  F (36.7  C) Temp src: Oral BP: 136/68 Pulse: 79   Resp: 18 SpO2: 95 % O2 Device: None (Room air)       A&Ox4. Up SBA with walker. Denies pain, BLE numbness, pcds applied- later refused. LLE elevated on wedge. Dressing change by podiatry today 3/28. Ambulated in hallway- tolerated well.  Plan- blood cultures pending, PT okayed to discharge home, podiatry following, zosyn every 6 hours, cultures from left great toe + for streptococcus, wear post op shoe when oob, heel weightbearing.     Problem: Adult Inpatient Plan of Care  Goal: Plan of Care Review  Description: The Plan of Care Review/Shift note should be completed every shift.  The Outcome Evaluation is a brief statement about your assessment that the patient is improving, declining, or no change.  This information will be displayed automatically on your shiftnote.  Outcome: Progressing  Goal: Patient-Specific Goal (Individualized)  Description: You can add care plan individualizations to a care plan. Examples of Individualization might be:  \"Parent requests to be called daily at 9am for status\", \"I have a hard time hearing out of my right ear\", or \"Do not touch me to wake me up as it startlesme\".  Outcome: Progressing  Goal: Absence of Hospital-Acquired Illness or Injury  Outcome: Progressing  Intervention: Identify and Manage Fall Risk  Recent Flowsheet Documentation  Taken 3/28/2025 1617 by Mariajose Jauregui RN  Safety Promotion/Fall Prevention:   safety round/check completed   nonskid shoes/slippers when out of bed  Intervention: Prevent Skin Injury  Recent Flowsheet Documentation  Taken 3/28/2025 1617 by Mariajose Jauregui RN  Body Position: position changed independently  Intervention: Prevent and Manage VTE (Venous Thromboembolism) Risk  Recent Flowsheet Documentation  Taken 3/28/2025 1617 by Mariajose Jauregui RN  VTE Prevention/Management: SCDs on (sequential compression devices)  Goal: Optimal Comfort and Wellbeing  Outcome: Progressing  Goal: Readiness for " Transition of Care  Outcome: Progressing     Problem: Delirium  Goal: Optimal Coping  Outcome: Progressing  Goal: Improved Behavioral Control  Outcome: Progressing  Goal: Improved Attention and Thought Clarity  Outcome: Progressing  Goal: Improved Sleep  Outcome: Progressing     Problem: Infection  Goal: Absence of Infection Signs and Symptoms  Outcome: Progressing     Problem: Comorbidity Management  Goal: Blood Glucose Levels Within Targeted Range  Outcome: Progressing  Intervention: Monitor and Manage Glycemia  Recent Flowsheet Documentation  Taken 3/28/2025 1617 by Mariajose Jauregui RN  Medication Review/Management: medications reviewed  Goal: Blood Pressure in Desired Range  Outcome: Progressing  Intervention: Maintain Blood Pressure Management  Recent Flowsheet Documentation  Taken 3/28/2025 1617 by Mariajose Jauregui RN  Medication Review/Management: medications reviewed

## 2025-03-28 NOTE — PROGRESS NOTES
"   03/28/25 1001   Appointment Info   Signing Clinician's Name / Credentials (PT) Lalita Cortes, PT   Rehab Comments (PT) Heel WB L foot with short Cam boot   Living Environment   People in Home spouse   Current Living Arrangements house   Home Accessibility stairs to enter home;stairs within home   Number of Stairs, Main Entrance 1  (platform step)   Stair Railings, Main Entrance none   Number of Stairs, Within Home, Primary seven   Stair Railings, Within Home, Primary railing on right side (ascending)   Transportation Anticipated family or friend will provide   Living Environment Comments split level home   Self-Care   Usual Activity Tolerance good   Current Activity Tolerance moderate   Equipment Currently Used at Home none   Fall history within last six months no   Activity/Exercise/Self-Care Comment normally independent with mobility and cares   General Information   Onset of Illness/Injury or Date of Surgery 03/26/25   Referring Physician Lara Robertson, CEASAR   Patient/Family Therapy Goals Statement (PT) return home with assist of family   Pertinent History of Current Problem (include personal factors and/or comorbidities that impact the POC) per chart: \"Kaden Cain is a pleasant 75 year-old male with a past medical history significant for type 2 diabetes, peripheral neuropathy, CAD with stents x 4, hypertension, hyperlipidemia admitted for a diabetic left foot infection largely involving the great toe.  Clinical exam suggests a significant infection, yet x-ray and MRI do not support underlying osteomyelitis\";  seen POD #1 s/pleft foot excisional irrigation and debridement, Left - Toe   Existing Precautions/Restrictions fall;weight bearing   Weight-Bearing Status - LLE other (see comments)  (heel WB)   General Observations patient in bed; L LE elevated; daughter and spouse present   Cognition   Cognitive Status Comments appears forgetful at times needing safety reminder cues; pleasant and cooperative "   Pain Assessment   Patient Currently in Pain No   Integumentary/Edema   Integumentary/Edema Comments L great toe I & D; foot with dressing on it; see nursing notes for further information   Posture    Posture Comments forward flexed at head and trunk   Range of Motion (ROM)   ROM Comment WFL   Strength (Manual Muscle Testing)   Strength Comments mild functional weakness noted with mobility   Bed Mobility   Comment, (Bed Mobility) independent for supine<>sit   Transfers   Comment, (Transfers) sit>stand with min/CGA; mild unsteadiness with some posterior LOB   Gait/Stairs (Locomotion)   Comment, (Gait/Stairs) min/CGA with walker and short cam boot on L; heel WB   Balance   Balance Comments impaired; no falls reported at baseline but has neuropathy in LE's; mild unsteadiness with mobility noted; no gross LOB   Sensory Examination   Sensory Perception Comments neuropathy in LE's; not feeling pain from surgery on L great toe   Clinical Impression   Criteria for Skilled Therapeutic Intervention Yes, treatment indicated   PT Diagnosis (PT) impaired functional mobility   Influenced by the following impairments heel WB on L foot; impaired balance; forgetful; mild functional weakness   Functional limitations due to impairments impaired independence with mobility and cares secondary to above deficits   Clinical Presentation (PT Evaluation Complexity) evolving   Clinical Presentation Rationale clinical judgement; level of assist   Clinical Decision Making (Complexity) low complexity   Planned Therapy Interventions (PT) balance training;gait training;stair training;transfer training;progressive activity/exercise   Risk & Benefits of therapy have been explained evaluation/treatment results reviewed;risks/benefits reviewed;care plan/treatment goals reviewed;current/potential barriers reviewed;participants voiced agreement with care plan;patient;spouse/significant other;daughter   Clinical Impression Comments patient below  baseline level of function   PT Total Evaluation Time   PT Eval, Low Complexity Minutes (19044) 10   Physical Therapy Goals   PT Frequency Daily   PT Predicted Duration/Target Date for Goal Attainment 03/30/25   PT Goals Transfers;Gait;Stairs   PT: Transfers Supervision/stand-by assist;Sit to/from stand;Bed to/from chair;Assistive device;Within precautions   PT: Gait Supervision/stand-by assist;Rolling walker;Within precautions  (75 feet; heel WB)   PT: Stairs Assistive device;Rail on right  (CGA)   PT Discharge Planning   PT Discharge Recommendation (DC Rec) home with assist   PT Rationale for DC Rec Anticipate patient will be safe to discharge to home with assist from family and use of a walker/Cam boot to maintain heel WB on L foot; recommend use of crutch and rail for stairs; family able to assist as needed; requesting a walker be issued for discharge; has crutch he can borrow for stairs   PT Brief overview of current status A x 1 with FWW; reminder cues for heel WB on L   PT Total Distance Amb During Session (feet) 80   PT Equipment Needed at Discharge walker, rolling;crutches, axillary

## 2025-03-28 NOTE — PLAN OF CARE
"Goal Outcome Evaluation:      Plan of Care Reviewed With: patient  Care from [id number]    Inpatient Progress Note:  For complete assessment see flow sheet documentation.    /70 (BP Location: Right arm)   Pulse 80   Temp 98.9  F (37.2  C) (Oral)   Resp 18   Ht 1.854 m (6' 1\")   Wt 88.9 kg (195 lb 15.8 oz)   SpO2 95%   BMI 25.86 kg/m       Alert & oriented x4. Denies pain. VSS. Capno on but on RA. BG's 277: 192. Tolerating Abx Zosyn. Left leg elevated on blue wedge. Dressing to foot intact.PT consult before use of boot per ortho. Plan of care ongoing.    Overall Patient Progress: improvingOverall Patient Progress: improving    Problem: Adult Inpatient Plan of Care  Goal: Plan of Care Review  Description: The Plan of Care Review/Shift note should be completed every shift.  The Outcome Evaluation is a brief statement about your assessment that the patient is improving, declining, or no change.  This information will be displayed automatically on your shiftnote.  Outcome: Progressing  Flowsheets (Taken 3/28/2025 0705)  Plan of Care Reviewed With: patient  Overall Patient Progress: improving  Goal: Patient-Specific Goal (Individualized)  Description: You can add care plan individualizations to a care plan. Examples of Individualization might be:  \"Parent requests to be called daily at 9am for status\", \"I have a hard time hearing out of my right ear\", or \"Do not touch me to wake me up as it startlesme\".  Outcome: Progressing  Goal: Absence of Hospital-Acquired Illness or Injury  Outcome: Progressing  Intervention: Identify and Manage Fall Risk  Recent Flowsheet Documentation  Taken 3/27/2025 2009 by Kimberley Tuttle, RN  Safety Promotion/Fall Prevention: safety round/check completed  Intervention: Prevent Skin Injury  Recent Flowsheet Documentation  Taken 3/27/2025 2009 by Kimberley Tuttle, RN  Body Position: position changed independently  Goal: Optimal Comfort and Wellbeing  Outcome: Progressing  Goal: " Readiness for Transition of Care  Outcome: Progressing  Flowsheets (Taken 3/28/2025 0705)  Anticipated Changes Related to Illness: none  Transportation Anticipated: family or friend will provide  Concerns to be Addressed: discharge planning  Intervention: Mutually Develop Transition Plan  Recent Flowsheet Documentation  Taken 3/28/2025 0705 by Kimberley Tuttle RN  Anticipated Changes Related to Illness: none  Transportation Anticipated: family or friend will provide  Concerns to be Addressed: discharge planning     Problem: Delirium  Goal: Optimal Coping  Outcome: Progressing  Goal: Improved Behavioral Control  Outcome: Progressing  Intervention: Minimize Safety Risk  Recent Flowsheet Documentation  Taken 3/27/2025 2009 by Kimberley Tuttle RN  Enhanced Safety Measures: review medications for side effects with activity  Goal: Improved Attention and Thought Clarity  Outcome: Progressing  Goal: Improved Sleep  Outcome: Progressing     Problem: Infection  Goal: Absence of Infection Signs and Symptoms  Outcome: Progressing     Problem: Comorbidity Management  Goal: Blood Glucose Levels Within Targeted Range  Outcome: Progressing  Intervention: Monitor and Manage Glycemia  Recent Flowsheet Documentation  Taken 3/27/2025 2009 by Kimberley Tuttle RN  Medication Review/Management: medications reviewed  Goal: Blood Pressure in Desired Range  Outcome: Progressing  Intervention: Maintain Blood Pressure Management  Recent Flowsheet Documentation  Taken 3/27/2025 2009 by Kimberley Tuttle RN  Medication Review/Management: medications reviewed

## 2025-03-29 ENCOUNTER — APPOINTMENT (OUTPATIENT)
Dept: PHYSICAL THERAPY | Facility: CLINIC | Age: 75
DRG: 623 | End: 2025-03-29
Payer: MEDICARE

## 2025-03-29 VITALS
TEMPERATURE: 98.3 F | DIASTOLIC BLOOD PRESSURE: 68 MMHG | HEART RATE: 67 BPM | BODY MASS INDEX: 25.98 KG/M2 | OXYGEN SATURATION: 93 % | SYSTOLIC BLOOD PRESSURE: 134 MMHG | WEIGHT: 195.99 LBS | RESPIRATION RATE: 16 BRPM | HEIGHT: 73 IN

## 2025-03-29 LAB
ANION GAP SERPL CALCULATED.3IONS-SCNC: 11 MMOL/L (ref 7–15)
BACTERIA TISS BX CULT: ABNORMAL
BUN SERPL-MCNC: 13.1 MG/DL (ref 8–23)
CALCIUM SERPL-MCNC: 8.3 MG/DL (ref 8.8–10.4)
CHLORIDE SERPL-SCNC: 104 MMOL/L (ref 98–107)
CREAT SERPL-MCNC: 1.21 MG/DL (ref 0.67–1.17)
EGFRCR SERPLBLD CKD-EPI 2021: 63 ML/MIN/1.73M2
ERYTHROCYTE [DISTWIDTH] IN BLOOD BY AUTOMATED COUNT: 11.7 % (ref 10–15)
GLUCOSE BLDC GLUCOMTR-MCNC: 110 MG/DL (ref 70–99)
GLUCOSE BLDC GLUCOMTR-MCNC: 189 MG/DL (ref 70–99)
GLUCOSE SERPL-MCNC: 101 MG/DL (ref 70–99)
HCO3 SERPL-SCNC: 24 MMOL/L (ref 22–29)
HCT VFR BLD AUTO: 32.3 % (ref 40–53)
HGB BLD-MCNC: 11.4 G/DL (ref 13.3–17.7)
MCH RBC QN AUTO: 31.1 PG (ref 26.5–33)
MCHC RBC AUTO-ENTMCNC: 35.3 G/DL (ref 31.5–36.5)
MCV RBC AUTO: 88 FL (ref 78–100)
PLATELET # BLD AUTO: 241 10E3/UL (ref 150–450)
POTASSIUM SERPL-SCNC: 4 MMOL/L (ref 3.4–5.3)
RBC # BLD AUTO: 3.67 10E6/UL (ref 4.4–5.9)
SODIUM SERPL-SCNC: 139 MMOL/L (ref 135–145)
WBC # BLD AUTO: 8.4 10E3/UL (ref 4–11)

## 2025-03-29 PROCEDURE — 36415 COLL VENOUS BLD VENIPUNCTURE: CPT | Performed by: PHYSICIAN ASSISTANT

## 2025-03-29 PROCEDURE — 250N000011 HC RX IP 250 OP 636: Performed by: PODIATRIST

## 2025-03-29 PROCEDURE — 97530 THERAPEUTIC ACTIVITIES: CPT | Mod: GP

## 2025-03-29 PROCEDURE — 250N000013 HC RX MED GY IP 250 OP 250 PS 637: Performed by: PODIATRIST

## 2025-03-29 PROCEDURE — 250N000013 HC RX MED GY IP 250 OP 250 PS 637: Performed by: PHYSICIAN ASSISTANT

## 2025-03-29 PROCEDURE — 97116 GAIT TRAINING THERAPY: CPT | Mod: GP

## 2025-03-29 PROCEDURE — 80048 BASIC METABOLIC PNL TOTAL CA: CPT | Performed by: PHYSICIAN ASSISTANT

## 2025-03-29 PROCEDURE — 82310 ASSAY OF CALCIUM: CPT | Performed by: PHYSICIAN ASSISTANT

## 2025-03-29 PROCEDURE — 99239 HOSP IP/OBS DSCHRG MGMT >30: CPT | Performed by: PHYSICIAN ASSISTANT

## 2025-03-29 PROCEDURE — 99232 SBSQ HOSP IP/OBS MODERATE 35: CPT | Performed by: PODIATRIST

## 2025-03-29 PROCEDURE — 85014 HEMATOCRIT: CPT | Performed by: PHYSICIAN ASSISTANT

## 2025-03-29 RX ORDER — GABAPENTIN 100 MG/1
100 CAPSULE ORAL 3 TIMES DAILY
Qty: 90 CAPSULE | Refills: 0 | Status: SHIPPED | OUTPATIENT
Start: 2025-03-29 | End: 2025-04-28

## 2025-03-29 RX ADMIN — PIPERACILLIN AND TAZOBACTAM 4.5 G: 4; .5 INJECTION, POWDER, FOR SOLUTION INTRAVENOUS at 11:02

## 2025-03-29 RX ADMIN — ASPIRIN 81 MG: 81 TABLET, CHEWABLE ORAL at 08:27

## 2025-03-29 RX ADMIN — INSULIN ASPART 1 UNITS: 100 INJECTION, SOLUTION INTRAVENOUS; SUBCUTANEOUS at 08:32

## 2025-03-29 RX ADMIN — METFORMIN HYDROCHLORIDE 1000 MG: 500 TABLET, EXTENDED RELEASE ORAL at 08:27

## 2025-03-29 RX ADMIN — PIPERACILLIN AND TAZOBACTAM 4.5 G: 4; .5 INJECTION, POWDER, FOR SOLUTION INTRAVENOUS at 04:07

## 2025-03-29 RX ADMIN — METOPROLOL TARTRATE 25 MG: 25 TABLET, FILM COATED ORAL at 08:27

## 2025-03-29 RX ADMIN — GLIPIZIDE 10 MG: 2.5 TABLET, EXTENDED RELEASE ORAL at 08:26

## 2025-03-29 RX ADMIN — CLOPIDOGREL BISULFATE 75 MG: 75 TABLET ORAL at 08:27

## 2025-03-29 RX ADMIN — LISINOPRIL 40 MG: 40 TABLET ORAL at 08:27

## 2025-03-29 RX ADMIN — GABAPENTIN 100 MG: 100 CAPSULE ORAL at 08:27

## 2025-03-29 ASSESSMENT — ACTIVITIES OF DAILY LIVING (ADL)
ADLS_ACUITY_SCORE: 37
ADLS_ACUITY_SCORE: 35
ADLS_ACUITY_SCORE: 37
ADLS_ACUITY_SCORE: 36
ADLS_ACUITY_SCORE: 36
ADLS_ACUITY_SCORE: 37
ADLS_ACUITY_SCORE: 36

## 2025-03-29 NOTE — PLAN OF CARE
"Care from [id number]      Inpatient Progress Note:  For complete assessment see flow sheet documentation.       /69 (BP Location: Right arm)   Pulse 67   Temp 98  F (36.7  C) (Oral)   Resp 18   Ht 1.854 m (6' 1\")   Wt 88.9 kg (195 lb 15.8 oz)   SpO2 93%   BMI 25.86 kg/m         Neuro: A&O x4  Vital Signs: VSS  Pain/Comfort: Denies pain.   Diet/po intake: Diabetic diet.  Output: Urinal or up to bathroom for use.   Activity/Ambulation: SBA, boot to be worn when ambulating.   Pertinent assessments: Change toe dressing as needed.   Infusions: Q6 Zosyn  Major Shift Events: N/A  Plan (Upcoming Events): N/A  Discharge/Transfer Needs: Will discharge home when medically ready.    Will continue with POC.          Will continue to monitor and provide cares.    Goal Outcome Evaluation:      Plan of Care Reviewed With: patient    Overall Patient Progress: improvingOverall Patient Progress: improving    Outcome Evaluation: VSS. Denies pain or discomfort. A&O x4. Zosyn given.      Problem: Adult Inpatient Plan of Care  Goal: Plan of Care Review  Description: The Plan of Care Review/Shift note should be completed every shift.  The Outcome Evaluation is a brief statement about your assessment that the patient is improving, declining, or no change.  This information will be displayed automatically on your shiftnote.  Outcome: Progressing  Flowsheets (Taken 3/29/2025 6041)  Outcome Evaluation: VSS. Denies pain or discomfort. A&O x4. Zosyn given.  Plan of Care Reviewed With: patient  Overall Patient Progress: improving  Goal: Patient-Specific Goal (Individualized)  Description: You can add care plan individualizations to a care plan. Examples of Individualization might be:  \"Parent requests to be called daily at 9am for status\", \"I have a hard time hearing out of my right ear\", or \"Do not touch me to wake me up as it startlesme\".  Outcome: Progressing  Goal: Absence of Hospital-Acquired Illness or Injury  Outcome: " Progressing  Intervention: Identify and Manage Fall Risk  Recent Flowsheet Documentation  Taken 3/29/2025 0300 by Daphne Ambrosio RN  Safety Promotion/Fall Prevention:   safety round/check completed   nonskid shoes/slippers when out of bed  Intervention: Prevent Skin Injury  Recent Flowsheet Documentation  Taken 3/29/2025 0300 by Daphne Ambrosio RN  Body Position: position changed independently  Intervention: Prevent and Manage VTE (Venous Thromboembolism) Risk  Recent Flowsheet Documentation  Taken 3/29/2025 0300 by Daphne Ambrosio RN  VTE Prevention/Management: SCDs on (sequential compression devices)  Goal: Optimal Comfort and Wellbeing  Outcome: Progressing  Goal: Readiness for Transition of Care  Outcome: Progressing     Problem: Delirium  Goal: Optimal Coping  Outcome: Progressing  Goal: Improved Behavioral Control  Outcome: Progressing  Intervention: Minimize Safety Risk  Recent Flowsheet Documentation  Taken 3/29/2025 0300 by Daphne Ambrosio RN  Enhanced Safety Measures: review medications for side effects with activity  Goal: Improved Attention and Thought Clarity  Outcome: Progressing  Goal: Improved Sleep  Outcome: Progressing     Problem: Infection  Goal: Absence of Infection Signs and Symptoms  Outcome: Progressing     Problem: Comorbidity Management  Goal: Blood Glucose Levels Within Targeted Range  Outcome: Progressing  Intervention: Monitor and Manage Glycemia  Recent Flowsheet Documentation  Taken 3/29/2025 0300 by Daphne Ambrosio RN  Medication Review/Management: medications reviewed  Goal: Blood Pressure in Desired Range  Outcome: Progressing  Intervention: Maintain Blood Pressure Management  Recent Flowsheet Documentation  Taken 3/29/2025 0300 by Daphne Ambrosio RN  Medication Review/Management: medications reviewed

## 2025-03-29 NOTE — DISCHARGE INSTRUCTIONS
Follow-up with wound care either 3/31/25 or 4/1/25 for bandage change and wound check. Patient call to make an appointment.   Thank you for choosing [facility] Podiatry / Foot & Ankle Surgery!    TRIAGE LINE: [phone]  APPOINTMENTS: [phone]

## 2025-03-29 NOTE — PLAN OF CARE
"Vitals are Temp: 98.3  F (36.8  C) Temp src: Oral BP: 134/68 Pulse: 67   Resp: 16 SpO2: 93 %.  Patient is Alert and Oriented x4. They are 1 person Assist with Gait Belt and Walker.  Pt is on a Regular diet.  They are denying pain.  Patient is Saline locked. Plan to discharge back home today and follow outpatient with WOC and podiatry for dressing changes.             Problem: Adult Inpatient Plan of Care  Goal: Plan of Care Review  Description: The Plan of Care Review/Shift note should be completed every shift.  The Outcome Evaluation is a brief statement about your assessment that the patient is improving, declining, or no change.  This information will be displayed automatically on your shiftnote.  Outcome: Progressing  Goal: Patient-Specific Goal (Individualized)  Description: You can add care plan individualizations to a care plan. Examples of Individualization might be:  \"Parent requests to be called daily at 9am for status\", \"I have a hard time hearing out of my right ear\", or \"Do not touch me to wake me up as it startlesme\".  Outcome: Progressing  Goal: Absence of Hospital-Acquired Illness or Injury  Outcome: Progressing  Intervention: Identify and Manage Fall Risk  Recent Flowsheet Documentation  Taken 3/29/2025 0933 by Margaret Simmons RN  Safety Promotion/Fall Prevention: activity supervised  Intervention: Prevent Skin Injury  Recent Flowsheet Documentation  Taken 3/29/2025 0821 by Margaret Simmons RN  Body Position: position changed independently  Intervention: Prevent and Manage VTE (Venous Thromboembolism) Risk  Recent Flowsheet Documentation  Taken 3/29/2025 0933 by Margaret Simmons RN  VTE Prevention/Management: SCDs off (sequential compression devices)  Intervention: Prevent Infection  Recent Flowsheet Documentation  Taken 3/29/2025 0933 by Margaret Simmons RN  Infection Prevention: rest/sleep promoted  Goal: Optimal Comfort and Wellbeing  Outcome: Progressing  Goal: Readiness for Transition " of Care  Outcome: Progressing     Problem: Infection  Goal: Absence of Infection Signs and Symptoms  Outcome: Progressing

## 2025-03-29 NOTE — PLAN OF CARE
"Physical Therapy Discharge Summary    Reason for therapy discharge:    Discharged to home.    Progress towards therapy goal(s). See goals on Care Plan in [facility] electronic health record for goal details.  Goals partially met.  Barriers to achieving goals:   discharge from facility.    Therapy recommendation(s):    Continued therapy is recommended.  Rationale/Recommendations:  per most recent PT note: \"Anticipate patient will be safe to discharge to home with assist from family and use of a walker/Cam boot to maintain heel WB on L foot; recommending doing stairs with Ax1, B hands on R railing going up at diagonal for improved B feet placement (difficulty fitting L CAM boot on stair unless at angle). Family able to assist as needed; requesting a walker be issued for discharge.\".       "

## 2025-03-29 NOTE — PLAN OF CARE
Patient's After Visit Summary was reviewed with patient.   Patient verbalized understanding of After Visit Summary, recommended follow up and was given an opportunity to ask questions.   Discharge medications sent home with patient/family: YES,   Discharged with spouse and daughter

## 2025-03-29 NOTE — PROGRESS NOTES
[facility] PROGRESS NOTE:      ASSESSMENT:  Status post Left hallux excisional debridement 3/27/25    type 2 diabetes, peripheral neuropathy, CAD with stents x 4, hypertension, hyperlipidemia     MEDICAL DECISION MAKING:   Significant diabetic left foot infection in the setting of type 2 diabetes, peripheral neuropathy, and hallux limitus.  Systemically stable  CAM boot left foot for ambulation with walker assist.     Continue empiric antibiotic coverage while in the hospital. His anaerobic cultures are finalized with aerobic step group C/G, aerobic cultures preliminary no growth. Discharge home on Augmentin 875/125 mg oral twice a day x 14 days  Monitor the left great toe to see how soft tissues demarcate, cellulitis appears to be resolving, tracking plantar has improved.   New bandage change with removal of iodoform packing followed by new packing placed, iodine, gauze, cling, ace. He will maintain his bandage upon discharge and follow-up in the wound clinic next week Monday 3/31/25 or Tuesday 4/1/25 for bandage change and wound care.   Continue elevation of left leg while in bed.     He reports longer-term issues with his toe involving callusing.  I explained how the hallux limitus, peripheral neuropathy and normal gait cycle contribute to the callusing and likely underlying ulceration. We will monitor as an outpatient for wound healing and need for further corrective surgery of his left hallux/1st metatarsal phalangeal joint    INR 1.28  WBC trending down from 15-10.7  Hemoglobin A1c 8.4%    Lorene Flores DPM   [facility] Department of Podiatry/Foot & Ankle Surgery        PATIENT HISTORY:  Kaden Cain is a 75 year old male with a past medical history significant for type 2 diabetes, peripheral neuropathy, CAD with stents x 4, hypertension, hyperlipidemia seen at bedside status post right hallux ulceration with cellulitis excisional debridement 3/27/25. He has no pain to his  "foot.  No nausea, vomiting, fever, chills.     MEDICATIONS:  Reviewed in Epic. Current IV Zosyn and vancomycin.     ALLERGIES:  No Known Allergies     EXAM:  Vitals: /69 (BP Location: Right arm)   Pulse 67   Temp 98  F (36.7  C) (Oral)   Resp 18   Ht 1.854 m (6' 1\")   Wt 88.9 kg (195 lb 15.8 oz)   SpO2 93%   BMI 25.86 kg/m    BMI= Body mass index is 25.86 kg/m .               Derm: erythema/edema improving, localized right hallux. Ulceration medial right hallux IPJ 2cm in diameter probes to muscle/tendon, granular base, serosanguinous drainage, no purulence, no mal-odor, positive tracking plantar and dorsal.      Vasc:      Pedal pulses are palpable for the dorsalis pedis posterior tibial artery, bilateral foot.  Capillary fill time </= 3 seconds  Pedal skin appears well-perfused  Neuro:      Light touch sensation grossly diminished to all sensory nerve distributions, bilateral foot.  No apparent spastic contractures or other deformity secondary to neurologic compromise.  MSK:      Notable for hallux limitus, left.  Bilateral lower extremity muscle strength presents is normal.  No gross deformities  Adequate ankle and subtalar joint range of motion  Calf:    Neg for redness, swelling or tenderness    Imaging:   EXAM: MR FOOT LEFT W/O CONTRAST  LOCATION: [facility]  DATE: 3/26/2025     INDICATION: Left foot infection, evaluate for abscess or osteomyelitis.  COMPARISON: 3/26/2025 radiographs.  TECHNIQUE: Unenhanced.     FINDINGS:      JOINTS AND BONES:   -No fracture or osteomyelitis. Advanced degenerative changes at the first MTP joint with mild hallux valgus. Mild degenerative changes at the IP joint of the great toe. Mild degenerative changes at the third and fifth TMT joints. No joint effusion.   Normal tibial and fibular sesamoids.     TENDONS:   -No tendon tear, tendinopathy, or tenosynovitis.      LIGAMENTS:   -Lisfranc ligament: Intact. No subluxation.     MUSCLES AND SOFT " TISSUES:   -Marked global atrophy of the intrinsic musculature of the foot. Small soft tissue ulcer along the plantar/medial aspect of the great toe just distal to the IP joint. There is a moderate-sized, confluent fluid collection along the medial aspect of the   proximal phalanx of the great toe worrisome for an abscess as seen series 8001 image 18 and series 27073 image 16. There is moderate adjacent poorly defined cellulitis in the great toe as well as throughout the dorsum of the foot.                                                                      IMPRESSION:  1.  No definite evidence of osteomyelitis.     2.  Soft tissue ulcer along the plantar/medial aspect of the great toe just distal to the IP joint with a possible abscess along the medial margin of the proximal phalanx.     3.  Advanced degenerative changes at the first MTP joint.     4.  Marked global atrophy of the intrinsic musculature of the foot.    EXAM: XR FOOT LEFT G/E 3 VIEWS  LOCATION: [facility]  DATE: 3/26/2025     INDICATION: Foot infection.  COMPARISON: None.                                                                      IMPRESSION: Soft tissue irregularity and likely ulceration/wound plantar medial great toe at the level of the distal phalangeal base near the IP joint. Severe great toe soft tissue swelling. No convincing radiographic evidence for acute osteomyelitis of   the great toe. Left forefoot MRI could further assess.     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 No acute displaced left foot fracture or dislocation. Degenerative changes scattered in the left midfoot.    LABS:   Lab Results   Component Value Date    A1C 8.4 03/26/2025    A1C 9.0 05/24/2024       Lab Results   Component Value Date    INR 1.28 03/27/2025       Lab Results   Component Value Date    WBC 10.7 03/27/2025     Lab Results   Component Value Date    HGB 11.8 03/27/2025     Lab  Results   Component Value Date     03/27/2025       All cultures:  Recent Labs   Lab 03/27/25  1221 03/26/25  1508   CULTURE No anaerobic organisms isolated after 1 day  Culture in progress  1+ Streptococcus dysgalactiae (Group C/G Streptococcus)*  See corresponding culture for results No growth after 2 days  No growth after 2 days

## 2025-03-29 NOTE — DISCHARGE SUMMARY
"[facility]  Hospitalist Discharge Summary      Date of Admission:  3/26/2025  Date of Discharge:  3/29/2025 12:39 PM  Discharging Provider: Lara Robertson PA-C  Discharge Service: Hospitalist Service    Discharge Diagnoses   L great toe cellulitis and abscess  S/p I&D L great toe 3/27  DM type II with peripheral neuropathy    Clinically Significant Risk Factors     # DMII: A1C = 8.4 % (Ref range: <5.7 %) within past 6 months  # Overweight: Estimated body mass index is 25.86 kg/m  as calculated from the following:    Height as of this encounter: 1.854 m (6' 1\").    Weight as of this encounter: 88.9 kg (195 lb 15.8 oz).       Follow-ups Needed After Discharge   Follow-up Appointments       Follow Up      Follow up with Endocrinology on 4/1/2025 as previously scheduled to discuss further DM management. Long acting insulin was effective here but unsure if there is an oral option that is affordable and preferred by Endocrinology.    Follow up in wound clinic on Monday or Tuesday.    Has Podiatry follow up scheduled 4/3 with Dr. Prajapati        [facility] Follow-up with Existing Primary Care Provider (PCP)      Please see details below         Schedule Primary Care visit within: 30 Days               Unresulted Labs Ordered in the Past 30 Days of this Admission       Date and Time Order Name Status Description    3/27/2025 12:33 PM Anaerobic Bacterial Culture Routine Preliminary     3/26/2025  2:41 PM Blood Culture Arm, Left Preliminary     3/26/2025  2:41 PM Blood Culture Arm, Left Preliminary         These results will be followed up by PCP    Discharge Disposition   Discharged to home  Condition at discharge: Stable    Hospital Course   Kaden Cain is a 75 year old male who who has a PMHx significant for poorly controlled non-insulin dependent type 2 diabetes, coronary disease with stents x 4 5/2024, hypertension, hyperlipidemia, and peripheral neuropathy, who was admitted 3/26/2025 with a left " great toe infection.      He is also describes some orthopneic type symptoms that is been going on for some time as he is on a beta-blocker as well.  He denies any problems with bleeding or shortness of breath.    ED work up significant for hyponatremia of 130, glucose 354, normal lactic acid and WBC of 15.0.   X-ray and MRI of the left foot are negative for osteomyelitis but concerning for possible abscess.    He was placed on Zosyn and vancomycin empirically.    Podiatry consulted in ED, s/p I&D 3/27.    Left great toe infection  Abscess of L great toe  S/p I&D L great toe 3/27  Patient is presenting with a significant infection of left great toe with erythema going up his foot. Chronic issues with callus formation, developed swelling 3/25 and ulceration 3/26. Notes pain up his calf as well.  Pt denies fever but had temp of 101.2 overnight.   XR and MRI of the left foot negative for osteomyelitis.  - Podiatry consulted. Recommended I&D, well tolerated. Dressing changes per Podiatry, packing place  - operative cultures growing streptococcus dysgalactiae, sensitive to ampicillin, penicillin, vanco and cephalosporins.   - treated with IV Zosyn and Vanco, IV vancomycin discontinued after culture data available  - discharge on 14 days of PO Augmentin   - PRN Tylenol for pain at home.    - leukocytosis resolved; 15-->10.7  - blood cultures negative   - Orthotics consulted for post op boot placement  - PT consulted, home with Home Care RN/PT/HHA  - needs improved glycemic control  - continue ASA and Plavix  - gabapentin started on admission for neuropathy, continue on discharge. Titrate with PCP  - follow up in Wound Care Clinic on Monday or Tuesday  - follow up with Podiatry as directed     Type 2 diabetes with peripheral neuropathy  HgbA1c 8.4 on admission. Managed with glipizide and metformin. Recently seen by endocrinology, added Farxiga but not covered by insurance and not affordable.   - continue PO meds   -  started 10 units Lantus 3/27, increase to 15 units 3/29.   - Pharmacy liaison consulted to assess for alternatives to Farxiga that are covered by insurance, see note.  - pt has an appt with endocrinology on 4/1. Given appt is within a few days of discharge, will hold off on prescribing Lantus on discharge and defer further management to his endocrinologist.    - pt encouraged to cut out simple sugars from diet and limit carbohydrates until able to follow up with endocrinology and start a new medication  - gabapentin started on admission, 100 mg TID. Titrate as an outpatient pending response   - Diabetes Nurse Educator referral placed after discharge. Highly encourage pt to follow up with this appt.     Coronary artery disease  S/p PCI with JOSE x4 5/2024  Presented with MI in 5/2024 and had an angiogram,  PCI with JOSE x4 to the prox-mid LAD, distal LAD, proximal circumflex and mid-circumflex. There is residual disease in the rPDA.    Pt reports sensation of total body weakness at times while standing. Sounds like this started prior to toe infection  - Continue on his home Plavix and beta blocker.    - resumed ASA after surgery  - orthostatics negative  - recommend slow transitions from seated to standing and standing to walking  - PT consult, Home Care services ordered on discharge  - pt did not complain of this after surgery. Follow up with PCP     Hyponatremia  Sodium is 130.  Was given IV fluids in the emergency room.    - Na 134-->135    Essential hypertension   - Continue home lisinopril and metoprolol     Hyperlipidemia   - Continue home crestor    Consultations This Hospital Stay   PHARMACY TO DOSE VANCO  PHARMACY TO DOSE VANCO  PODIATRY IP CONSULT  PHARMACY LIAISON FOR MEDICATION COVERAGE CONSULT  PHYSICAL THERAPY ADULT IP CONSULT    Code Status   No CPR- Pre-arrest intubation OK    Time Spent on this Encounter   I, Lara Robertson PA-C, personally saw the patient today and spent greater than 30 minutes  discharging this patient.       Lara Robertson PA-C  [facility] OBSERVATION DEPT  [address]  [facility] [address]  Phone: [phone]  ______________________________________________________________________    Physical Exam   Vital Signs: Temp: 98.3  F (36.8  C) Temp src: Oral BP: 134/68 Pulse: 67   Resp: 16 SpO2: 93 % O2 Device: None (Room air)    Weight: 195 lbs 15.82 oz    GENERAL:  Comfortable.  PSYCH: pleasant, oriented, No acute distress.  HEART:  RRR  LUNGS:  Normal Respiratory effort.  EXTREMITIES:  able to ambulate. Left foot is dressed and wrapped  NEUROLOGIC:  grossly intact       Primary Care Physician   [facility]    Discharge Orders      Wound Care Referral      Adult Diabetes Education [facility] Referral      Reason for your hospital stay    L great toe cellulitis and abscess. Podiatry consulted and took you to the OR for I&D of the toe. You tolerated the surgery well. You will need improved glucose control to help with healing. You were given insulin here which was effective. You have an appt with your endocrinologist on Tuesday, will defer starting insulin until you follow up with endo. They previously recommended Farxiga but this was not affordable.     Activity    Your activity upon discharge: activity as tolerated. Ambulate with short boot     Follow Up    Follow up with Endocrinology on 4/1/2025 as previously scheduled to discuss further DM management. Long acting insulin was effective here but unsure if there is an oral option that is affordable and preferred by Endocrinology.    Follow up in wound clinic on Monday or Tuesday.    Has Podiatry follow up scheduled 4/3 with Dr. Prajapati     When to contact your care team    Call your primary doctor if you have any of the following: temperature greater than 101, increased drainage, increased swelling, or increased pain.     [facility] Order for DME - ONLY FOR DME     Diet    Follow this diet upon discharge:  Regular diet. Cut out sugary foods, avoid high carbohydrates until seen by endo and improve glycemic control     Hospital Follow-up with Existing Primary Care Provider (PCP)    Please see details below            Significant Results and Procedures   Most Recent 3 CBC's:  Recent Labs   Lab Test 03/29/25  0528 03/28/25  0643 03/27/25  0553   WBC 8.4 9.5 10.7   HGB 11.4* 12.2* 11.8*   MCV 88 89 88    230 202     Most Recent 3 BMP's:  Recent Labs   Lab Test 03/29/25  0828 03/29/25  0528 03/29/25  0230 03/28/25  1045 03/28/25  0643 03/27/25  0913 03/27/25  0553   NA  --  139  --   --  135  --  134*   POTASSIUM  --  4.0  --   --  4.1  --  4.2   CHLORIDE  --  104  --   --  101  --  101   CO2  --  24  --   --  25  --  23   BUN  --  13.1  --   --  12.3  --  12.4   CR  --  1.21*  --   --  1.12  --  1.11   ANIONGAP  --  11  --   --  9  --  10   BIRGIT  --  8.3*  --   --  8.4*  --  8.5*   * 101* 110*   < > 184*   < > 277*    < > = values in this interval not displayed.     Most Recent 2 LFT's:  Recent Labs   Lab Test 03/26/25  1508 09/09/24  0733   AST 31  --    ALT 31 20   ALKPHOS 110  --    BILITOTAL 1.0  --      7-Day Micro Results       Collected Updated Procedure Result Status      03/27/2025 1221 03/29/2025 1334 Anaerobic Bacterial Culture Routine [[id number]]   Tissue from Toe, Left    Preliminary result Component Value   Culture Culture in progress  [P]                03/27/2025 1221 03/27/2025 1713 Gram Stain [[id number]]   (Abnormal)   Tissue from Toe, Left    Final result Component Value   GS Culture See corresponding culture for results   Gram Stain Result 1+ Gram positive cocci   Gram Stain Result 1+ WBC seen            03/27/2025 1221 03/29/2025 0806 Tissue Aerobic Bacterial Culture Routine [[id number]]   (Abnormal)   Tissue from Toe, Left    Final result Component Value   Culture 1+ Streptococcus dysgalactiae (Group C/G Streptococcus)    This organism is susceptible to ampicillin, penicillin,  vancomycin and the cephalosporins. If treatment is required and your patient is allergic to penicillin, contact the microbiology lab within 5 days to request susceptibility testing.               03/26/2025 1508 03/28/2025 1816 Blood Culture Arm, Left [[id number]]   Blood from Arm, Left    Preliminary result Component Value   Culture No growth after 2 days  [P]                03/26/2025 1508 03/28/2025 1816 Blood Culture Arm, Left [[id number]]   Blood from Arm, Left    Preliminary result Component Value   Culture No growth after 2 days  [P]                      Most Recent Hemoglobin A1c:  Recent Labs   Lab Test 03/26/25  1508   A1C 8.4*     Most Recent 6 glucoses:  Recent Labs   Lab Test 03/29/25  0828 03/29/25  0528 03/29/25  0230 03/28/25  2100 03/28/25  1634 03/28/25  1245   * 101* 110* 166* 292* 363*   ,   Results for orders placed or performed during the hospital encounter of 03/26/25   XR Foot Left G/E 3 Views    Narrative    EXAM: XR FOOT LEFT G/E 3 VIEWS  LOCATION: [facility]  DATE: 3/26/2025    INDICATION: Foot infection.  COMPARISON: None.      Impression    IMPRESSION: Soft tissue irregularity and likely ulceration/wound plantar medial great toe at the level of the distal phalangeal base near the IP joint. Severe great toe soft tissue swelling. No convincing radiographic evidence for acute osteomyelitis of   the great toe. Left forefoot MRI could further assess.    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 No acute displaced left foot fracture or dislocation. Degenerative changes scattered in the left midfoot.   MR Foot Left w/o Contrast    Narrative    EXAM: MR FOOT LEFT W/O CONTRAST  LOCATION: [facility]  DATE: 3/26/2025    INDICATION: Left foot infection, evaluate for abscess or osteomyelitis.  COMPARISON: 3/26/2025 radiographs.  TECHNIQUE: Unenhanced.    FINDINGS:     JOINTS AND BONES:   -No  fracture or osteomyelitis. Advanced degenerative changes at the first MTP joint with mild hallux valgus. Mild degenerative changes at the IP joint of the great toe. Mild degenerative changes at the third and fifth TMT joints. No joint effusion.   Normal tibial and fibular sesamoids.    TENDONS:   -No tendon tear, tendinopathy, or tenosynovitis.     LIGAMENTS:   -Lisfranc ligament: Intact. No subluxation.    MUSCLES AND SOFT TISSUES:   -Marked global atrophy of the intrinsic musculature of the foot. Small soft tissue ulcer along the plantar/medial aspect of the great toe just distal to the IP joint. There is a moderate-sized, confluent fluid collection along the medial aspect of the   proximal phalanx of the great toe worrisome for an abscess as seen series 8001 image 18 and series 64045 image 16. There is moderate adjacent poorly defined cellulitis in the great toe as well as throughout the dorsum of the foot.      Impression    IMPRESSION:  1.  No definite evidence of osteomyelitis.    2.  Soft tissue ulcer along the plantar/medial aspect of the great toe just distal to the IP joint with a possible abscess along the medial margin of the proximal phalanx.    3.  Advanced degenerative changes at the first MTP joint.    4.  Marked global atrophy of the intrinsic musculature of the foot.       Discharge Medications   Current Discharge Medication List        START taking these medications    Details   amoxicillin-clavulanate (AUGMENTIN) 875-125 MG tablet Take 1 tablet by mouth 2 times daily for 14 days.  Qty: 28 tablet, Refills: 0    Associated Diagnoses: Diabetic foot infection (H)      gabapentin (NEURONTIN) 100 MG capsule Take 1 capsule (100 mg) by mouth 3 times daily.  Qty: 90 capsule, Refills: 0    Associated Diagnoses: Type 2 diabetes mellitus with diabetic polyneuropathy, without long-term current use of insulin (H)           CONTINUE these medications which have NOT CHANGED    Details   aspirin 81 MG EC tablet  Take 81 mg by mouth daily.      clopidogrel (PLAVIX) 75 MG tablet Take 75 mg by mouth daily.      glipiZIDE (GLUCOTROL XL) 10 MG 24 hr tablet Take 10 mg by mouth daily (with breakfast).      lisinopril (ZESTRIL) 40 MG tablet Take 1 tablet (40 mg) by mouth daily.  Qty: 90 tablet, Refills: 3    Associated Diagnoses: NSTEMI (non-ST elevated myocardial infarction) (H)      metFORMIN (GLUCOPHAGE XR) 500 MG 24 hr tablet Take 1,000 mg by mouth 2 times daily (with meals)      metoprolol tartrate (LOPRESSOR) 25 MG tablet Take 1 tablet (25 mg) by mouth 2 times daily  Qty: 60 tablet, Refills: 0    Comments: Future refills by PCP [facility] with phone number [phone].  Associated Diagnoses: NSTEMI (non-ST elevated myocardial infarction) (H)      nitroGLYcerin (NITROSTAT) 0.4 MG sublingual tablet For chest pain place 1 tablet under the tongue every 5 minutes for 3 doses. If symptoms persist 5 minutes after 1st dose call 911.  Qty: 15 tablet, Refills: 0    Associated Diagnoses: NSTEMI (non-ST elevated myocardial infarction) (H)      rosuvastatin (CRESTOR) 20 MG tablet Take 1 tablet (20 mg) by mouth at bedtime  Qty: 30 tablet, Refills: 0    Comments: Future refills by PCP [facility] with phone number [phone].  Associated Diagnoses: NSTEMI (non-ST elevated myocardial infarction) (H)           STOP taking these medications       aspirin (ASA) 81 MG chewable tablet Comments:   Reason for Stopping:             Allergies   No Known Allergies

## 2025-03-31 ENCOUNTER — HOSPITAL ENCOUNTER (OUTPATIENT)
Dept: WOUND CARE | Facility: CLINIC | Age: 75
Discharge: HOME | End: 2025-03-31
Attending: PODIATRIST | Admitting: PODIATRIST
Payer: MEDICARE

## 2025-03-31 ENCOUNTER — TELEPHONE (OUTPATIENT)
Dept: WOUND CARE | Facility: CLINIC | Age: 75
End: 2025-03-31
Payer: MEDICARE

## 2025-03-31 VITALS
SYSTOLIC BLOOD PRESSURE: 127 MMHG | HEART RATE: 78 BPM | BODY MASS INDEX: 26.48 KG/M2 | TEMPERATURE: 97.1 F | HEIGHT: 73 IN | WEIGHT: 199.8 LBS | DIASTOLIC BLOOD PRESSURE: 71 MMHG

## 2025-03-31 DIAGNOSIS — Z98.890 POST-OPERATIVE STATE: ICD-10-CM

## 2025-03-31 DIAGNOSIS — L97.522 SKIN ULCER OF LEFT GREAT TOE WITH FAT LAYER EXPOSED (H): Primary | ICD-10-CM

## 2025-03-31 DIAGNOSIS — E11.628 DIABETIC FOOT INFECTION (H): ICD-10-CM

## 2025-03-31 DIAGNOSIS — L08.9 DIABETIC FOOT INFECTION (H): ICD-10-CM

## 2025-03-31 LAB
BACTERIA BLD CULT: NO GROWTH
BACTERIA BLD CULT: NO GROWTH
BACTERIA TISS BX CULT: NORMAL

## 2025-03-31 PROCEDURE — 11042 DBRDMT SUBQ TIS 1ST 20SQCM/<: CPT | Performed by: PODIATRIST

## 2025-03-31 PROCEDURE — 99203 OFFICE O/P NEW LOW 30 MIN: CPT | Mod: 25 | Performed by: PODIATRIST

## 2025-03-31 PROCEDURE — 11042 DBRDMT SUBQ TIS 1ST 20SQCM/<: CPT | Mod: LT | Performed by: PODIATRIST

## 2025-03-31 NOTE — TELEPHONE ENCOUNTER
Patient called the clinic to request scheduling after he was recently discharged from [facility] and told to contact the I for a rewrap of his left toe/foot.

## 2025-03-31 NOTE — DISCHARGE INSTRUCTIONS
"03/31/2025   Kaden Cain   1950    A DME order for supplies has been placed to [employer]. If there are any issues with your order including not receiving the order please call our clinic at [phone]. Do not call [facility]. We are better able to help you. We can contact the [employer] rep to better assist than if you call the general [facility] number. We can provide a tracking number also if needed.     [facility] is now sending an ancillary kit free of charge. This kit includes gauze, gloves, and saline. You will receive 1 kit per 15 days of the supply order. We typically order 30 days of supplies so you will receive 2 kits. Please let us know if you would not like to receive this kit and we can communicate this to [facility].    Dressing changes outside of clinic are being performed by Spouse    Plan 03/31/2025   - you can purchase vashe online on [employer] or get downstairs in [address]  -Follow with Dr. Prajapati to re-evaluate and make a new plan  -Continue to take your antibiotics  -  If you are experiencing:    Fevers > 100.8 degrees Farenheit    Chills     Nausea/vomiting    New or worsening pain in or near wounds (Sudden increase in Pain)    Increased redness (fire engine red)    Increased swelling    Changes in drainage (pus)    or other symptoms of concern you should go to the Emergency Department and be evaluated as these symptoms could indicate serious infection.    Wound Dressing Change: Left medial toe 1   - Wash your hands with soap and water before you begin your dressing change and prepare a clean surface for dressings.  - Cleanse with mild unscented soap and water (such as Cetaphil, Cerave or Dove)   - Primary dressing: Apply a Vashe moistened 2x2 gauze pad into the wound bed and fluff on top   - Secondary dressing: cover with 3 4x4 guaze pads   - Secure with 1 4\" conforming roll gauze and medipore tape   -secure with ACE wrap  -wear offloading boot  Change dressing Daily and as needed for " "soilage/leakage    Wound Dressing Change: Left plantar toe 1   - Wash your hands with soap and water before you begin your dressing change and prepare a clean surface for dressings.  -Cleanse with mild unscented soap and water (such as Cetaphil, Cerave or Dove)   -Primary dressing: Paint area with betadine and let dry  -Secondary dressing:cover with 3 4x4 gauze pads  - Secure with 1 4\" conforming roll gauze and medipore tape   Change dressing Daily and as needed for soilage/leakage  -secure with ACE wrap  You do not need to change the dressing on the days you are coming to the Wound Clinic      A diet high in protein is important for wound healing, we recommend getting 90 grams of protein per day. Taking protein shakes or bars are a good way to get extra protein in your diet.   Good sources of protein:  Pork 26g per 3 oz  Whey protein powder - 24g per scoop (on average)  Greek yogurt - 23g per 8oz   Chicken or Turkey - 23g per 3oz  Fish - 20-25g per 3oz  Beef - 18-23g per 3oz  Tofu - 10g per 1/2 cup  Navy beans - 20g per cup  Cottage cheese - 14g per 1/2 cup   Lentils - 13g per 1/4 cup  Beef jerky 13g per 1oz  2% milk - 8g per cup  Peanut butter - 8g per 2 tablespoons  Eggs - 6g per egg  Mixed nuts - 6g per 2oz         Main Provider: Letitia Medel D.P.M. March 31, 2025    Call us at [phone] if you have any questions about your wounds, if you have redness or swelling around your wound, have a fever of 101 degrees Fahrenheit or greater or if you have any other problems or concerns. We answer the phone Monday through Friday 8 am to 4 pm, please leave a message as we check the voicemail frequently throughout the day. If you have a concern over the weekend, please leave a message and we will return your call Monday. If the need is urgent, go to the ER or urgent care.    If you had a positive experience please indicate that on your patient satisfaction survey form that [facility] will be sending you.    It " was a pleasure meeting with you today.  Thank you for allowing me and my team the privilege of caring for you today.  YOU are the reason we are here, and I truly hope we provided you with the excellent service you deserve.  Please let us know if there is anything else we can do for you so that we can be sure you are leaving completely satisfied with your care experience.      If you have any billing related questions please call the [facility] Business office at [phone]. The clinic staff does not handle billing related matters.    If you are scheduled to have a follow up appointment, you will receive a reminder call the day before your visit. On the appointment day please arrive 15 minutes prior to your appointment time. If you are unable to keep that appointment, please call the clinic to cancel or reschedule. If you are more than 10 minutes late or greater for your scheduled appointment time, the clinic policy is that you may be asked to reschedule.

## 2025-03-31 NOTE — TELEPHONE ENCOUNTER
The patient has an appointment with Dr. Prajapati 4/3/25 but at discharge the patient was directed to contact [facility] to schedule a dressing change today or tomorrow. Patient called and scheduled at [facility].

## 2025-03-31 NOTE — ADDENDUM NOTE
Encounter addended by: Sammie Delgado RN on: 3/31/2025 3:43 PM   Actions taken: Order list changed, Diagnosis association updated

## 2025-03-31 NOTE — PROGRESS NOTES
[facility] Progress Note    Subject: Patient was seen at wound center for his left foot. He was admitted for a severe left foot infection and underwent a debridement by Dr. Prajapati on 3/27. He had an MRI done which didn't show overt osteomyelitis, so an amputation wasn't done, but was discussed as possibly being needed at a later date. He was directed to follow up at wound care for a dressing change and then has follow up with Dr. Prajapati later in the week. Denies pain to the site. Denies N/F/V/C/D. Is taking antibiotics. Presents today with his wife.     PMH: Diabetes Mellitus, Coronary Artery Disease, HTN    Social Hx:   Social History     Socioeconomic History    Marital status:      Spouse name: Not on file    Number of children: Not on file    Years of education: Not on file    Highest education level: Not on file   Occupational History    Not on file   Tobacco Use    Smoking status: Never    Smokeless tobacco: Never   Substance and Sexual Activity    Alcohol use: Not Currently     Comment: not since 1976    Drug use: Not on file    Sexual activity: Not on file   Other Topics Concern    Not on file   Social History Narrative    Not on file     Social Drivers of Health     Financial Resource Strain: Low Risk  (3/26/2025)    Financial Resource Strain     Within the past 12 months, have you or your family members you live with been unable to get utilities (heat, electricity) when it was really needed?: No   Food Insecurity: Low Risk  (3/26/2025)    Food Insecurity     Within the past 12 months, did you worry that your food would run out before you got money to buy more?: No     Within the past 12 months, did the food you bought just not last and you didn t have money to get more?: No   Transportation Needs: Low Risk  (3/26/2025)    Transportation Needs     Within the past 12 months, has lack of transportation kept you from medical appointments, getting your medicines, non-medical meetings  or appointments, work, or from getting things that you need?: No   Physical Activity: Not on file   Stress: Not on file   Social Connections: Unknown (12/30/2021)    Received from [facility] [employer] [facility]    Social Connections     Frequency of Communication with Friends and Family: Not on file   Interpersonal Safety: Low Risk  (3/31/2025)    Interpersonal Safety     Do you feel physically and emotionally safe where you currently live?: Yes     Within the past 12 months, have you been hit, slapped, kicked or otherwise physically hurt by someone?: No     Within the past 12 months, have you been humiliated or emotionally abused in other ways by your partner or ex-partner?: No   Housing Stability: Low Risk  (3/26/2025)    Housing Stability     Do you have housing? : Yes     Are you worried about losing your housing?: No       Surgical Hx:   Past Surgical History:   Procedure Laterality Date    CV CORONARY ANGIOGRAM N/A 5/25/2024    Procedure: Coronary Angiogram;  Surgeon: Salomon Carrillo MD;  Location: [facility] CARDIAC CATH LAB    CV INTRAVASULAR ULTRASOUND N/A 5/25/2024    Procedure: Intravascular Ultrasound;  Surgeon: Salomon Carrillo MD;  Location: [facility] CARDIAC CATH LAB    CV PCI N/A 5/25/2024    Procedure: Percutaneous Coronary Intervention;  Surgeon: Salomon Carrillo MD;  Location: [facility] CARDIAC CATH LAB    IRRIGATION AND DEBRIDEMENT TOE, COMBINED Left 3/27/2025    Procedure: excisional debridement, left great toe, involving area greater than 20 cm .   Excisional debridement was carried down to, including, the deep fascia.;  Surgeon: Wilmer Prajapati DPM;  Location: [facility] OR       Allergies:  No Known Allergies    Medications:   Current Outpatient Medications   Medication Sig Dispense Refill    amoxicillin-clavulanate (AUGMENTIN) 875-125 MG tablet Take 1 tablet by mouth 2 times daily for 14 days. 28 tablet 0    aspirin 81 MG EC tablet Take 81 mg by mouth daily.      clopidogrel  "(PLAVIX) 75 MG tablet Take 75 mg by mouth daily.      gabapentin (NEURONTIN) 100 MG capsule Take 1 capsule (100 mg) by mouth 3 times daily. 90 capsule 0    glipiZIDE (GLUCOTROL XL) 10 MG 24 hr tablet Take 10 mg by mouth daily (with breakfast).      lisinopril (ZESTRIL) 40 MG tablet Take 1 tablet (40 mg) by mouth daily. 90 tablet 3    metFORMIN (GLUCOPHAGE XR) 500 MG 24 hr tablet Take 1,000 mg by mouth 2 times daily (with meals)      metoprolol tartrate (LOPRESSOR) 25 MG tablet Take 1 tablet (25 mg) by mouth 2 times daily 60 tablet 0    nitroGLYcerin (NITROSTAT) 0.4 MG sublingual tablet For chest pain place 1 tablet under the tongue every 5 minutes for 3 doses. If symptoms persist 5 minutes after 1st dose call 911. 15 tablet 0    rosuvastatin (CRESTOR) 20 MG tablet Take 1 tablet (20 mg) by mouth at bedtime 30 tablet 0     No current facility-administered medications for this encounter.         Objective:  Vitals:  Ht 1.854 m (6' 1\")   Wt 90.6 kg (199 lb 12.8 oz)   BMI 26.36 kg/m      A1C: 8.4     General:  Patient is alert and orientated.  NAD     Dermatologic: Left hallux: dorsal medial ulcer close to level of bone. Necrotic tissue present with some purulence. Plantar hallux ulcer without significant depth or necrosis. Site still erythematous, although improved from pictures on admission.     .   Wound (used by OP [facility] only) 03/31/25 1205 1 toe left;medial (Active)   Thickness/Stage full thickness 03/31/25 1200   Base slough;pink;red;necrotic 03/31/25 1200   Periwound swelling;redness 03/31/25 1200   Periwound Temperature warm 03/31/25 1200   Periwound Skin Turgor soft 03/31/25 1200   Edges open 03/31/25 1200   Length (cm) 1.1 03/31/25 1200   Width (cm) 0.8 03/31/25 1200   Depth (cm) 0.8 03/31/25 1200   Wound (cm^2) 0.88 cm^2 03/31/25 1200   Wound Volume (cm^3) 0.7 cm^3 03/31/25 1200   Undermining [Depth (cm)/Location] 11-1 o'clock / 0.4cm 03/31/25 1200   Drainage Characteristics/Odor serosanguineous;purulent " 03/31/25 1200   Drainage Amount moderate 03/31/25 1200   Care, Wound debrided 03/31/25 1200       Wound (used by OP [facility] only) 03/31/25 1217 1 toe left;plantar (Active)   Thickness/Stage full thickness 03/31/25 1200   Base red;pink;slough 03/31/25 1200   Periwound redness;swelling 03/31/25 1200   Periwound Temperature warm 03/31/25 1200   Periwound Skin Turgor soft 03/31/25 1200   Edges open 03/31/25 1200   Length (cm) 2.7 03/31/25 1200   Width (cm) 0.4 03/31/25 1200   Depth (cm) 0.3 03/31/25 1200   Wound (cm^2) 1.08 cm^2 03/31/25 1200   Wound Volume (cm^3) 0.32 cm^3 03/31/25 1200   Drainage Characteristics/Odor serosanguineous 03/31/25 1200   Drainage Amount moderate 03/31/25 1200   Care, Wound debrided 03/31/25 1200          Vascular: DP & PT pulses are intact & regular bilaterally.  No significant edema or varicosities noted.  CFT and skin temperature is normal to both lower extremities.     Neurologic: Lower extremity sensation is intact to light touch.  No evidence of weakness or contracture in the lower extremities.  No evidence of neuropathy.     Musculoskeletal: Patient is ambulatory without assistive device or brace.  No gross ankle deformity noted.  No foot or ankle joint effusion is noted.    Imaging:      IMPRESSION: Soft tissue irregularity and likely ulceration/wound plantar medial great toe at the level of the distal phalangeal base near the IP joint. Severe great toe soft tissue swelling. No convincing radiographic evidence for acute osteomyelitis of   the great toe. Left forefoot MRI could further assess.     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 No acute displaced left foot fracture or dislocation. Degenerative changes scattered in the left midfoot.    IMPRESSION:  1.  No definite evidence of osteomyelitis.     2.  Soft tissue ulcer along the plantar/medial aspect of the great toe just distal to the IP joint with a possible abscess along the  medial margin of the proximal phalanx.     3.  Advanced degenerative changes at the first MTP joint.     4.  Marked global atrophy of the intrinsic musculature of the foot.    I personally reviewed the images and agree with the reports as stated.  No overt osteomyelitis to hallux     Cultures:    Toe, Left; Tissue   0 Result Notes  Culture 1+ Streptococcus dysgalactiae (Group C/G Streptococcus) Abnormal    This organism is susceptible to ampicillin, penicillin, vancomycin and the cephalosporins. If treatment is required and your patient is allergic to penicillin, contact the microbiology lab within 5 days to request susceptibility testing.        Resulting Agency: [facility]       Assessment:   Left hallux ulcers, close to level of bone  Diabetes Mellitus --> hba1c: 8.4    Plan:    -Discussed all findings with patient. Chart and imaging reviewed.     -Patient seen by myself today for the first time. Discussed with him that hallux has ulcers that are quite large and close to level of bone. Despite MRI not showing osteomyelitis, he may do better with an amputation for a more predictable healing pattern. It may also only be a matter of time before he does develop osteomyelitis, given the extent of soft tissue infection. Patient understands and agrees.     -Plans to follow up with Dr. Prajapati this week.     -Discussed dressing change plan. Plan to do daily to insure no new/recurrent cellulitis. In that case, may need to be directed to the ER. Currently on antibiotics. Discussed other reasons to present to the ER: fatigue, fevers, nausea, etc.     -Excisional debridement done as below.         Procedure:  After verbal consent, per protocol lidocaine was applied to the wound. Excisional debridement was performed on ulcer.   #15 blade was used to debride ulcer down to and including subcutaneous tissue, on the left foot. Bleeding controlled with light pressure.  No drainage noted.  Debrided site measures 2 cm x 1.5 cm x .5 cm < 20sq  cm debrided.  Dry dressing applied to foot.  Patient tolerated procedure well.    Letitia Medel DPM                  Further instructions from your care team         03/31/2025   Kaden Cain   1950    A DME order for supplies has been placed to [employer]. If there are any issues with your order including not receiving the order please call our clinic at [phone]. Do not call [email or web address]. We are better able to help you. We can contact the [employer] rep to better assist than if you call the general [employer] number. We can provide a tracking number also if needed.     [employer] is now sending an ancillary kit free of charge. This kit includes gauze, gloves, and saline. You will receive 1 kit per 15 days of the supply order. We typically order 30 days of supplies so you will receive 2 kits. Please let us know if you would not like to receive this kit and we can communicate this to [email or web address].    Dressing changes outside of clinic are being performed by Spouse    Plan 03/31/2025   - you can purchase vashe online on [employer] or get downstairs in [address]  -Follow with Dr. Prajapati to re-evaluate and make a new plan  -Continue to take your antibiotics  -  If you are experiencing:    Fevers > 100.8 degrees Farenheit    Chills     Nausea/vomiting    New or worsening pain in or near wounds (Sudden increase in Pain)    Increased redness (fire engine red)    Increased swelling    Changes in drainage (pus)    or other symptoms of concern you should go to the Emergency Department and be evaluated as these symptoms could indicate serious infection.    Wound Dressing Change: Left medial toe 1   - Wash your hands with soap and water before you begin your dressing change and prepare a clean surface for dressings.  - Cleanse with mild unscented soap and water (such as Cetaphil, Cerave or Dove)   - Primary dressing: Apply a Vashe moistened 2x2 gauze pad into the wound bed and fluff on top   - Secondary dressing: cover with 3 4x4 guaze pads  "  - Secure with 1 4\" conforming roll gauze and medipore tape   -secure with ACE wrap  -wear offloading boot  Change dressing Daily and as needed for soilage/leakage    Wound Dressing Change: Left plantar toe 1   - Wash your hands with soap and water before you begin your dressing change and prepare a clean surface for dressings.  -Cleanse with mild unscented soap and water (such as Cetaphil, Cerave or Dove)   -Primary dressing: Paint area with betadine and let dry  -Secondary dressing:cover with 3 4x4 gauze pads  - Secure with 1 4\" conforming roll gauze and medipore tape   Change dressing Daily and as needed for soilage/leakage  -secure with ACE wrap  You do not need to change the dressing on the days you are coming to the Wound Clinic      A diet high in protein is important for wound healing, we recommend getting 90 grams of protein per day. Taking protein shakes or bars are a good way to get extra protein in your diet.   Good sources of protein:  Pork 26g per 3 oz  Whey protein powder - 24g per scoop (on average)  Greek yogurt - 23g per 8oz   Chicken or Turkey - 23g per 3oz  Fish - 20-25g per 3oz  Beef - 18-23g per 3oz  Tofu - 10g per 1/2 cup  Navy beans - 20g per cup  Cottage cheese - 14g per 1/2 cup   Lentils - 13g per 1/4 cup  Beef jerky 13g per 1oz  2% milk - 8g per cup  Peanut butter - 8g per 2 tablespoons  Eggs - 6g per egg  Mixed nuts - 6g per 2oz       Main Provider: COMFORT AtkinsonPCONSTANTINO March 31, 2025    Call us at [phone] if you have any questions about your wounds, if you have redness or swelling around your wound, have a fever of 101 degrees Fahrenheit or greater or if you have any other problems or concerns. We answer the phone Monday through Friday 8 am to 4 pm, please leave a message as we check the voicemail frequently throughout the day. If you have a concern over the weekend, please leave a message and we will return your call Monday. If the need is urgent, go to the ER or urgent " care.    If you had a positive experience please indicate that on your patient satisfaction survey form that [facility] will be sending you.    It was a pleasure meeting with you today.  Thank you for allowing me and my team the privilege of caring for you today.  YOU are the reason we are here, and I truly hope we provided you with the excellent service you deserve.  Please let us know if there is anything else we can do for you so that we can be sure you are leaving completely satisfied with your care experience.      If you have any billing related questions please call the [facility] Business office at [phone]. The clinic staff does not handle billing related matters.    If you are scheduled to have a follow up appointment, you will receive a reminder call the day before your visit. On the appointment day please arrive 15 minutes prior to your appointment time. If you are unable to keep that appointment, please call the clinic to cancel or reschedule. If you are more than 10 minutes late or greater for your scheduled appointment time, the clinic policy is that you may be asked to reschedule.

## 2025-04-01 NOTE — ADDENDUM NOTE
Encounter addended by: Letitia Medel DPM on: 4/1/2025 10:05 AM   Actions taken: Clinical Note Signed

## 2025-04-01 NOTE — ADDENDUM NOTE
Encounter addended by: Yadira Vegas RN on: 4/1/2025 9:27 AM   Actions taken: LDA properties accepted

## 2025-04-02 ENCOUNTER — ALLIED HEALTH/NURSE VISIT (OUTPATIENT)
Dept: EDUCATION SERVICES | Facility: CLINIC | Age: 75
End: 2025-04-02
Attending: PHYSICIAN ASSISTANT
Payer: MEDICARE

## 2025-04-02 DIAGNOSIS — E11.42 TYPE 2 DIABETES MELLITUS WITH DIABETIC POLYNEUROPATHY, WITHOUT LONG-TERM CURRENT USE OF INSULIN (H): ICD-10-CM

## 2025-04-02 PROCEDURE — G0108 DIAB MANAGE TRN  PER INDIV: HCPCS | Performed by: DIETITIAN, REGISTERED

## 2025-04-02 PROCEDURE — 99207 PR NO CHARGE NURSE ONLY: CPT | Performed by: DIETITIAN, REGISTERED

## 2025-04-02 NOTE — PROGRESS NOTES
Diabetes Self-Management Education & Support    Presents for: Individual review    Type of Service: In Person Visit      Assessment  Patient presents for diabetes education following hospitalization for non-healing wound.  Works with PCP/Endocrinologist through [facility].  Unclear if he has seen a diabetes educator.  Patient states Feroz ordered insulin yesterday.  He does not know type, dose, etc.  Was looking for instruction today, but unable to provide education without more details.  Patient has not been testing glucose due to confusion with how to use BG meter.  Provided instruction today.  Result = 115.  Diet recall reveals 2 meals daily, low protein intake.    Patient's most recent   Lab Results   Component Value Date    A1C 8.4 03/26/2025     is not meeting goal of <7.0    Diabetes knowledge and skills assessment:   Patient is knowledgeable in diabetes management concepts related to: none    Based on learning assessment above, most appropriate setting for further diabetes education would be: Individual setting.    Care Plan and Education Provided:  Healthy Eating: Balanced meals, Consistency in amount and timing of carbohydrate intake, and protein , Monitoring: Frequency of monitoring, Individual glucose targets, and Log and interpret results, and Taking Medication: Action of prescribed medication(s), Administering and storing injectable diabetes medications, Proper site selection and rotation for injections, Side effects of prescribed medication(s), and When to take medication(s)    Patient verbalized understanding of diabetes self-management education concepts discussed, opportunities for ongoing education and support, and recommendations provided today.    Plan    Test blood sugar 3x/day for the next 3-5 days.  Report #s to Endo before starting insulin.  Ask for diabetes education referral through [facility].  Include more protein foods/drinks - provided list.    Topics to cover at upcoming visits: Healthy  "Eating, Monitoring, Taking Medication, and Problem Solving    Follow-up:  Upcoming Diabetes Ed Appointments     Visit Type Date Time Department    DIABETES ED 4/2/2025 11:00 AM [facility]        See Care Plan for co-developed, patient-state behavior change goals.    Education Materials Provided:  - Blood Glucose Log Sheet   High Protein Nutrition Therapy      Subjective/Objective  Kaden is an 75 year old year old, presenting for the following diabetes education related to: Presents for: Individual review  Accompanied by: Self  Diabetes education in the past 24mo: Yes  Focus of Visit: Healthy Eating, Taking Medication  Diabetes type: Type 2  How confident are you filling out medical forms by yourself:: Not Assessed  Transportation concerns: No  Other concerns:: None  Cultural Influences/Ethnic Background:  Not  or       Diabetes Symptoms & Complications:  Diabetes Related Symptoms: Slow healing wounds  Weight trend: Stable  Complications assessed today?: No    Patient Problem List and Family Medical History reviewed for relevant medical history, current medical status, and diabetes risk factors.    Vitals:  There were no vitals taken for this visit.  Estimated body mass index is 26.36 kg/m  as calculated from the following:    Height as of 3/31/25: 1.854 m (6' 1\").    Weight as of 3/31/25: 90.6 kg (199 lb 12.8 oz).   Last 3 BP:   BP Readings from Last 3 Encounters:   03/31/25 127/71   03/29/25 134/68   12/09/24 (!) 148/64       History   Smoking Status    Never   Smokeless Tobacco    Never       Labs:  Lab Results   Component Value Date    A1C 8.4 03/26/2025     Lab Results   Component Value Date     03/29/2025     03/29/2025     Lab Results   Component Value Date    LDL 37 09/09/2024     Direct Measure HDL   Date Value Ref Range Status   09/09/2024 42 >=40 mg/dL Final   ]  GFR Estimate   Date Value Ref Range Status   03/29/2025 63 >60 mL/min/1.73m2 Final     Comment:     eGFR " "calculated using 2021 CKD-EPI equation.     No results found for: \"GFRESTBLACK\"  Lab Results   Component Value Date    CR 1.21 03/29/2025     No results found for: \"MICROALBUMIN\"    Healthy Eating:  Healthy Eating Assessed Today: Yes  Cultural/Restorationist diet restrictions?: No  Do you have any food allergies or intolerances?: No  Meal planning/habits: None  Who cooks/prepares meals for you?: Self, Spouse  Who purchases food in  your home?: Self, Spouse  Meals include: Breakfast, Dinner  Breakfast: oatmeal, walnuts, almond milk, coffee OR kaiser, eggs, protein shake (powder, banana, berries, flax seed, almond milk)  Dinner: chili, water OR fish, vegetable OR chicken pot pie (not often)      Monitoring:  Monitoring Assessed Today: Yes  Did patient bring glucose meter to appointment? : Yes  Blood Glucose Meter: Accu-chek  Times checking blood sugar at home (number): Other (see Comments)  Please elaborate:: has not been able to get meter working    115 today    Taking Medications:  Diabetes Medication(s)       Biguanides       metFORMIN (GLUCOPHAGE XR) 500 MG 24 hr tablet Take 1,000 mg by mouth 2 times daily (with meals)       Sulfonylureas       glipiZIDE (GLUCOTROL XL) 10 MG 24 hr tablet Take 10 mg by mouth daily (with breakfast).          Taking Medication Assessed Today: Yes  Current Treatments: Oral Medication (taken by mouth)  Problems taking diabetes medications regularly?: No  Diabetes medication side effects?: No    Catalina Calvo RD  Time Spent: 60 minutes  Encounter Type: Individual    Any diabetes medication dose changes were made via the CDCES Standing Orders under the patient's referring provider.  "

## 2025-04-02 NOTE — LETTER
4/2/2025         RE: Kaden Cain  [address] [place] [address]        Dear Colleague,    Thank you for referring your patient, Kaden Cain, to the [facility]. Please see a copy of my visit note below.    Diabetes Self-Management Education & Support    Presents for: Individual review    Type of Service: In Person Visit      Assessment  Patient presents for diabetes education following hospitalization for non-healing wound.  Works with PCP/Endocrinologist through [facility].  Unclear if he has seen a diabetes educator.  Patient states Endo ordered insulin yesterday.  He does not know type, dose, etc.  Was looking for instruction today, but unable to provide education without more details.  Patient has not been testing glucose due to confusion with how to use BG meter.  Provided instruction today.  Result = 115.  Diet recall reveals 2 meals daily, low protein intake.    Patient's most recent   Lab Results   Component Value Date    A1C 8.4 03/26/2025     is not meeting goal of <7.0    Diabetes knowledge and skills assessment:   Patient is knowledgeable in diabetes management concepts related to: none    Based on learning assessment above, most appropriate setting for further diabetes education would be: Individual setting.    Care Plan and Education Provided:  Healthy Eating: Balanced meals, Consistency in amount and timing of carbohydrate intake, and protein , Monitoring: Frequency of monitoring, Individual glucose targets, and Log and interpret results, and Taking Medication: Action of prescribed medication(s), Administering and storing injectable diabetes medications, Proper site selection and rotation for injections, Side effects of prescribed medication(s), and When to take medication(s)    Patient verbalized understanding of diabetes self-management education concepts discussed, opportunities for ongoing education and support, and recommendations provided  "today.    Plan    Test blood sugar 3x/day for the next 3-5 days.  Report #s to Endo before starting insulin.  Ask for diabetes education referral through [facility].  Include more protein foods/drinks - provided list.    Topics to cover at upcoming visits: Healthy Eating, Monitoring, Taking Medication, and Problem Solving    Follow-up:  Upcoming Diabetes Ed Appointments     Visit Type Date Time Department    DIABETES ED 4/2/2025 11:00 AM [facility]        See Care Plan for co-developed, patient-state behavior change goals.    Education Materials Provided:  - Blood Glucose Log Sheet   High Protein Nutrition Therapy      Subjective/Objective  Kaden is an 75 year old year old, presenting for the following diabetes education related to: Presents for: Individual review  Accompanied by: Self  Diabetes education in the past 24mo: Yes  Focus of Visit: Healthy Eating, Taking Medication  Diabetes type: Type 2  How confident are you filling out medical forms by yourself:: Not Assessed  Transportation concerns: No  Other concerns:: None  Cultural Influences/Ethnic Background:  Not  or       Diabetes Symptoms & Complications:  Diabetes Related Symptoms: Slow healing wounds  Weight trend: Stable  Complications assessed today?: No    Patient Problem List and Family Medical History reviewed for relevant medical history, current medical status, and diabetes risk factors.    Vitals:  There were no vitals taken for this visit.  Estimated body mass index is 26.36 kg/m  as calculated from the following:    Height as of 3/31/25: 1.854 m (6' 1\").    Weight as of 3/31/25: 90.6 kg (199 lb 12.8 oz).   Last 3 BP:   BP Readings from Last 3 Encounters:   03/31/25 127/71   03/29/25 134/68   12/09/24 (!) 148/64       History   Smoking Status     Never   Smokeless Tobacco     Never       Labs:  Lab Results   Component Value Date    A1C 8.4 03/26/2025     Lab Results   Component Value Date     03/29/2025     " "03/29/2025     Lab Results   Component Value Date    LDL 37 09/09/2024     Direct Measure HDL   Date Value Ref Range Status   09/09/2024 42 >=40 mg/dL Final   ]  GFR Estimate   Date Value Ref Range Status   03/29/2025 63 >60 mL/min/1.73m2 Final     Comment:     eGFR calculated using 2021 CKD-EPI equation.     No results found for: \"GFRESTBLACK\"  Lab Results   Component Value Date    CR 1.21 03/29/2025     No results found for: \"MICROALBUMIN\"    Healthy Eating:  Healthy Eating Assessed Today: Yes  Cultural/Quaker diet restrictions?: No  Do you have any food allergies or intolerances?: No  Meal planning/habits: None  Who cooks/prepares meals for you?: Self, Spouse  Who purchases food in  your home?: Self, Spouse  Meals include: Breakfast, Dinner  Breakfast: oatmeal, walnuts, almond milk, coffee OR kaiser, eggs, protein shake (powder, banana, berries, flax seed, almond milk)  Dinner: chili, water OR fish, vegetable OR chicken pot pie (not often)      Monitoring:  Monitoring Assessed Today: Yes  Did patient bring glucose meter to appointment? : Yes  Blood Glucose Meter: Accu-chek  Times checking blood sugar at home (number): Other (see Comments)  Please elaborate:: has not been able to get meter working    115 today    Taking Medications:  Diabetes Medication(s)       Biguanides       metFORMIN (GLUCOPHAGE XR) 500 MG 24 hr tablet Take 1,000 mg by mouth 2 times daily (with meals)       Sulfonylureas       glipiZIDE (GLUCOTROL XL) 10 MG 24 hr tablet Take 10 mg by mouth daily (with breakfast).          Taking Medication Assessed Today: Yes  Current Treatments: Oral Medication (taken by mouth)  Problems taking diabetes medications regularly?: No  Diabetes medication side effects?: No    Catalina Calvo RD  Time Spent: 60 minutes  Encounter Type: Individual    Any diabetes medication dose changes were made via the CDCES Standing Orders under the patient's referring provider.      "

## 2025-04-02 NOTE — PATIENT INSTRUCTIONS
MY DIABETES TODAY:    1)  Goal A1C is under <7.0  Mine is:      Lab Results   Component Value Date    A1C 8.4 03/26/2025       2)  Goal LDL (bad cholesterol) under 100  (measured at least yearly)- I am currently at:   Lab Results   Component Value Date    LDL 37 09/09/2024       3)  Goal blood pressure under 130/80- mine was Data Unavailable today    Care Plan:  Test blood sugar 3x/day for the next 3-5 days.  Report #s to Endo before starting insulin.  Ask for diabetes education referral through [facility].  Include more protein foods/drinks - provided list.    Follow up:  Call ([phone]), e-mail ([email or web address]), or send Taplisterhart message with questions, concerns or if follow-up is needed.     Bring blood glucose meter and logbook with you to all doctor and follow-up appointments.     [facility] for the [facility]:  For Your Diabetes or Nutrition Education Appointments Call:  [phone]   For Diabetes or Nutrition Related Questions:   [phone]  Send MyChart Message   If you need a medication refill please contact your pharmacy. Please allow 3 business days for your refills to be completed.

## 2025-04-03 ENCOUNTER — OFFICE VISIT (OUTPATIENT)
Dept: PODIATRY | Facility: CLINIC | Age: 75
End: 2025-04-03
Payer: MEDICARE

## 2025-04-03 VITALS — WEIGHT: 199.12 LBS | BODY MASS INDEX: 26.27 KG/M2

## 2025-04-03 DIAGNOSIS — E11.00 TYPE 2 DIABETES MELLITUS WITH HYPEROSMOLARITY WITHOUT COMA, WITHOUT LONG-TERM CURRENT USE OF INSULIN (H): Primary | ICD-10-CM

## 2025-04-03 DIAGNOSIS — L97.522 DIABETIC ULCER OF TOE OF LEFT FOOT ASSOCIATED WITH TYPE 2 DIABETES MELLITUS, WITH FAT LAYER EXPOSED (H): ICD-10-CM

## 2025-04-03 DIAGNOSIS — G62.9 POLYNEUROPATHY: ICD-10-CM

## 2025-04-03 DIAGNOSIS — E11.621 DIABETIC ULCER OF TOE OF LEFT FOOT ASSOCIATED WITH TYPE 2 DIABETES MELLITUS, WITH FAT LAYER EXPOSED (H): ICD-10-CM

## 2025-04-03 NOTE — LETTER
4/3/2025      Kaden Millarduer  [address] [place] [address]      Dear Colleague,    Thank you for referring your patient, Kaden Cain, to the [facility] PODI[facility]. Please see a copy of my visit note below.    ASSESSMENT:  Encounter Diagnoses   Name Primary?     Type 2 diabetes mellitus with hyperosmolarity without coma, without long-term current use of insulin (H) Yes     Polyneuropathy      Diabetic ulcer of toe of left foot associated with type 2 diabetes mellitus, with fat layer exposed (H)      MEDICAL DECISION MAKING:  The wound is stable and no clinical signs of infection.  Necrotic tissue noted and excisional debridement indicated.  See below    Ongoing wound care as per the direction of the wound healing [place], Dr. Medel  I encouraged follow-up at that facility within the month.    He is to complete the course of oral Augmentin.    He is to monitor for signs of infection.  His wife asked when do we know if its time for the toe to go.  I explained that deterioration of the soft tissues, development of deeper infection, or fatigue from long-term wound cares are all reasons to have the toe amputated.    For now, he is elected to try salvaging the toe.    Debridement Procedure:   The purpose and goals of excisional debridement were discussed, including removing nonviable tissue, reducing risk of infection, and promoting healing.  Verbal consent was obtained.    Technique: excisional debridement    Depth of debridement: Excisional debridement was carried down to the depth of, and including, the fat layer.    Location: left great toe    Instrument(s) used: #15 scalpel and tissue nippers    Wound Dimensions: 1.5cm diameter x 0.4cm deep to depth of fat layer    Type of Wound: neuropathic and secondary to infection and surgery    Progress Statement: stable. No clinical signs of infection    The procedure was tolerated well by the patient.  No anesthesia needed, due to  peripheral neuropathy.  Bacitracin and a light dressing was applied.        Disclaimer: This note consists of symbols derived from keyboarding, dictation and/or voice recognition software. As a result, there may be errors in the script that have gone undetected. Please consider this when interpreting information found in this chart.    Wilmer Prajapati DPM, FACJOSEPH, MS    [place] Department of Podiatry/Foot & Ankle Surgery          ____________________________________________________________________    HPI:       Kaden Cain follows up from recent hospitalization.  He was hospitalized from 3/26/2025 until 3/29/2025 for treatment of a left diabetic foot infection.  I took him to the operating room on 3/27/2025 for excisional debridement of the left great toe.  Amputation was offered yet MRI was negative for osteomyelitis.  He was treated with IV Zosyn and IV vancomycin.  Discharged on 14 days of p.o. Augmentin.  Followed up in wound care on 3/31/2025 with my partner Dr. Medel.  Wound cares and other detailed instructions provided.      INDICATIONS FOR SURGERY:  Kaden Cain is a pleasant 75 year-old male with a past medical history significant for type 2 diabetes, peripheral neuropathy, CAD with stents x 4, hypertension, hyperlipidemia admitted for a diabetic left foot infection largely involving the great toe.  Clinical exam suggests a significant infection, yet x-ray and MRI do not support underlying osteomyelitis.  Therefore excisional debridement, washout and exploration was recommended.  The procedure was discussed in detail.  Post op recovery scenarios were discussed.  I explained he might need additional surgery including toe amputation.  This will depend on how the soft tissues demarcate.  No guarantees were given.     *No past medical history on file.*  *  Past Surgical History:   Procedure Laterality Date     CV CORONARY ANGIOGRAM N/A 5/25/2024    Procedure: Coronary Angiogram;  Surgeon: Salomon Carrillo,  MD;  Location:  HEART CARDIAC CATH LAB     CV INTRAVASULAR ULTRASOUND N/A 5/25/2024    Procedure: Intravascular Ultrasound;  Surgeon: Salomon Carrillo MD;  Location: [facility] CARDIAC CATH LAB     CV PCI N/A 5/25/2024    Procedure: Percutaneous Coronary Intervention;  Surgeon: Salomon Carrillo MD;  Location: [facility] CARDIAC CATH LAB     IRRIGATION AND DEBRIDEMENT TOE, COMBINED Left 3/27/2025    Procedure: excisional debridement, left great toe, involving area greater than 20 cm .   Excisional debridement was carried down to, including, the deep fascia.;  Surgeon: Wilmer Prajapati DPM;  Location:  OR   *  *  Current Outpatient Medications   Medication Sig Dispense Refill     amoxicillin-clavulanate (AUGMENTIN) 875-125 MG tablet Take 1 tablet by mouth 2 times daily for 14 days. 28 tablet 0     aspirin 81 MG EC tablet Take 81 mg by mouth daily.       clopidogrel (PLAVIX) 75 MG tablet Take 75 mg by mouth daily.       glipiZIDE (GLUCOTROL XL) 10 MG 24 hr tablet Take 10 mg by mouth daily (with breakfast).       lisinopril (ZESTRIL) 40 MG tablet Take 1 tablet (40 mg) by mouth daily. 90 tablet 3     metFORMIN (GLUCOPHAGE XR) 500 MG 24 hr tablet Take 1,000 mg by mouth 2 times daily (with meals)       metoprolol tartrate (LOPRESSOR) 25 MG tablet Take 1 tablet (25 mg) by mouth 2 times daily 60 tablet 0     nitroGLYcerin (NITROSTAT) 0.4 MG sublingual tablet For chest pain place 1 tablet under the tongue every 5 minutes for 3 doses. If symptoms persist 5 minutes after 1st dose call 911. 15 tablet 0     rosuvastatin (CRESTOR) 20 MG tablet Take 1 tablet (20 mg) by mouth at bedtime 30 tablet 0     gabapentin (NEURONTIN) 100 MG capsule Take 1 capsule (100 mg) by mouth 3 times daily. (Patient not taking: Reported on 4/3/2025) 90 capsule 0         EXAM:    Vitals: Wt 90.3 kg (199 lb 1.9 oz)   BMI 26.27 kg/m    BMI: Body mass index is 26.27 kg/m .      Vasc:      Significant edema of the left great toe yet slightly less than  when hospitalized  Pedal pulses are palpable for the dorsalis pedis posterior tibial artery, bilateral foot.  Capillary fill time </= 3 seconds  Pedal skin appears well-perfused  Neuro:      Light touch sensation profoundly diminished to all sensory nerve distributions, bilateral foot.  No apparent spastic contractures or other deformity secondary to neurologic compromise.  Derm:      2 wounds on the dorsal medial aspect of the left hallux a large proximal wound and smaller distal wound.    Desquamating skin on the toe.  Interval lightening of erythema.  The more proximal wound measures 1.5 cm in diameter by 0.4 cm depth to the fat layer.  Prior to excisional debridement, the wound bed was necrotic.  No malodor or purulence.    MSK:      Bilateral lower extremity muscle strength presents is normal.  No gross deformities  Adequate ankle and subtalar joint range of motion  Calf:    Neg for redness, swelling or tenderness        Again, thank you for allowing me to participate in the care of your patient.        Sincerely,        Wilmer Prajapati DPM    Electronically signed

## 2025-04-03 NOTE — PATIENT INSTRUCTIONS
Thank you for choosing [facility]!    DR. WHELAN'S CLINIC LOCATIONS:     [facility] TRIAGE LINE: [phone]   [address] APPOINTMENTS: [phone]   [place] [address] RADIOLOGY: [phone]   (Every other Tues - Wed - Fri PM) SET UP SURGERY: [phone]    PHYSICAL THERAPY: [phone]   [facility] BILLING QUESTIONS: [phone] [id number] FAX: [phone]   [place] [address]    (Thurs & Fri AM)       See wound center within month    SIGNS OF INFECTION  expanding redness around the wound   yellow or greenish-colored pus or cloudy wound drainage   red streaking spreading from the wound   increased swelling, tenderness, or pain around the wound   fever  *If you notice any of these signs of infection, call us right away!

## 2025-04-04 ENCOUNTER — MEDICAL CORRESPONDENCE (OUTPATIENT)
Dept: HEALTH INFORMATION MANAGEMENT | Facility: CLINIC | Age: 75
End: 2025-04-04
Payer: MEDICARE

## 2025-04-17 ENCOUNTER — OFFICE VISIT (OUTPATIENT)
Dept: PODIATRY | Facility: CLINIC | Age: 75
End: 2025-04-17
Payer: MEDICARE

## 2025-04-17 VITALS — BODY MASS INDEX: 26.25 KG/M2 | WEIGHT: 199 LBS

## 2025-04-17 DIAGNOSIS — Z09 SURGERY FOLLOW-UP EXAMINATION: Primary | ICD-10-CM

## 2025-04-17 DIAGNOSIS — E11.621 DIABETIC ULCER OF TOE OF LEFT FOOT ASSOCIATED WITH TYPE 2 DIABETES MELLITUS, WITH FAT LAYER EXPOSED (H): ICD-10-CM

## 2025-04-17 DIAGNOSIS — E11.00 TYPE 2 DIABETES MELLITUS WITH HYPEROSMOLARITY WITHOUT COMA, WITHOUT LONG-TERM CURRENT USE OF INSULIN (H): ICD-10-CM

## 2025-04-17 DIAGNOSIS — L97.522 DIABETIC ULCER OF TOE OF LEFT FOOT ASSOCIATED WITH TYPE 2 DIABETES MELLITUS, WITH FAT LAYER EXPOSED (H): ICD-10-CM

## 2025-04-17 DIAGNOSIS — G62.9 POLYNEUROPATHY: ICD-10-CM

## 2025-04-17 NOTE — LETTER
4/17/2025      Kaden Cain  [address] [place] [address]      Dear Colleague,    Thank you for referring your patient, Kaden Cain, to the [facility] PODIATRY. Please see a copy of my visit note below.    ASSESSMENT:  Encounter Diagnoses   Name Primary?     Surgery follow-up examination Yes     Diabetic ulcer of toe of left foot associated with type 2 diabetes mellitus, with fat layer exposed (H)      Polyneuropathy      Type 2 diabetes mellitus with hyperosmolarity without coma, without long-term current use of insulin (H)      MEDICAL DECISION MAKING:  Both wounds are healing.    The toe is stable and appears healthier in general.  The dull erythema is more likely related to the residual edema rather than infection.  No indication for any additional antibiotics at this time.  Excisional debridement indicated.  We reviewed basic wound cares including daily cleansing (after bathing), blot dry, wound gel, and gauze dressing.  Recommend he keep activity low.  He has returned to an athletic shoe.  If the plantar wound does not show any significant healing in 3 weeks, we might need better offloading.    No new findings.  No change in care plan.      Debridement Procedure:   The purpose and goals of excisional debridement were discussed, including removing nonviable tissue, reducing risk of infection, and promoting healing.  Verbal consent was obtained.    Technique: excisional debridement    Depth of debridement: Excisional debridement was carried down to the depth of, and including, the fat layer.    Location: left great toe    Instrument(s) used: #15 scalpel     Wound Dimensions: Proximal wound is 1.5 cm in diameter by 0.4cm centimeters in depth to the fat layer.  More distal wound is 1.5 cm in length by 0.5 cm in width by 0.3 cm in depth to the fat layer.    Type of Wound: Neuropathic     Progress Statement: In general, interval healing of the left great toe.  Soft tissue over  the past year.    The procedure was tolerated well by the patient.  No anesthesia needed, due to peripheral neuropathy.  Bacitracin and a light dressing was applied.              Disclaimer: This note consists of symbols derived from keyboarding, dictation and/or voice recognition software. As a result, there may be errors in the script that have gone undetected. Please consider this when interpreting information found in this chart.    Wilmer Prajapati, MARLON, FACFAS, MS    [place] Department of Podiatry/Foot & Ankle Surgery      ____________________________________________________________________    HPI:       Kaden Cain follows up from recent hospitalization.  He was hospitalized from 3/26/2025 until 3/29/2025 for treatment of a left diabetic foot infection.  I took him to the operating room on 3/27/2025 for excisional debridement of the left great toe.  Amputation was offered yet MRI was negative for osteomyelitis.  He was treated with IV Zosyn and IV vancomycin.  Discharged on 14 days of p.o. Augmentin.  Followed up in wound care on 3/31/2025 with my partner Dr. Medel.  Wound cares and other detailed instructions provided.       INDICATIONS FOR SURGERY:  Kadne Cain is a pleasant 75 year-old male with a past medical history significant for type 2 diabetes, peripheral neuropathy, CAD with stents x 4, hypertension, hyperlipidemia admitted for a diabetic left foot infection largely involving the great toe.  Clinical exam suggests a significant infection, yet x-ray and MRI do not support underlying osteomyelitis.  Therefore excisional debridement, washout and exploration was recommended.  The procedure was discussed in detail.  Post op recovery scenarios were discussed.  I explained he might need additional surgery including toe amputation.  This will depend on how the soft tissues demarcate.  No guarantees were given.      *No past medical history on file.*  *  Past Surgical History:   Procedure Laterality Date      CV CORONARY ANGIOGRAM N/A 5/25/2024    Procedure: Coronary Angiogram;  Surgeon: Salomon Carrillo MD;  Location:  HEART CARDIAC CATH LAB     CV INTRAVASULAR ULTRASOUND N/A 5/25/2024    Procedure: Intravascular Ultrasound;  Surgeon: Salomon Carrillo MD;  Location: [facility] CARDIAC CATH LAB     CV PCI N/A 5/25/2024    Procedure: Percutaneous Coronary Intervention;  Surgeon: Salomon Carrillo MD;  Location: [facility] CARDIAC CATH LAB     IRRIGATION AND DEBRIDEMENT TOE, COMBINED Left 3/27/2025    Procedure: excisional debridement, left great toe, involving area greater than 20 cm .   Excisional debridement was carried down to, including, the deep fascia.;  Surgeon: Wilmer Prajapati DPM;  Location:  OR   *  *  Current Outpatient Medications   Medication Sig Dispense Refill     aspirin 81 MG EC tablet Take 81 mg by mouth daily.       clopidogrel (PLAVIX) 75 MG tablet Take 75 mg by mouth daily.       glipiZIDE (GLUCOTROL XL) 10 MG 24 hr tablet Take 10 mg by mouth daily (with breakfast).       lisinopril (ZESTRIL) 40 MG tablet Take 1 tablet (40 mg) by mouth daily. 90 tablet 3     metFORMIN (GLUCOPHAGE XR) 500 MG 24 hr tablet Take 1,000 mg by mouth 2 times daily (with meals)       metoprolol tartrate (LOPRESSOR) 25 MG tablet Take 1 tablet (25 mg) by mouth 2 times daily 60 tablet 0     nitroGLYcerin (NITROSTAT) 0.4 MG sublingual tablet For chest pain place 1 tablet under the tongue every 5 minutes for 3 doses. If symptoms persist 5 minutes after 1st dose call 911. 15 tablet 0     rosuvastatin (CRESTOR) 20 MG tablet Take 1 tablet (20 mg) by mouth at bedtime 30 tablet 0     gabapentin (NEURONTIN) 100 MG capsule Take 1 capsule (100 mg) by mouth 3 times daily. (Patient not taking: Reported on 4/3/2025) 90 capsule 0         EXAM:    Vitals: Wt 90.3 kg (199 lb)   BMI 26.25 kg/m    BMI: Body mass index is 26.25 kg/m .    Vasc:      Significant edema of the left great toe yet slightly less than when hospitalized  Pedal  pulses are palpable for the dorsalis pedis posterior tibial artery, bilateral foot.  Capillary fill time </= 3 seconds  Pedal skin appears well-perfused  Neuro:      Light touch sensation profoundly diminished to all sensory nerve distributions, bilateral foot.  No apparent spastic contractures or other deformity secondary to neurologic compromise.  Derm:      2 wounds on the left hallux.  Interval lightening of erythema.  The more proximal wound measures 1.5 cm in diameter by 0.4 cm depth to the fat layer.  Minimal hyperkeratotic eschar around the margins.   The base is a mix of granular tissue and fibrotic tissue.      The more distal plantar wound measures 1.5 cm in length by 0.5 cm in width by 0.3 cm in depth.  This is considered to be to the depth of the fat layer.  Granular base no clinical signs of infection    MSK:      Bilateral lower extremity muscle strength presents is normal.  No gross deformities  Adequate ankle and subtalar joint range of motion  Calf:    Neg for redness, swelling or tenderness        Again, thank you for allowing me to participate in the care of your patient.        Sincerely,        Wilmer Prajapati DPM    Electronically signed

## 2025-04-17 NOTE — PROGRESS NOTES
ASSESSMENT:  Encounter Diagnoses   Name Primary?    Surgery follow-up examination Yes    Diabetic ulcer of toe of left foot associated with type 2 diabetes mellitus, with fat layer exposed (H)     Polyneuropathy     Type 2 diabetes mellitus with hyperosmolarity without coma, without long-term current use of insulin (H)      MEDICAL DECISION MAKING:  Both wounds are healing.    The toe is stable and appears healthier in general.  The dull erythema is more likely related to the residual edema rather than infection.  No indication for any additional antibiotics at this time.  Excisional debridement indicated.  We reviewed basic wound cares including daily cleansing (after bathing), blot dry, wound gel, and gauze dressing.  Recommend he keep activity low.  He has returned to an athletic shoe.  If the plantar wound does not show any significant healing in 3 weeks, we might need better offloading.    No new findings.  No change in care plan.      Debridement Procedure:   The purpose and goals of excisional debridement were discussed, including removing nonviable tissue, reducing risk of infection, and promoting healing.  Verbal consent was obtained.    Technique: excisional debridement    Depth of debridement: Excisional debridement was carried down to the depth of, and including, the fat layer.    Location: left great toe    Instrument(s) used: #15 scalpel     Wound Dimensions: Proximal wound is 1.5 cm in diameter by 0.4cm centimeters in depth to the fat layer.  More distal wound is 1.5 cm in length by 0.5 cm in width by 0.3 cm in depth to the fat layer.    Type of Wound: Neuropathic     Progress Statement: In general, interval healing of the left great toe.  Soft tissue over the past year.    The procedure was tolerated well by the patient.  No anesthesia needed, due to peripheral neuropathy.  Bacitracin and a light dressing was applied.              Disclaimer: This note consists of symbols derived from keyboarding,  dictation and/or voice recognition software. As a result, there may be errors in the script that have gone undetected. Please consider this when interpreting information found in this chart.    Wilmer Prajapati DPM, FACFAS, MS    [facility]le [facility]      ____________________________________________________________________    HPI:       Kaden Cain follows up from recent hospitalization.  He was hospitalized from 3/26/2025 until 3/29/2025 for treatment of a left diabetic foot infection.  I took him to the operating room on 3/27/2025 for excisional debridement of the left great toe.  Amputation was offered yet MRI was negative for osteomyelitis.  He was treated with IV Zosyn and IV vancomycin.  Discharged on 14 days of p.o. Augmentin.  Followed up in wound care on 3/31/2025 with my partner Dr. Medel.  Wound cares and other detailed instructions provided.       INDICATIONS FOR SURGERY:  Kaden Cain is a pleasant 75 year-old male with a past medical history significant for type 2 diabetes, peripheral neuropathy, CAD with stents x 4, hypertension, hyperlipidemia admitted for a diabetic left foot infection largely involving the great toe.  Clinical exam suggests a significant infection, yet x-ray and MRI do not support underlying osteomyelitis.  Therefore excisional debridement, washout and exploration was recommended.  The procedure was discussed in detail.  Post op recovery scenarios were discussed.  I explained he might need additional surgery including toe amputation.  This will depend on how the soft tissues demarcate.  No guarantees were given.      *No past medical history on file.*  *  Past Surgical History:   Procedure Laterality Date    CV CORONARY ANGIOGRAM N/A 5/25/2024    Procedure: Coronary Angiogram;  Surgeon: Salomon Carrillo MD;  Location:  HEART CARDIAC CATH LAB    CV INTRAVASULAR ULTRASOUND N/A 5/25/2024    Procedure: Intravascular Ultrasound;  Surgeon: Salomon Carrillo  MD [place];  Location: [facility] CARDIAC CATH LAB    CV PCI N/A 5/25/2024    Procedure: Percutaneous Coronary Intervention;  Surgeon: Salomon Carrillo MD;  Location: [facility] CARDIAC CATH LAB    IRRIGATION AND DEBRIDEMENT TOE, COMBINED Left 3/27/2025    Procedure: excisional debridement, left great toe, involving area greater than 20 cm .   Excisional debridement was carried down to, including, the deep fascia.;  Surgeon: Wilmer Prajapati DPM;  Location:  OR   *  *  Current Outpatient Medications   Medication Sig Dispense Refill    aspirin 81 MG EC tablet Take 81 mg by mouth daily.      clopidogrel (PLAVIX) 75 MG tablet Take 75 mg by mouth daily.      glipiZIDE (GLUCOTROL XL) 10 MG 24 hr tablet Take 10 mg by mouth daily (with breakfast).      lisinopril (ZESTRIL) 40 MG tablet Take 1 tablet (40 mg) by mouth daily. 90 tablet 3    metFORMIN (GLUCOPHAGE XR) 500 MG 24 hr tablet Take 1,000 mg by mouth 2 times daily (with meals)      metoprolol tartrate (LOPRESSOR) 25 MG tablet Take 1 tablet (25 mg) by mouth 2 times daily 60 tablet 0    nitroGLYcerin (NITROSTAT) 0.4 MG sublingual tablet For chest pain place 1 tablet under the tongue every 5 minutes for 3 doses. If symptoms persist 5 minutes after 1st dose call 911. 15 tablet 0    rosuvastatin (CRESTOR) 20 MG tablet Take 1 tablet (20 mg) by mouth at bedtime 30 tablet 0    gabapentin (NEURONTIN) 100 MG capsule Take 1 capsule (100 mg) by mouth 3 times daily. (Patient not taking: Reported on 4/3/2025) 90 capsule 0         EXAM:    Vitals: Wt 90.3 kg (199 lb)   BMI 26.25 kg/m    BMI: Body mass index is 26.25 kg/m .    Vasc:      Significant edema of the left great toe yet slightly less than when hospitalized  Pedal pulses are palpable for the dorsalis pedis posterior tibial artery, bilateral foot.  Capillary fill time </= 3 seconds  Pedal skin appears well-perfused  Neuro:      Light touch sensation profoundly diminished to all sensory nerve distributions, bilateral  foot.  No apparent spastic contractures or other deformity secondary to neurologic compromise.  Derm:      2 wounds on the left hallux.  Interval lightening of erythema.  The more proximal wound measures 1.5 cm in diameter by 0.4 cm depth to the fat layer.  Minimal hyperkeratotic eschar around the margins.   The base is a mix of granular tissue and fibrotic tissue.      The more distal plantar wound measures 1.5 cm in length by 0.5 cm in width by 0.3 cm in depth.  This is considered to be to the depth of the fat layer.  Granular base no clinical signs of infection    MSK:      Bilateral lower extremity muscle strength presents is normal.  No gross deformities  Adequate ankle and subtalar joint range of motion  Calf:    Neg for redness, swelling or tenderness

## 2025-05-05 ENCOUNTER — OFFICE VISIT (OUTPATIENT)
Dept: CARDIOLOGY | Facility: CLINIC | Age: 75
End: 2025-05-05
Payer: MEDICARE

## 2025-05-05 VITALS
WEIGHT: 192 LBS | HEIGHT: 74 IN | BODY MASS INDEX: 24.64 KG/M2 | DIASTOLIC BLOOD PRESSURE: 60 MMHG | HEART RATE: 56 BPM | SYSTOLIC BLOOD PRESSURE: 102 MMHG

## 2025-05-05 DIAGNOSIS — I21.4 NSTEMI (NON-ST ELEVATED MYOCARDIAL INFARCTION) (H): ICD-10-CM

## 2025-05-05 PROCEDURE — 3074F SYST BP LT 130 MM HG: CPT | Performed by: INTERNAL MEDICINE

## 2025-05-05 PROCEDURE — 3078F DIAST BP <80 MM HG: CPT | Performed by: INTERNAL MEDICINE

## 2025-05-05 PROCEDURE — 99214 OFFICE O/P EST MOD 30 MIN: CPT | Performed by: INTERNAL MEDICINE

## 2025-05-05 RX ORDER — CLOPIDOGREL BISULFATE 75 MG/1
75 TABLET ORAL DAILY
Qty: 90 TABLET | Refills: 3 | Status: SHIPPED | OUTPATIENT
Start: 2025-05-05

## 2025-05-05 RX ORDER — METOPROLOL TARTRATE 25 MG/1
12.5 TABLET, FILM COATED ORAL 2 TIMES DAILY
Qty: 60 TABLET | Refills: 0 | Status: SHIPPED | OUTPATIENT
Start: 2025-05-05

## 2025-05-05 NOTE — LETTER
5/5/2025    [facility]  [address] [address]    RE: Kaden Cain       Dear Colleague,     I had the pleasure of seeing Kaden JD Cain in the [facility].  CARDIOLOGY CLINIC CONSULTATION    PRIMARY CARE PHYSICIAN:  [facility]    HISTORY OF PRESENT ILLNESS:  This is a very pleasant 75-year-old gentleman who was seen by me in inpatient consultation in May 2024 when he presented with non-ST elevation myocardial infarction noted to have an EF of 50 to 55% and lateral wall motion abnormality.  Coronary angiography at that time showed disease in the LAD and circumflex and both those vessels were stented with a total of 4 drug-eluting stents.  He has been on dual antiplatelet therapy statin.  His LDL is responded nicely.  Last LDL was 37.  EF is 50 to 55%.     Patient is here for routine cardiology follow-up.  Denies any new cardiovascular symptoms.  No angina or heart failure.  His blood pressure and heart rate have been running low now.  Currently he takes 40 mg daily of lisinopril.  He is on metoprolol tartrate 25 twice daily.    PAST MEDICAL HISTORY:  No past medical history on file.    MEDICATIONS:  Current Outpatient Medications   Medication Sig Dispense Refill     glipiZIDE (GLUCOTROL XL) 10 MG 24 hr tablet Take 10 mg by mouth daily (with breakfast).       lisinopril (ZESTRIL) 40 MG tablet Take 1 tablet (40 mg) by mouth daily. 90 tablet 3     metFORMIN (GLUCOPHAGE XR) 500 MG 24 hr tablet Take 1,000 mg by mouth 2 times daily (with meals)       nitroGLYcerin (NITROSTAT) 0.4 MG sublingual tablet For chest pain place 1 tablet under the tongue every 5 minutes for 3 doses. If symptoms persist 5 minutes after 1st dose call 911. 15 tablet 0     rosuvastatin (CRESTOR) 20 MG tablet Take 1 tablet (20 mg) by mouth at bedtime 30 tablet 0     clopidogrel (PLAVIX) 75 MG tablet Take 1 tablet (75 mg) by mouth daily. 90 tablet 3     gabapentin (NEURONTIN) 100 MG capsule  Take 1 capsule (100 mg) by mouth 3 times daily. (Patient not taking: Reported on 4/3/2025) 90 capsule 0     metoprolol tartrate (LOPRESSOR) 25 MG tablet Take 0.5 tablets (12.5 mg) by mouth 2 times daily. 60 tablet 0     No current facility-administered medications for this visit.       SOCIAL HISTORY:  I have reviewed this patient's social history and updated it with pertinent information if needed. Kaden Cain  reports that he has never smoked. He has never used smokeless tobacco. He reports that he does not currently use alcohol.    PHYSICAL EXAM:  Pulse:  [56] 56  BP: (102)/(60) 102/60  192 lbs 0 oz    Constitutional: alert, no distress  Respiratory: Good bilateral air entry  Cardiovascular: Normal regular heart sounds  GI: nondistended  Neuropsychiatric: appropriate affact    ASSESSMENT: Pertinent issues addressed/ reviewed during this cardiology visit  Stable coronary artery disease  Low blood pressures and heart rate    RECOMMENDATIONS:  Patient is asymptomatic from a cardiac standpoint.  No angina heart failure symptoms syncope presyncope.  In regards to coronary disease, he will stop his aspirin therapy end of May 2025.  Given multiple stents, I recommend continuing Plavix monotherapy long-term.  I have refilled his Plavix.  Given his heart rate and blood pressure are little low, I recommend reducing metoprolol dosing down to 12.5 mg twice daily.    Follow-up routinely in cardiology clinic in a year from now sooner if anything changes clinically.  Echo next year.    It was a pleasure seeing this patient in clinic today. Please do not hesitate to contact me with any future questions.     BHARGAVI Larsen, [facility]  Cardiology - [facility]  May 5, 2025    Review of the result(s) of each unique test - Last CBC BMP echocardiogram lipids     The level of medical decision making during this visit was of moderate complexity.    This note was completed in part using dictation via the Dragon voice recognition  software. Some word and grammatical errors may occur and must be interpreted in the appropriate clinical context.  If there are any questions pertaining to this issue, please contact me for further clarification.      Thank you for allowing me to participate in the care of your patient.      Sincerely,     Master Burns MD     [facility]  cc:   No referring provider defined for this encounter.

## 2025-05-05 NOTE — PROGRESS NOTES
CARDIOLOGY CLINIC CONSULTATION    PRIMARY CARE PHYSICIAN:  [facility]    HISTORY OF PRESENT ILLNESS:  This is a very pleasant 75-year-old gentleman who was seen by me in inpatient consultation in May 2024 when he presented with non-ST elevation myocardial infarction noted to have an EF of 50 to 55% and lateral wall motion abnormality.  Coronary angiography at that time showed disease in the LAD and circumflex and both those vessels were stented with a total of 4 drug-eluting stents.  He has been on dual antiplatelet therapy statin.  His LDL is responded nicely.  Last LDL was 37.  EF is 50 to 55%.     Patient is here for routine cardiology follow-up.  Denies any new cardiovascular symptoms.  No angina or heart failure.  His blood pressure and heart rate have been running low now.  Currently he takes 40 mg daily of lisinopril.  He is on metoprolol tartrate 25 twice daily.    PAST MEDICAL HISTORY:  No past medical history on file.    MEDICATIONS:  Current Outpatient Medications   Medication Sig Dispense Refill    glipiZIDE (GLUCOTROL XL) 10 MG 24 hr tablet Take 10 mg by mouth daily (with breakfast).      lisinopril (ZESTRIL) 40 MG tablet Take 1 tablet (40 mg) by mouth daily. 90 tablet 3    metFORMIN (GLUCOPHAGE XR) 500 MG 24 hr tablet Take 1,000 mg by mouth 2 times daily (with meals)      nitroGLYcerin (NITROSTAT) 0.4 MG sublingual tablet For chest pain place 1 tablet under the tongue every 5 minutes for 3 doses. If symptoms persist 5 minutes after 1st dose call 911. 15 tablet 0    rosuvastatin (CRESTOR) 20 MG tablet Take 1 tablet (20 mg) by mouth at bedtime 30 tablet 0    clopidogrel (PLAVIX) 75 MG tablet Take 1 tablet (75 mg) by mouth daily. 90 tablet 3    gabapentin (NEURONTIN) 100 MG capsule Take 1 capsule (100 mg) by mouth 3 times daily. (Patient not taking: Reported on 4/3/2025) 90 capsule 0    metoprolol tartrate (LOPRESSOR) 25 MG tablet Take 0.5 tablets (12.5 mg) by mouth 2 times daily. 60 tablet 0      No current facility-administered medications for this visit.       SOCIAL HISTORY:  I have reviewed this patient's social history and updated it with pertinent information if needed. Kaden Cain  reports that he has never smoked. He has never used smokeless tobacco. He reports that he does not currently use alcohol.    PHYSICAL EXAM:  Pulse:  [56] 56  BP: (102)/(60) 102/60  192 lbs 0 oz    Constitutional: alert, no distress  Respiratory: Good bilateral air entry  Cardiovascular: Normal regular heart sounds  GI: nondistended  Neuropsychiatric: appropriate affact    ASSESSMENT: Pertinent issues addressed/ reviewed during this cardiology visit  Stable coronary artery disease  Low blood pressures and heart rate    RECOMMENDATIONS:  Patient is asymptomatic from a cardiac standpoint.  No angina heart failure symptoms syncope presyncope.  In regards to coronary disease, he will stop his aspirin therapy end of May 2025.  Given multiple stents, I recommend continuing Plavix monotherapy long-term.  I have refilled his Plavix.  Given his heart rate and blood pressure are little low, I recommend reducing metoprolol dosing down to 12.5 mg twice daily.    Follow-up routinely in cardiology clinic in a year from now sooner if anything changes clinically.  Echo next year.    It was a pleasure seeing this patient in clinic today. Please do not hesitate to contact me with any future questions.     BHARGAVI Larsen, [facility]  Cardiology - [facility] Heart  May 5, 2025    Review of the result(s) of each unique test - Last CBC BMP echocardiogram lipids     The level of medical decision making during this visit was of moderate complexity.    This note was completed in part using dictation via the Dragon voice recognition software. Some word and grammatical errors may occur and must be interpreted in the appropriate clinical context.  If there are any questions pertaining to this issue, please contact me for further clarification.

## 2025-05-08 ENCOUNTER — OFFICE VISIT (OUTPATIENT)
Dept: PODIATRY | Facility: CLINIC | Age: 75
End: 2025-05-08
Payer: MEDICARE

## 2025-05-08 ENCOUNTER — TELEPHONE (OUTPATIENT)
Dept: WOUND CARE | Facility: CLINIC | Age: 75
End: 2025-05-08

## 2025-05-08 VITALS — BODY MASS INDEX: 24.64 KG/M2 | HEIGHT: 74 IN | WEIGHT: 192 LBS

## 2025-05-08 DIAGNOSIS — E11.00 TYPE 2 DIABETES MELLITUS WITH HYPEROSMOLARITY WITHOUT COMA, WITHOUT LONG-TERM CURRENT USE OF INSULIN (H): ICD-10-CM

## 2025-05-08 DIAGNOSIS — G62.9 POLYNEUROPATHY: ICD-10-CM

## 2025-05-08 DIAGNOSIS — E11.621 DIABETIC ULCER OF TOE OF LEFT FOOT ASSOCIATED WITH TYPE 2 DIABETES MELLITUS, WITH FAT LAYER EXPOSED (H): Primary | ICD-10-CM

## 2025-05-08 DIAGNOSIS — L97.522 DIABETIC ULCER OF TOE OF LEFT FOOT ASSOCIATED WITH TYPE 2 DIABETES MELLITUS, WITH FAT LAYER EXPOSED (H): Primary | ICD-10-CM

## 2025-05-08 NOTE — PATIENT INSTRUCTIONS
"Thank you for choosing [facility] Podiatry / Foot & Ankle Surgery!    DR. WHELAN'S CLINIC LOCATIONS:     [facility] TRIAGE LINE: [phone]   [address] [place] [address] APPOINTMENTS: [phone]   [place] [address] RADIOLOGY: [phone]   (Every other Tues - Wed - Fri PM) SET UP SURGERY: [phone]    PHYSICAL THERAPY: [phone]   [place] SPECIALTY BILLING QUESTIONS: [phone]   [address] FAX: [phone]   [place] [address]    (Thurs & Fri AM)        [facility] ORTHOTICS LOCATIONS  [facility] [place] [id number] [facility] [address]  [place] [address]  Phone: [phone]  Fax: [phone] [facility]   [address]  [place] [address]  Phone: [phone]  Fax: [phone]   [facility] [place]  [address]  [place] [address]  Phone: [phone]  Fax: [phone] [facility]  [address] [facility] [address]  [place] [address]  Phone: [phone]   Fax: [phone]   * Please call any location listed to make an appointment for a casting/fitting. Your referral was sent to their central office and they will all have the order on file.       DIABETES AND YOUR FEET  Diabetes can result in several problems in the feet including ulcers (open sores) and amputations. Two of the most important reasons why people develop foot problems when they have diabetes is : 1. Neuropathy (loss of feeling)  2. Vascular disease (loss or decrease of blood flow).    Neuropathy is a term used to describe a loss of nerve function.  Patients with diabetes are at risk of developing neuropathy if their sugars continue to run high and are above the normal value. One theory for neuropathy is that the \"extra\" sugar in the body enters the nerves and is broken down. These by-products build up in the nerve causing it to swell and impairing nerve function. Often times, this can be prevented by controlling " your sugars, dieting and exercise.    When a person develops neuropathy, they usually begin to feel numbness or tingling in their feet and sometime in their legs.  Other symptoms may include painful burning or hot feet, tingling or feeling like insects or ants are crawling on your feet or legs.  If the diabetes is sever and the sugars run high for long periods of time, neuropathy can also occur in the hands.    Vascular disease  is a term used to describe a loss or decrease in circulation (blood flow). There is a problem in getting blood and oxygen to areas that need it. Similar to neuropathy, sugars can build up in the walls of the arteries (blood vessels) and cause them to become swollen, thickened and hardened. This decreases the amount of blood that can go to an area that needs it. Though this is common in the legs of diabetic patients, it can also affect other arteries (blood vessels) in the body such as in the heart and eyes.    In the legs, vascular disease usually results in cramping. Patients who develop leg cramps after walking the same distance every time (i.e. One block, half a mile, ect.) need to let their doctors know so that their circulation may be checked. Cramps causing severe pain in the feet and/or legs while sleeping and the cramps go away when you stand or hang your legs off the side of the bed, may also be a sign of poor blood circulation.  Occasional cramping in cold weather or on rare occasions with activity may not be due to poor circulation, but you should inform your doctor.    PREVENTION OF THESE DISEASES  The key to prevention is good blood sugar control. Poor blood sugar control is a big reason many of these problems start. Physical activity (exercise) is a very good way to help decrease your blood sugars. Exercise can lower your blood sugar, blood pressure, and cholesterol. It also reduces your risk for heart disease and stroke, relieves stress, and strengthens your heart, muscles and  "bones.  In addition, regular activity helps insulin work better, improves your blood circulation, and keeps your joints flexible. If you're trying to lose weight, a combination of exercise and wise food choices can help you reach your target weight and maintain it.      PAIN MANAGEMENT (**Please speak with your primary doctor about any medications**)  1.Blood Sugar Control - Most important  2. Medications such as:  Amytriptylline, duloxetine, gabapentin, lyrica, tramadol (talk with your primary care doctor about this).     NUTRITION:  Nutrition is also important to help with healing. If your body does not have what it needs, it can't heal.   Increasing your protein intake is important.  With wounds you need 60-90gm of protein a day to help with healing. Over the counter protein shakes such as Adrian, Glucerna, Ensure, ect... can help to supplement your daily protein intake.   It is also important to take Vitamins to help with healing.  Vitamins such as B12, B6 and Vitamin D3 are important for healing. These can be gotten over the counter at pharmacies or at stores like [employer] or the [employer].    I can also prescribe a dietary supplement called \"Rheumate\" that has a lot of essential vitamins in one capsule.  This may not be covered by insurance though.     FOOT CARE RECOMMENDATIONS   1. Wash your feet with lukewarm water and a mild soap and then dry them thoroughly, especially between the toes.     2. Examine your feet daily looking for cuts, corns, blisters, cracks, ect, especially after wearing new shoes. Make sure to look between your toes. If you cannot see the bottom of your feet, set a mirror on the floor and hold your foot over it, or ask a spouse, friend or family member to examine your feet for you. Contact your doctor immediately if new problems are noted or if sores are not healing.     3. Immediately apply moisturizer to the tops and bottoms of your feet, avoiding areas between the toes. Hand lotion " (Intesive Care, Evonne, Eucerin, Neutrogena, Curel, ect) is sufficient unless your doctor prescribes a medicated lotion. Apply sunscreen to your feet when going swimming outside.     4. Use clean comfortable shoes, wear white socks (if you have any bleeding or drainage, you will see it on white socks). Socks should not have thick seams or cut off the circulation around the leg. Break in new shoes slowly and rotate with older shoes until broken in. Check the inside of your shoes with your hand to look for areas of irritation or objects that may have fallen into your shoes.       5. Keep slippers by the side of your bed for use during the night.     6.  Shoes should be fitted by a professional and should not cause areas of irritation.  Check your feet regularly when wearing a new pair of shoes and replace them as needed.     7.  Talk to your doctor about proper exercise. Exercise and stretching stimulate blood flow to your feet and maintain proper glucose levels.     8.  Monitor your blood glucose level as instructed by your doctor. Notify your doctor immediately if your blood sugar is abnormally high or low.    9. Cut your nails straight across, but then gently round any sharp edges with a cardboard nail file. If you have neuropathy, peripheral vascular disease or cannot see that well to trim your own toenails contact [employer] ([phone]) or [employer] ([phone]).      THINGS TO AVOID DOING   1.  Do not soak your feet if you have an open sore. Use only lukewarm water and always check the temperature with your hand as hot water can easily burn your feet.       2.  Never use a hot water bottle or heating pad on your feet. Also do not apply cold compresses to your feet. With decreased sensation, you could burn or freeze your feet.       3.  Do not apply any of these to your feet:    -  Over the counter medicine for corns or warts    -  Harsh chemicals like boric acid    -  Do not self-treat corns, cuts,  blisters or infections. Always consult your doctor.       4.  Do not wear sandals, slippers or walk barefoot, especially on hot sand or concrete or other harsh surfaces.     5.  If you smoke, stop!!!

## 2025-05-08 NOTE — LETTER
5/8/2025      Kaden Cain  [address] [place] [address]      Dear Colleague,    Thank you for referring your patient, Kaden Cain, to the [facility]. Please see a copy of my visit note below.    ASSESSMENT:  Encounter Diagnoses   Name Primary?     Diabetic ulcer of toe of left foot associated with type 2 diabetes mellitus, with fat layer exposed (H) Yes     Polyneuropathy      Type 2 diabetes mellitus with hyperosmolarity without coma, without long-term current use of insulin (H)      MEDICAL DECISION MAKING:  Interval reduction in left great toe edema.  Both wounds show interval healing.  No clinical signs of infection.    I did offer a referral to the [facility] [place] for another opinion.  He is agreeable to this.  The referral was placed.    I reviewed that we might need to resort back to better offloading, yet these demonstrate interval healing since his last visit.    Daily wound cares were reviewed:   Cleansing  Blot dry  Topical antibiotic  Light dressing    Excisional debridement performed -see below    Although things are stable and healing, I reviewed that he is at risk of developing infection every day there is an open wound.    Custom orthoses were prescribed and a referral placed for diabetic shoes and multidensity orthoses    Debridement Procedure:   The purpose and goals of excisional debridement were discussed, including removing nonviable tissue, reducing risk of infection, and promoting healing.  Verbal consent was obtained.    Technique: excisional debridement    Depth of debridement: Excisional debridement was carried down to the depth of, and including, the fat layer.    Location: left great toe     Instrument(s) used: #15 scalpel     Wound Dimensions: Proximal wound: 1.0 cm diameter x 0.2 cm depth to fat layer.  Distal wound: 0.5cm x 0.3cm x 0.2cm deep to fat layer    Type of Wound: neuropathic/ diabetic      Progress Statement:  interval healing; no clinical signs of infection    The procedure was tolerated well by the patient.  No anesthesia needed, due to peripheral neuropathy.  Bacitracin and a light dressing was applied.      Disclaimer: This note consists of symbols derived from keyboarding, dictation and/or voice recognition software. As a result, there may be errors in the script that have gone undetected. Please consider this when interpreting information found in this chart.    Wilmer Prajapati, MARLON, FACFAS, MS    [place] [facility] of Podiatry/Foot & Ankle Surgery      ____________________________________________________________________    HPI:       Kaden Cain follows up for ongoing monitoring and treatment of 2 ulcerations on the left great toe.  He believes the wounds are healing.  He has returned to his regular athletic shoe.    He was hospitalized from 3/26/2025 until 3/29/2025 for treatment of a left diabetic foot infection.  I took him to the operating room on 3/27/2025 for excisional debridement of the left great toe.  Amputation was offered yet MRI was negative for osteomyelitis.  He was treated with IV Zosyn and IV vancomycin.  Discharged on 14 days of p.o. Augmentin.  Followed up in wound care on 3/31/2025 with my partner Dr. Medel.  Wound cares and other detailed instructions provided.       INDICATIONS FOR SURGERY:  Kaden Cain is a pleasant 75 year-old male with a past medical history significant for type 2 diabetes, peripheral neuropathy, CAD with stents x 4, hypertension, hyperlipidemia admitted for a diabetic left foot infection largely involving the great toe.  Clinical exam suggests a significant infection, yet x-ray and MRI do not support underlying osteomyelitis.  Therefore excisional debridement, washout and exploration was recommended.  The procedure was discussed in detail.  Post op recovery scenarios were discussed.  I explained he might need additional surgery including toe amputation.  This will  depend on how the soft tissues demarcate.  No guarantees were given.     *No past medical history on file.*  *  Past Surgical History:   Procedure Laterality Date     CV CORONARY ANGIOGRAM N/A 5/25/2024    Procedure: Coronary Angiogram;  Surgeon: Salomon Carrillo MD;  Location:  HEART CARDIAC CATH LAB     CV INTRAVASULAR ULTRASOUND N/A 5/25/2024    Procedure: Intravascular Ultrasound;  Surgeon: Salomon Carrillo MD;  Location: [facility] CARDIAC CATH LAB     CV PCI N/A 5/25/2024    Procedure: Percutaneous Coronary Intervention;  Surgeon: Salomon Carrillo MD;  Location: [facility] CARDIAC CATH LAB     IRRIGATION AND DEBRIDEMENT TOE, COMBINED Left 3/27/2025    Procedure: excisional debridement, left great toe, involving area greater than 20 cm .   Excisional debridement was carried down to, including, the deep fascia.;  Surgeon: Wilmer Prajapati DPM;  Location: [facility] OR   *  *  Current Outpatient Medications   Medication Sig Dispense Refill     clopidogrel (PLAVIX) 75 MG tablet Take 1 tablet (75 mg) by mouth daily. 90 tablet 3     gabapentin (NEURONTIN) 100 MG capsule Take 1 capsule (100 mg) by mouth 3 times daily. (Patient not taking: Reported on 4/3/2025) 90 capsule 0     glipiZIDE (GLUCOTROL XL) 10 MG 24 hr tablet Take 10 mg by mouth daily (with breakfast).       lisinopril (ZESTRIL) 40 MG tablet Take 1 tablet (40 mg) by mouth daily. 90 tablet 3     metFORMIN (GLUCOPHAGE XR) 500 MG 24 hr tablet Take 1,000 mg by mouth 2 times daily (with meals)       metoprolol tartrate (LOPRESSOR) 25 MG tablet Take 0.5 tablets (12.5 mg) by mouth 2 times daily. 60 tablet 0     nitroGLYcerin (NITROSTAT) 0.4 MG sublingual tablet For chest pain place 1 tablet under the tongue every 5 minutes for 3 doses. If symptoms persist 5 minutes after 1st dose call 911. 15 tablet 0     rosuvastatin (CRESTOR) 20 MG tablet Take 1 tablet (20 mg) by mouth at bedtime 30 tablet 0         EXAM:    Vitals: There were no vitals taken for this visit.  BMI:  There is no height or weight on file to calculate BMI.    Vasc:      Interval reduction of left great toe edema  Pedal pulses are palpable for the dorsalis pedis posterior tibial artery, bilateral foot.  Capillary fill time </= 3 seconds  Pedal skin appears well-perfused  Neuro:      Light touch sensation profoundly diminished to all sensory nerve distributions, bilateral foot.  No apparent spastic contractures or other deformity secondary to neurologic compromise.  Derm:      2 wounds on the left hallux.  Interval lightening of erythema.  The more proximal medial wound measures 1.0 cm in diameter by 0.2 cm depth to the fat layer.  Minimal hyperkeratotic eschar around the margins.   The base is a mix of granular tissue and fibrotic tissue.       The more distal plantar wound measures 0.5 cm in length by 0.3 cm in width by 0.2 cm in depth.  This is considered to be to the depth of the fat layer.  Granular base no clinical signs of infection     MSK:      Bilateral lower extremity muscle strength presents is normal.  No gross deformities  Adequate ankle and subtalar joint range of motion  Calf:    Neg for redness, swelling or tenderness      Again, thank you for allowing me to participate in the care of your patient.        Sincerely,        Wilmer Prajapati DPM    Electronically signed

## 2025-05-08 NOTE — TELEPHONE ENCOUNTER
Consult received via Workqueue from Dr. Prajapati - Podiatry for wound of the foot.    Please schedule as a RETURN PATIENT with Letitia Medel D.P.M. or Wilmer Al D.P.M. at [facility] [place] for next available appointment.    **For all providers, Malu Dugan PA-C, Dr. Medel, Zoë Murphy NP or Dr. Cardenas, please schedule a follow up 2-3 weeks after initial appointment to follow up 4 weeks after initial appt**  --If unable to schedule within 2-3 weeks then please place on cancellation list--    Is the patient able to make their own medical decisions? Yes    Can the patient be scheduled on a Wednesday (Theresa) or Thursday (Jeffrey)? Yes    Is patient a HAN lift? PLEASE INQUIRE WHEN MAKING THE APPOINTMENT AND PUT IN APPOINTMENT NOTES    Routing to  Wound Healing Scheduling.

## 2025-05-08 NOTE — PROGRESS NOTES
ASSESSMENT:  Encounter Diagnoses   Name Primary?    Diabetic ulcer of toe of left foot associated with type 2 diabetes mellitus, with fat layer exposed (H) Yes    Polyneuropathy     Type 2 diabetes mellitus with hyperosmolarity without coma, without long-term current use of insulin (H)      MEDICAL DECISION MAKING:  Interval reduction in left great toe edema.  Both wounds show interval healing.  No clinical signs of infection.    I did offer a referral to the [facility] [place] for another opinion.  He is agreeable to this.  The referral was placed.    I reviewed that we might need to resort back to better offloading, yet these demonstrate interval healing since his last visit.    Daily wound cares were reviewed:   Cleansing  Blot dry  Topical antibiotic  Light dressing    Excisional debridement performed -see below    Although things are stable and healing, I reviewed that he is at risk of developing infection every day there is an open wound.    Custom orthoses were prescribed and a referral placed for diabetic shoes and multidensity orthoses    Debridement Procedure:   The purpose and goals of excisional debridement were discussed, including removing nonviable tissue, reducing risk of infection, and promoting healing.  Verbal consent was obtained.    Technique: excisional debridement    Depth of debridement: Excisional debridement was carried down to the depth of, and including, the fat layer.    Location: left great toe     Instrument(s) used: #15 scalpel     Wound Dimensions: Proximal wound: 1.0 cm diameter x 0.2 cm depth to fat layer.  Distal wound: 0.5cm x 0.3cm x 0.2cm deep to fat layer    Type of Wound: neuropathic/ diabetic      Progress Statement: interval healing; no clinical signs of infection    The procedure was tolerated well by the patient.  No anesthesia needed, due to peripheral neuropathy.  Bacitracin and a light dressing was applied.      Disclaimer: This note consists of symbols  derived from keyboarding, dictation and/or voice recognition software. As a result, there may be errors in the script that have gone undetected. Please consider this when interpreting information found in this chart.    Wilmer Prajapati DPM, FACJOSEPH, MS    [place] Department of Podiatry/Foot & Ankle Surgery      ____________________________________________________________________    HPI:       Kaden Cani follows up for ongoing monitoring and treatment of 2 ulcerations on the left great toe.  He believes the wounds are healing.  He has returned to his regular athletic shoe.    He was hospitalized from 3/26/2025 until 3/29/2025 for treatment of a left diabetic foot infection.  I took him to the operating room on 3/27/2025 for excisional debridement of the left great toe.  Amputation was offered yet MRI was negative for osteomyelitis.  He was treated with IV Zosyn and IV vancomycin.  Discharged on 14 days of p.o. Augmentin.  Followed up in wound care on 3/31/2025 with my partner Dr. Medel.  Wound cares and other detailed instructions provided.       INDICATIONS FOR SURGERY:  Kaden Cain is a pleasant 75 year-old male with a past medical history significant for type 2 diabetes, peripheral neuropathy, CAD with stents x 4, hypertension, hyperlipidemia admitted for a diabetic left foot infection largely involving the great toe.  Clinical exam suggests a significant infection, yet x-ray and MRI do not support underlying osteomyelitis.  Therefore excisional debridement, washout and exploration was recommended.  The procedure was discussed in detail.  Post op recovery scenarios were discussed.  I explained he might need additional surgery including toe amputation.  This will depend on how the soft tissues demarcate.  No guarantees were given.     *No past medical history on file.*  *  Past Surgical History:   Procedure Laterality Date    CV CORONARY ANGIOGRAM N/A 5/25/2024    Procedure: Coronary Angiogram;  Surgeon: Gerardo  Salomon Vickers MD;  Location:  [facility] CARDIAC CATH LAB    CV INTRAVASULAR ULTRASOUND N/A 5/25/2024    Procedure: Intravascular Ultrasound;  Surgeon: Salomon Carrillo MD;  Location: [facility] CARDIAC CATH LAB    CV PCI N/A 5/25/2024    Procedure: Percutaneous Coronary Intervention;  Surgeon: Salomon Carrillo MD;  Location:  HEART CARDIAC CATH LAB    IRRIGATION AND DEBRIDEMENT TOE, COMBINED Left 3/27/2025    Procedure: excisional debridement, left great toe, involving area greater than 20 cm .   Excisional debridement was carried down to, including, the deep fascia.;  Surgeon: Wilmer Prajapati DPM;  Location:  OR   *  *  Current Outpatient Medications   Medication Sig Dispense Refill    clopidogrel (PLAVIX) 75 MG tablet Take 1 tablet (75 mg) by mouth daily. 90 tablet 3    gabapentin (NEURONTIN) 100 MG capsule Take 1 capsule (100 mg) by mouth 3 times daily. (Patient not taking: Reported on 4/3/2025) 90 capsule 0    glipiZIDE (GLUCOTROL XL) 10 MG 24 hr tablet Take 10 mg by mouth daily (with breakfast).      lisinopril (ZESTRIL) 40 MG tablet Take 1 tablet (40 mg) by mouth daily. 90 tablet 3    metFORMIN (GLUCOPHAGE XR) 500 MG 24 hr tablet Take 1,000 mg by mouth 2 times daily (with meals)      metoprolol tartrate (LOPRESSOR) 25 MG tablet Take 0.5 tablets (12.5 mg) by mouth 2 times daily. 60 tablet 0    nitroGLYcerin (NITROSTAT) 0.4 MG sublingual tablet For chest pain place 1 tablet under the tongue every 5 minutes for 3 doses. If symptoms persist 5 minutes after 1st dose call 911. 15 tablet 0    rosuvastatin (CRESTOR) 20 MG tablet Take 1 tablet (20 mg) by mouth at bedtime 30 tablet 0         EXAM:    Vitals: There were no vitals taken for this visit.  BMI: There is no height or weight on file to calculate BMI.    Vasc:      Interval reduction of left great toe edema  Pedal pulses are palpable for the dorsalis pedis posterior tibial artery, bilateral foot.  Capillary fill time </= 3 seconds  Pedal skin appears  well-perfused  Neuro:      Light touch sensation profoundly diminished to all sensory nerve distributions, bilateral foot.  No apparent spastic contractures or other deformity secondary to neurologic compromise.  Derm:      2 wounds on the left hallux.  Interval lightening of erythema.  The more proximal medial wound measures 1.0 cm in diameter by 0.2 cm depth to the fat layer.  Minimal hyperkeratotic eschar around the margins.   The base is a mix of granular tissue and fibrotic tissue.       The more distal plantar wound measures 0.5 cm in length by 0.3 cm in width by 0.2 cm in depth.  This is considered to be to the depth of the fat layer.  Granular base no clinical signs of infection     MSK:      Bilateral lower extremity muscle strength presents is normal.  No gross deformities  Adequate ankle and subtalar joint range of motion  Calf:    Neg for redness, swelling or tenderness

## 2025-05-29 ENCOUNTER — OFFICE VISIT (OUTPATIENT)
Dept: PODIATRY | Facility: CLINIC | Age: 75
End: 2025-05-29
Payer: MEDICARE

## 2025-05-29 DIAGNOSIS — E11.621 DIABETIC ULCER OF TOE OF LEFT FOOT ASSOCIATED WITH TYPE 2 DIABETES MELLITUS, WITH FAT LAYER EXPOSED (H): Primary | ICD-10-CM

## 2025-05-29 DIAGNOSIS — E11.00 TYPE 2 DIABETES MELLITUS WITH HYPEROSMOLARITY WITHOUT COMA, WITHOUT LONG-TERM CURRENT USE OF INSULIN (H): ICD-10-CM

## 2025-05-29 DIAGNOSIS — L97.522 DIABETIC ULCER OF TOE OF LEFT FOOT ASSOCIATED WITH TYPE 2 DIABETES MELLITUS, WITH FAT LAYER EXPOSED (H): Primary | ICD-10-CM

## 2025-05-29 DIAGNOSIS — G62.9 POLYNEUROPATHY: ICD-10-CM

## 2025-05-29 NOTE — PROGRESS NOTES
ASSESSMENT:  Encounter Diagnoses   Name Primary?    Diabetic ulcer of toe of left foot associated with type 2 diabetes mellitus, with fat layer exposed (H) Yes    Polyneuropathy     Type 2 diabetes mellitus with hyperosmolarity without coma, without long-term current use of insulin (H)      MEDICAL DECISION MAKING:  The medial wound appears to be healing.  The plantar wound is stable.  With his hallux limitus, I explained it might be very difficult to heal the plantar wound.  Continue daily wound cares  Excisional debridement was performed -see below  Follow-up with Dr. Al at the [facility] wound [facility] [place] tomorrow as scheduled    No new complaints today.  No new findings on clinical exam.      Debridement Procedure:   The purpose and goals of excisional debridement were discussed, including removing nonviable tissue, reducing risk of infection, and promoting healing.  Verbal consent was obtained.    Technique: excisional debridement    Depth of debridement: Excisional debridement was carried down to the depth of, and including, the fat layer.    Location: left great toe    Instrument(s) used: #15 scalpel and tissue nippers    Wound Dimensions: The more proximal medial wound measures 0.7 cm in diameter by 0.1 cm depth to the fat layer.    Type of Wound: neuropathic     Progress Statement: healing. No clinical signs of infection    The procedure was tolerated well by the patient.  No anesthesia needed, due to peripheral neuropathy.  Bacitracin and a light dressing was applied.      Debridement Procedure:   The purpose and goals of excisional debridement were discussed, including removing nonviable tissue, reducing risk of infection, and promoting healing.  Verbal consent was obtained.    Technique: excisional debridement    Depth of debridement: Excisional debridement was carried down to the depth of, and including, the fat layer.    Location:  left great toe    Instrument(s) used: #15 scalpel and  tissue nippers    Wound Dimensions:    The more distal plantar wound measures 1.2 cm in length by 0.3 cm in width by 0.2 cm in depth.  This is considered to be to the depth of the fat layer.  Type of Wound: neuropathic      Progress Statement: some healing. No clinical signs of infection    The procedure was tolerated well by the patient.  No anesthesia needed, due to peripheral neuropathy.  Bacitracin and a light dressing was applied.            Disclaimer: This note consists of symbols derived from keyboarding, dictation and/or voice recognition software. As a result, there may be errors in the script that have gone undetected. Please consider this when interpreting information found in this chart.    Wilmer Prajapati, MARLON, FACFAS, MS    [place] Department of Podiatry/Foot & Ankle Surgery      ____________________________________________________________________    HPI:       Kaden Cain follows up for ongoing monitoring and treatment of 2 ulcerations on the left great toe.  He believes the wounds continue to heal  Daily cleansing, wound gel, dressing.     He was hospitalized from 3/26/2025 until 3/29/2025 for treatment of a left diabetic foot infection.  I took him to the operating room on 3/27/2025 for excisional debridement of the left great toe.  Amputation was offered yet MRI was negative for osteomyelitis.  He was treated with IV Zosyn and IV vancomycin.  Discharged on 14 days of p.o. Augmentin.  Followed up in wound care on 3/31/2025 with my partner Dr. Medel.  Wound cares and other detailed instructions provided.       INDICATIONS FOR SURGERY:  Kaden Cain is a pleasant 75 year-old male with a past medical history significant for type 2 diabetes, peripheral neuropathy, CAD with stents x 4, hypertension, hyperlipidemia admitted for a diabetic left foot infection largely involving the great toe.  Clinical exam suggests a significant infection, yet x-ray and MRI do not support underlying osteomyelitis.   Therefore excisional debridement, washout and exploration was recommended.  The procedure was discussed in detail.  Post op recovery scenarios were discussed.  I explained he might need additional surgery including toe amputation.  This will depend on how the soft tissues demarcate.  No guarantees were given.   *No past medical history on file.*  *  Past Surgical History:   Procedure Laterality Date    CV CORONARY ANGIOGRAM N/A 5/25/2024    Procedure: Coronary Angiogram;  Surgeon: Salomon Carrillo MD;  Location: [facility] CARDIAC CATH LAB    CV INTRAVASULAR ULTRASOUND N/A 5/25/2024    Procedure: Intravascular Ultrasound;  Surgeon: Salomon Carrillo MD;  Location: [facility] CARDIAC CATH LAB    CV PCI N/A 5/25/2024    Procedure: Percutaneous Coronary Intervention;  Surgeon: Salomon Carrillo MD;  Location: [facility] CARDIAC CATH LAB    IRRIGATION AND DEBRIDEMENT TOE, COMBINED Left 3/27/2025    Procedure: excisional debridement, left great toe, involving area greater than 20 cm .   Excisional debridement was carried down to, including, the deep fascia.;  Surgeon: Wilmer Prajapati DPM;  Location: [facility] OR   *  *  Current Outpatient Medications   Medication Sig Dispense Refill    clopidogrel (PLAVIX) 75 MG tablet Take 1 tablet (75 mg) by mouth daily. 90 tablet 3    gabapentin (NEURONTIN) 100 MG capsule Take 1 capsule (100 mg) by mouth 3 times daily. (Patient not taking: Reported on 5/29/2025) 90 capsule 0    glipiZIDE (GLUCOTROL XL) 10 MG 24 hr tablet Take 10 mg by mouth daily (with breakfast).      lisinopril (ZESTRIL) 40 MG tablet Take 1 tablet (40 mg) by mouth daily. 90 tablet 3    metFORMIN (GLUCOPHAGE XR) 500 MG 24 hr tablet Take 1,000 mg by mouth 2 times daily (with meals)      metoprolol tartrate (LOPRESSOR) 25 MG tablet Take 0.5 tablets (12.5 mg) by mouth 2 times daily. 60 tablet 0    nitroGLYcerin (NITROSTAT) 0.4 MG sublingual tablet For chest pain place 1 tablet under the tongue every 5 minutes for 3 doses. If  symptoms persist 5 minutes after 1st dose call 911. 15 tablet 0    rosuvastatin (CRESTOR) 20 MG tablet Take 1 tablet (20 mg) by mouth at bedtime 30 tablet 0         EXAM:    Vitals: There were no vitals taken for this visit.  BMI: There is no height or weight on file to calculate BMI.    Vasc:      Interval reduction of left great toe edema  Pedal pulses are palpable for the dorsalis pedis posterior tibial artery, bilateral foot.  Capillary fill time </= 3 seconds  Pedal skin appears well-perfused  Neuro:      Light touch sensation profoundly diminished to all sensory nerve distributions, bilateral foot.  No apparent spastic contractures or other deformity secondary to neurologic compromise.  Derm:      2 wounds on the left hallux.    The more proximal medial wound measures 0.7 cm in diameter by 0.1 cm depth to the fat layer.  Minimal hyperkeratotic eschar around the margins.   The base is granular tissue.     The more distal plantar wound measures 1.2 cm in length by 0.3 cm in width by 0.2 cm in depth.  This is considered to be to the depth of the fat layer.  Granular base no clinical signs of infection     MSK:      Bilateral lower extremity muscle strength presents is normal.  No gross deformities  Adequate ankle and subtalar joint range of motion  Calf:    Neg for redness, swelling or tenderness

## 2025-05-29 NOTE — LETTER
5/29/2025      Kaden Millarduer  [address] [place] [address]      Dear Colleague,    Thank you for referring your patient, Kaden Cain, to the [facility]. Please see a copy of my visit note below.    ASSESSMENT:  Encounter Diagnoses   Name Primary?     Diabetic ulcer of toe of left foot associated with type 2 diabetes mellitus, with fat layer exposed (H) Yes     Polyneuropathy      Type 2 diabetes mellitus with hyperosmolarity without coma, without long-term current use of insulin (H)      MEDICAL DECISION MAKING:  The medial wound appears to be healing.  The plantar wound is stable.  With his hallux limitus, I explained it might be very difficult to heal the plantar wound.  Continue daily wound cares  Excisional debridement was performed -see below  Follow-up with Dr. Al at the [facility] wound [facility] [place] tomorrow as scheduled    No new complaints today.  No new findings on clinical exam.      Debridement Procedure:   The purpose and goals of excisional debridement were discussed, including removing nonviable tissue, reducing risk of infection, and promoting healing.  Verbal consent was obtained.    Technique: excisional debridement    Depth of debridement: Excisional debridement was carried down to the depth of, and including, the fat layer.    Location: left great toe    Instrument(s) used: #15 scalpel and tissue nippers    Wound Dimensions: The more proximal medial wound measures 0.7 cm in diameter by 0.1 cm depth to the fat layer.    Type of Wound: neuropathic     Progress Statement: healing. No clinical signs of infection    The procedure was tolerated well by the patient.  No anesthesia needed, due to peripheral neuropathy.  Bacitracin and a light dressing was applied.      Debridement Procedure:   The purpose and goals of excisional debridement were discussed, including removing nonviable tissue, reducing risk of infection, and promoting  healing.  Verbal consent was obtained.    Technique: excisional debridement    Depth of debridement: Excisional debridement was carried down to the depth of, and including, the fat layer.    Location:  left great toe    Instrument(s) used: #15 scalpel and tissue nippers    Wound Dimensions:    The more distal plantar wound measures 1.2 cm in length by 0.3 cm in width by 0.2 cm in depth.  This is considered to be to the depth of the fat layer.  Type of Wound: neuropathic      Progress Statement: some healing. No clinical signs of infection    The procedure was tolerated well by the patient.  No anesthesia needed, due to peripheral neuropathy.  Bacitracin and a light dressing was applied.            Disclaimer: This note consists of symbols derived from keyboarding, dictation and/or voice recognition software. As a result, there may be errors in the script that have gone undetected. Please consider this when interpreting information found in this chart.    Wilmer Prajapati DPM, FACFAS, MS    [place] [facility] Pod[facility]/Foot & Ankle Surgery      ____________________________________________________________________    HPI:       Kaden Cain follows up for ongoing monitoring and treatment of 2 ulcerations on the left great toe.  He believes the wounds continue to heal  Daily cleansing, wound gel, dressing.     He was hospitalized from 3/26/2025 until 3/29/2025 for treatment of a left diabetic foot infection.  I took him to the operating room on 3/27/2025 for excisional debridement of the left great toe.  Amputation was offered yet MRI was negative for osteomyelitis.  He was treated with IV Zosyn and IV vancomycin.  Discharged on 14 days of p.o. Augmentin.  Followed up in wound care on 3/31/2025 with my partner Dr. Medel.  Wound cares and other detailed instructions provided.       INDICATIONS FOR SURGERY:  Kaden Cain is a pleasant 75 year-old male with a past medical history significant for type 2 diabetes,  peripheral neuropathy, CAD with stents x 4, hypertension, hyperlipidemia admitted for a diabetic left foot infection largely involving the great toe.  Clinical exam suggests a significant infection, yet x-ray and MRI do not support underlying osteomyelitis.  Therefore excisional debridement, washout and exploration was recommended.  The procedure was discussed in detail.  Post op recovery scenarios were discussed.  I explained he might need additional surgery including toe amputation.  This will depend on how the soft tissues demarcate.  No guarantees were given.   *No past medical history on file.*  *  Past Surgical History:   Procedure Laterality Date     CV CORONARY ANGIOGRAM N/A 5/25/2024    Procedure: Coronary Angiogram;  Surgeon: Salomon Carrillo MD;  Location:  [facility]     CV INTRAVASULAR ULTRASOUND N/A 5/25/2024    Procedure: Intravascular Ultrasound;  Surgeon: Salomon Carrillo MD;  Location: [facility] CARDIAC CATH LAB     CV PCI N/A 5/25/2024    Procedure: Percutaneous Coronary Intervention;  Surgeon: Salomon Carrillo MD;  Location: [facility] CARDIAC CATH LAB     IRRIGATION AND DEBRIDEMENT TOE, COMBINED Left 3/27/2025    Procedure: excisional debridement, left great toe, involving area greater than 20 cm .   Excisional debridement was carried down to, including, the deep fascia.;  Surgeon: Wilmer Prajapati DPM;  Location:  OR   *  *  Current Outpatient Medications   Medication Sig Dispense Refill     clopidogrel (PLAVIX) 75 MG tablet Take 1 tablet (75 mg) by mouth daily. 90 tablet 3     gabapentin (NEURONTIN) 100 MG capsule Take 1 capsule (100 mg) by mouth 3 times daily. (Patient not taking: Reported on 5/29/2025) 90 capsule 0     glipiZIDE (GLUCOTROL XL) 10 MG 24 hr tablet Take 10 mg by mouth daily (with breakfast).       lisinopril (ZESTRIL) 40 MG tablet Take 1 tablet (40 mg) by mouth daily. 90 tablet 3     metFORMIN (GLUCOPHAGE XR) 500 MG 24 hr tablet Take 1,000 mg by mouth 2 times  daily (with meals)       metoprolol tartrate (LOPRESSOR) 25 MG tablet Take 0.5 tablets (12.5 mg) by mouth 2 times daily. 60 tablet 0     nitroGLYcerin (NITROSTAT) 0.4 MG sublingual tablet For chest pain place 1 tablet under the tongue every 5 minutes for 3 doses. If symptoms persist 5 minutes after 1st dose call 911. 15 tablet 0     rosuvastatin (CRESTOR) 20 MG tablet Take 1 tablet (20 mg) by mouth at bedtime 30 tablet 0         EXAM:    Vitals: There were no vitals taken for this visit.  BMI: There is no height or weight on file to calculate BMI.    Vasc:      Interval reduction of left great toe edema  Pedal pulses are palpable for the dorsalis pedis posterior tibial artery, bilateral foot.  Capillary fill time </= 3 seconds  Pedal skin appears well-perfused  Neuro:      Light touch sensation profoundly diminished to all sensory nerve distributions, bilateral foot.  No apparent spastic contractures or other deformity secondary to neurologic compromise.  Derm:      2 wounds on the left hallux.    The more proximal medial wound measures 0.7 cm in diameter by 0.1 cm depth to the fat layer.  Minimal hyperkeratotic eschar around the margins.   The base is granular tissue.     The more distal plantar wound measures 1.2 cm in length by 0.3 cm in width by 0.2 cm in depth.  This is considered to be to the depth of the fat layer.  Granular base no clinical signs of infection     MSK:      Bilateral lower extremity muscle strength presents is normal.  No gross deformities  Adequate ankle and subtalar joint range of motion  Calf:    Neg for redness, swelling or tenderness        Again, thank you for allowing me to participate in the care of your patient.        Sincerely,        Wilmer Prajapati DPM    Electronically signed

## 2025-06-29 ENCOUNTER — HEALTH MAINTENANCE LETTER (OUTPATIENT)
Age: 75
End: 2025-06-29

## 2025-07-11 ENCOUNTER — HOSPITAL ENCOUNTER (OUTPATIENT)
Dept: WOUND CARE | Facility: CLINIC | Age: 75
Discharge: HOME | End: 2025-07-11
Attending: ASSISTANT, PODIATRIC | Admitting: ASSISTANT, PODIATRIC
Payer: MEDICARE

## 2025-07-11 VITALS — HEART RATE: 60 BPM | SYSTOLIC BLOOD PRESSURE: 126 MMHG | TEMPERATURE: 96.8 F | DIASTOLIC BLOOD PRESSURE: 78 MMHG

## 2025-07-11 DIAGNOSIS — L97.522 SKIN ULCER OF LEFT GREAT TOE WITH FAT LAYER EXPOSED (H): Primary | ICD-10-CM

## 2025-07-11 DIAGNOSIS — L84 CORNS AND CALLOSITIES: ICD-10-CM

## 2025-07-11 DIAGNOSIS — E11.42 TYPE 2 DIABETES MELLITUS WITH PERIPHERAL NEUROPATHY (H): ICD-10-CM

## 2025-07-11 PROCEDURE — 11055 PARING/CUTG B9 HYPRKER LES 1: CPT | Performed by: ASSISTANT, PODIATRIC

## 2025-07-11 PROCEDURE — 99213 OFFICE O/P EST LOW 20 MIN: CPT | Mod: 25 | Performed by: ASSISTANT, PODIATRIC

## 2025-07-11 NOTE — DISCHARGE INSTRUCTIONS
07/11/2025   Kaden Cain   1950      Plan 07/11/2025     A DME order was not completed because supplies were not needed  Dressing changes outside of clinic are being performed by Spouse     Plan 07/11/2025   -Wear in the new shoes slowly. Wear your old ones most of the time and slowly increase the time in your new shoes.   Purchase on [employer] Post op surgical shoe size XL  DONE: Follow up with Orthotic shoe next Friday  Continue to control Blood sugars with High Protein Diet  Any signs of infection call clinic or go to ER  Purchase vashe online on [employer] [employer] or [address]  Follow with Dr. Prajapati for wound treatment if Dr Al is not available    Completed antibiotic therapy     Wound Dressing Change: Left Plantar toe 1   - Wash your hands with soap and water and prepare a clean surface for dressings  Shower with wound covered with bag  Remove dressing after showering:   Apply a Vashe moist gauze pad to  wound for 5 min; remove and pat dry  Apply Dab of Iodosorb gel to band aid to cover each wound  Wear Compression hose  Wear [employer] Post op surgical shoe size XL  Change dressing Daily and as needed for soilage/leakage  Monitor the left medial toe 1      You do not need to change the dressing on the days you are being seen at the wound clinic        Main Provider: Wilmer Al D.P.M. July 11, 2025    Call us at [phone] if you have any questions about your wounds, if you have redness or swelling around your wound, have a fever of 101 degrees Fahrenheit or greater or if you have any other problems or concerns. We answer the phone Monday through Friday 8 am to 4 pm, please leave a message as we check the voicemail frequently throughout the day. If you have a concern over the weekend, please leave a message and we will return your call Monday. If the need is urgent, go to the ER or urgent care.    If you had a positive experience please indicate that on your patient satisfaction survey form  that [facility] will be sending you.    It was a pleasure meeting with you today.  Thank you for allowing our team the privilege of caring for you today.  YOU are the reason we are here, and we truly hope we provided you with the excellent service you deserve.  Please let us know if there is anything else we can do for you so that we can be sure you are leaving completely satisfied with your care experience.      If you have any billing related questions please call the [facility] Business office at [phone]. The clinic staff does not handle billing related matters.    If you are scheduled to have a follow up appointment, you will receive a reminder call the day before your visit. On the appointment day please arrive 15 minutes prior to your appointment time. If you are unable to keep that appointment, please call the clinic to cancel or reschedule. If you are more than 10 minutes late or greater for your scheduled appointment time, the clinic policy is that you may be asked to reschedule.

## 2025-07-14 NOTE — PROGRESS NOTES
[facility] [place] Progress Note    Subject/HPI:   Patient is a very pleasant 75-year-old type II diabetic male seen today for evaluation of left great toe ulcer.  He was referred to the wound care clinic by Dr. Wilmer Prajapati for evaluation and help with management of his wounds.  Patient notes has been dealing with these wounds for a while, has been completing local wound care when he can, did not come in with a dressing today.  Denies any pain from the wounds, denies any increased redness, swelling, drainage from the wounds.  Denies any specific pain no recent trauma to the area, states that his sugars are within ranges asked of him, does state he has numbness and tingling/neuropathy in his feet.    Interval: Since last visit he is doing well, states he did wear the shoe for a little bit but has not been adherent to wearing the postoperative shoe in the last few weeks.  Notes significant callus buildup but there is no further drainage, he believes area looks better without any further erythema or swelling.  No other concerns at this time    PMH:   - Type 2 diabetes with neuropathy  - Diabetic ulcer left foot    Social Hx:   Social History     Socioeconomic History    Marital status:      Spouse name: Not on file    Number of children: Not on file    Years of education: Not on file    Highest education level: Not on file   Occupational History    Not on file   Tobacco Use    Smoking status: Never    Smokeless tobacco: Never   Substance and Sexual Activity    Alcohol use: Not Currently     Comment: not since 1976    Drug use: Not on file    Sexual activity: Not on file   Other Topics Concern    Not on file   Social History Narrative    Not on file     Social Drivers of Health     Financial Resource Strain: Low Risk  (3/26/2025)    Financial Resource Strain     Within the past 12 months, have you or your family members you live with been unable to get utilities (heat, electricity) when it was  really needed?: No   Food Insecurity: Low Risk  (3/26/2025)    Food Insecurity     Within the past 12 months, did you worry that your food would run out before you got money to buy more?: No     Within the past 12 months, did the food you bought just not last and you didn t have money to get more?: No   Transportation Needs: Low Risk  (3/26/2025)    Transportation Needs     Within the past 12 months, has lack of transportation kept you from medical appointments, getting your medicines, non-medical meetings or appointments, work, or from getting things that you need?: No   Physical Activity: Not on file   Stress: Not on file   Social Connections: Unknown (12/30/2021)    Received from [facility]    Social Connections     Frequency of Communication with Friends and Family: Not on file   Interpersonal Safety: Low Risk  (7/11/2025)    Interpersonal Safety     Do you feel physically and emotionally safe where you currently live?: Yes     Within the past 12 months, have you been hit, slapped, kicked or otherwise physically hurt by someone?: No     Within the past 12 months, have you been humiliated or emotionally abused in other ways by your partner or ex-partner?: No   Housing Stability: Low Risk  (3/26/2025)    Housing Stability     Do you have housing? : Yes     Are you worried about losing your housing?: No       Surgical Hx:   Past Surgical History:   Procedure Laterality Date    CV CORONARY ANGIOGRAM N/A 5/25/2024    Procedure: Coronary Angiogram;  Surgeon: Salomon Carrillo MD;  Location: [facility] CARDIAC CATH LAB    CV INTRAVASULAR ULTRASOUND N/A 5/25/2024    Procedure: Intravascular Ultrasound;  Surgeon: Salomon Carrillo MD;  Location: [facility] CARDIAC CATH LAB    CV PCI N/A 5/25/2024    Procedure: Percutaneous Coronary Intervention;  Surgeon: Salomon Carrillo MD;  Location: [facility] CARDIAC CATH LAB    IRRIGATION AND DEBRIDEMENT TOE, COMBINED Left 3/27/2025    Procedure:  excisional debridement, left great toe, involving area greater than 20 cm .   Excisional debridement was carried down to, including, the deep fascia.;  Surgeon: Wilmer Prajapati DPM;  Location: [facility] OR       Allergies:  No Known Allergies    Medications:   Current Outpatient Medications   Medication Sig Dispense Refill    clopidogrel (PLAVIX) 75 MG tablet Take 1 tablet (75 mg) by mouth daily. 90 tablet 3    gabapentin (NEURONTIN) 100 MG capsule Take 1 capsule (100 mg) by mouth 3 times daily. (Patient not taking: Reported on 5/29/2025) 90 capsule 0    glipiZIDE (GLUCOTROL XL) 10 MG 24 hr tablet Take 10 mg by mouth daily (with breakfast).      lisinopril (ZESTRIL) 40 MG tablet Take 1 tablet (40 mg) by mouth daily. 90 tablet 3    metFORMIN (GLUCOPHAGE XR) 500 MG 24 hr tablet Take 1,000 mg by mouth 2 times daily (with meals)      metoprolol tartrate (LOPRESSOR) 25 MG tablet Take 0.5 tablets (12.5 mg) by mouth 2 times daily. 60 tablet 0    nitroGLYcerin (NITROSTAT) 0.4 MG sublingual tablet For chest pain place 1 tablet under the tongue every 5 minutes for 3 doses. If symptoms persist 5 minutes after 1st dose call 911. 15 tablet 0    rosuvastatin (CRESTOR) 20 MG tablet Take 1 tablet (20 mg) by mouth at bedtime 30 tablet 0     No current facility-administered medications for this encounter.         Objective:  Vitals:  /78 (BP Location: Left arm, Patient Position: Sitting)   Pulse 60   Temp 96.8  F (36  C) (Temporal)     A1C:   Hemoglobin A1C   Date Value Ref Range Status   03/26/2025 8.4 (H) <5.7 % Final     Comment:     Normal <5.7%   Prediabetes 5.7-6.4%    Diabetes 6.5% or higher     Note: Adopted from ADA consensus guidelines.         General:  Patient is alert and orientated.  NAD     Dermatologic:   Skin overall is smooth and supple with good turgor with some areas of dryness and shiny atrophic skin with hyperpigmentation's noted bilateral lower extremity.      He still has a full-thickness ulceration to  the plantar aspect of the great toe after debridement of callus area, this ulcer bleeds easily and is 100% granular without bone exposed.  The previous more dorsal wound is healed at this time.  Significant hyperkeratotic buildup over the left plantar medial toe wound  .   Wound (used by OP [facility] only) 03/31/25 1205 1 toe left;medial (Active)   Thickness/Stage full thickness 07/11/25 1302   Base pink 07/11/25 1302   Periwound redness 07/11/25 1302   Periwound Temperature warm 07/11/25 1302   Periwound Skin Turgor soft 07/11/25 1302   Edges rolled/closed 07/11/25 1302   Length (cm) 0.6 05/30/25 1416   Width (cm) 0.6 05/30/25 1416   Depth (cm) 0.2 05/30/25 1416   Wound (cm^2) 0.36 cm^2 05/30/25 1416   Wound Volume (cm^3) 0.07 cm^3 05/30/25 1416   Wound healing % 59.09 05/30/25 1416   Undermining [Depth (cm)/Location] 11-1 o'clock / 0.4cm 03/31/25 1200   Drainage Characteristics/Odor serosanguineous 05/30/25 1416   Drainage Amount none 07/11/25 1302   Care, Wound debrided;chemical cautery applied 05/30/25 1416       Wound (used by [facility] only) 03/31/25 1217 1 toe left;plantar (Active)   Thickness/Stage full thickness 07/11/25 1302   Base granulating 07/11/25 1302   Periwound redness;swelling 07/11/25 1302   Periwound Temperature warm 07/11/25 1302   Periwound Skin Turgor soft 07/11/25 1302   Edges open 07/11/25 1302   Length (cm) 0.6 07/11/25 1302   Width (cm) 0.2 07/11/25 1302   Depth (cm) 0.4 07/11/25 1302   Wound (cm^2) 0.12 cm^2 07/11/25 1302   Wound Volume (cm^3) 0.05 cm^3 07/11/25 1302   Wound healing % 88.89 07/11/25 1302   Drainage Characteristics/Odor serosanguineous 07/11/25 1302   Drainage Amount moderate 07/11/25 1302   Care, Wound debrided 07/11/25 1302          Vascular: DP & PT pulses are intact & regular bilaterally.  No significant edema or varicosities noted.  CFT and skin temperature is normal to both lower extremities.     Neurologic: Light touch sensation is profoundly diminished in all sensory nerve  distributions of bilateral foot consistent with peripheral neuropathy, no spastic contractures or other deformities consistent with neurologic compromise     Musculoskeletal: Patient is ambulatory without assistive device or brace.  No gross ankle deformity noted.  No foot or ankle joint effusion is noted.    Imaging:    Narrative & Impression   EXAM: MR FOOT LEFT W/O CONTRAST  LOCATION: [facility]  DATE: 3/26/2025     INDICATION: Left foot infection, evaluate for abscess or osteomyelitis.  COMPARISON: 3/26/2025 radiographs.  TECHNIQUE: Unenhanced.     FINDINGS:      JOINTS AND BONES:   -No fracture or osteomyelitis. Advanced degenerative changes at the first MTP joint with mild hallux valgus. Mild degenerative changes at the IP joint of the great toe. Mild degenerative changes at the third and fifth TMT joints. No joint effusion.   Normal tibial and fibular sesamoids.     TENDONS:   -No tendon tear, tendinopathy, or tenosynovitis.      LIGAMENTS:   -Lisfranc ligament: Intact. No subluxation.     MUSCLES AND SOFT TISSUES:   -Marked global atrophy of the intrinsic musculature of the foot. Small soft tissue ulcer along the plantar/medial aspect of the great toe just distal to the IP joint. There is a moderate-sized, confluent fluid collection along the medial aspect of the   proximal phalanx of the great toe worrisome for an abscess as seen series 8001 image 18 and series 03297 image 16. There is moderate adjacent poorly defined cellulitis in the great toe as well as throughout the dorsum of the foot.                                                                      IMPRESSION:  1.  No definite evidence of osteomyelitis.     2.  Soft tissue ulcer along the plantar/medial aspect of the great toe just distal to the IP joint with a possible abscess along the medial margin of the proximal phalanx.     3.  Advanced degenerative changes at the first MTP joint.     4.  Marked global atrophy of the  intrinsic musculature of the foot.           Assessment:   # Type 2 diabetes with peripheral neuropathy  # Diabetic foot ulcer left great toe  # Corns and callosities    I again stressed the importance of offloading to patient.  He does still have a wound area on the plantar surface likely due to pressure.  Although the area is stable with no signs or symptoms of infection anytime there is an open wound and a type II diabetic with neuropathy there is always a risk that this could get worse, I did relay this to the patient today.    Given how clean the wound area is I am okay with a simple dressing like an Iodosorb change daily, reminded him to not get the area wet, offload is much as possible    Plan:    - Patient was seen and evaluated today, findings and plan discussed with patient as well as his wife  - Recommend offloading with Darco postop shoe, he states that an order was placed for this and he is getting fitted, otherwise I gave him instructions to buy one on [employer]  - Recommend Iodosorb and bandage to both wounds changed daily  - Monitor for signs or symptoms of infection including redness, swelling, drainage or fever, chills, nausea, vomiting, then present to emergency department for evaluation if this occurs  - I would avoid getting the wound areas wet, cover with a bag and can sponge bath in between  - Can wash wound areas with Vashe or lukewarm water and soap    I will see patient again in about a month    Procedure:  After verbal consent, per protocol lidocaine was applied to the wound x2 left great toe.  I trimmed hyperkeratotic tissue around the wound area to reveal a granular wound.  No specific debridement was completed over the wound today, evaluation revealed good bleeding and no probe to bone.  Dressing was applied        Wilmer Al DPM                        Further instructions from your care team         07/11/2025   Kaden Cain   1950      Plan 07/11/2025     A DME order was not  completed because supplies were not needed  Dressing changes outside of clinic are being performed by Spouse     Plan 07/11/2025   -Wear in the new shoes slowly. Wear your old ones most of the time and slowly increase the time in your new shoes.   Purchase on [employer] Post op surgical shoe size XL  DONE: Follow up with Orthotic shoe next Friday  Continue to control Blood sugars with High Protein Diet  Any signs of infection call clinic or go to ER  Purchase vashe online on [employer] or [employer] [address]  Follow with Dr. Prajapati for wound treatment if Dr Al is not available    Completed antibiotic therapy     Wound Dressing Change: Left Plantar toe 1   - Wash your hands with soap and water and prepare a clean surface for dressings  Shower with wound covered with bag  Remove dressing after showering:   Apply a Vashe moist gauze pad to  wound for 5 min; remove and pat dry  Apply Dab of Iodosorb gel to band aid to cover each wound  Wear Compression hose  Wear Darco Men Post op surgical shoe size XL  Change dressing Daily and as needed for soilage/leakage  Monitor the left medial toe 1      You do not need to change the dressing on the days you are being seen at the wound clinic        Main Provider: Wilmer Al D.P.M. July 11, 2025    Call us at [phone] if you have any questions about your wounds, if you have redness or swelling around your wound, have a fever of 101 degrees Fahrenheit or greater or if you have any other problems or concerns. We answer the phone Monday through Friday 8 am to 4 pm, please leave a message as we check the voicemail frequently throughout the day. If you have a concern over the weekend, please leave a message and we will return your call Monday. If the need is urgent, go to the ER or urgent care.    If you had a positive experience please indicate that on your patient satisfaction survey form that [employer] will be sending you.    It was a pleasure meeting with you  today.  Thank you for allowing our team the privilege of caring for you today.  YOU are the reason we are here, and we truly hope we provided you with the excellent service you deserve.  Please let us know if there is anything else we can do for you so that we can be sure you are leaving completely satisfied with your care experience.      If you have any billing related questions please call the [facility] Business office at [phone]. The clinic staff does not handle billing related matters.    If you are scheduled to have a follow up appointment, you will receive a reminder call the day before your visit. On the appointment day please arrive 15 minutes prior to your appointment time. If you are unable to keep that appointment, please call the clinic to cancel or reschedule. If you are more than 10 minutes late or greater for your scheduled appointment time, the clinic policy is that you may be asked to reschedule.

## (undated) DEVICE — TOURNIQUET SGL BLADDER 18"X4" RED 5921-218-135

## (undated) DEVICE — CATH DIAGNOSTIC RADIAL 5FR TIG 4.0

## (undated) DEVICE — BNDG KLING 3" 2232

## (undated) DEVICE — CATH BALLOON NC EMERGE 3.00X20MM H7493926720300

## (undated) DEVICE — GUIDEWIRE RUNTHROUGH IZANAI PTCA .014 X 180CM 25-5211

## (undated) DEVICE — SU PROLENE 3-0 PS-2 18" 8687H

## (undated) DEVICE — KIT HAND CONTROL ANGIOTOUCH ACIST 65CM AT-P65

## (undated) DEVICE — ESU GROUND PAD ADULT W/CORD E7507

## (undated) DEVICE — CATH BALLOON NC EMERGE 4.00X8MM H7493926708400

## (undated) DEVICE — DRSG GAUZE 4X4" TRAY

## (undated) DEVICE — GLOVE BIOGEL PI ULTRATOUCH SZ 8.0 41180

## (undated) DEVICE — SLEEVE TR BAND RADIAL COMPRESSION DEVICE 24CM TRB24-REG

## (undated) DEVICE — BAG CLEAR TRASH 1.3M 39X33" P4040C

## (undated) DEVICE — SYR 20ML LL W/O NDL 302830

## (undated) DEVICE — PREP POVIDONE IODINE SOLUTION 10% 4OZ BOTTLE 29906-004

## (undated) DEVICE — CATH US OD 5FR OD SEC 2.9FR EAGLE EYE PLATINUM 0.014 85900P

## (undated) DEVICE — TOTE ANGIO CORP PC15AT SAN32CC83O

## (undated) DEVICE — NDL ANGIOCATH 18GA 1.25" 4055

## (undated) DEVICE — BNDG KLING 4" 2236

## (undated) DEVICE — NDL SPINAL 18GA 3.5" 405184

## (undated) DEVICE — PREP POVIDONE-IODINE 7.5% SCRUB 4OZ BOTTLE MDS093945

## (undated) DEVICE — LINEN HALF SHEET 5512

## (undated) DEVICE — CAST PADDING 4" STERILE 9044S

## (undated) DEVICE — CATH BALLOON NC EMERGE 3.25X12MM H7493926712320

## (undated) DEVICE — LINEN ORTHO ACL PACK 5447

## (undated) DEVICE — RAD G/W INQWIRE .035X260CM J-TIP EXCHANGE IQ35F260J1O5RS

## (undated) DEVICE — SYR ANGIOGRAPHY MULTIUSE KIT ACIST 014612

## (undated) DEVICE — CATH ANGIO JUDKINS R4 6FRX100CM INFINITI 534621T

## (undated) DEVICE — DEFIB PRO-PADZ LVP LQD GEL ADULT 8900-2105-01

## (undated) DEVICE — INTRO GLIDESHEATH SLENDER 6FR 10X45CM 60-1060

## (undated) DEVICE — DRAPE POUCH IRR 1016

## (undated) DEVICE — PACKING IODOFORM STRIP 1/4" 7831

## (undated) DEVICE — CATH BALLOON NC EMERGE 2.50X20MM H7493926720250

## (undated) DEVICE — INFL DVC BASIXCOMPAK PLYCRB 30 ATM 13IN 20ML IN4530

## (undated) DEVICE — DRSG ABDOMINAL 07 1/2X8" 7197D

## (undated) DEVICE — PACK LOWER EXTREMITY RIDGES

## (undated) DEVICE — VALVE HEMOSTASIS .096" COPILOT MECH 1003331

## (undated) DEVICE — CATH BALLOON NC EMERGE 2.50X8MM H7493926708250

## (undated) DEVICE — MANIFOLD KIT ANGIO AUTOMATED 014613

## (undated) DEVICE — TRAY PREP DRY SKIN SCRUB 067

## (undated) DEVICE — GLOVE BIOGEL PI MICRO SZ 8.0 48580

## (undated) DEVICE — LINEN FULL SHEET 5511

## (undated) DEVICE — CATH GUIDING BLUE YELLOW PTFE XB3.5 6FRX100CM 67005400

## (undated) DEVICE — BNDG ELASTIC 4"X5YDS UNSTERILE 6611-40

## (undated) RX ORDER — NITROGLYCERIN 5 MG/ML
VIAL (ML) INTRAVENOUS
Status: DISPENSED
Start: 2024-05-25

## (undated) RX ORDER — HEPARIN SODIUM 1000 [USP'U]/ML
INJECTION, SOLUTION INTRAVENOUS; SUBCUTANEOUS
Status: DISPENSED
Start: 2024-05-25

## (undated) RX ORDER — BUPIVACAINE HYDROCHLORIDE 5 MG/ML
INJECTION, SOLUTION EPIDURAL; INTRACAUDAL; PERINEURAL
Status: DISPENSED
Start: 2025-03-27

## (undated) RX ORDER — LIDOCAINE HYDROCHLORIDE 10 MG/ML
INJECTION, SOLUTION EPIDURAL; INFILTRATION; INTRACAUDAL; PERINEURAL
Status: DISPENSED
Start: 2024-05-25

## (undated) RX ORDER — FENTANYL CITRATE 50 UG/ML
INJECTION, SOLUTION INTRAMUSCULAR; INTRAVENOUS
Status: DISPENSED
Start: 2024-05-25

## (undated) RX ORDER — HEPARIN SODIUM 200 [USP'U]/100ML
INJECTION, SOLUTION INTRAVENOUS
Status: DISPENSED
Start: 2024-05-25

## (undated) RX ORDER — VERAPAMIL HYDROCHLORIDE 2.5 MG/ML
INJECTION, SOLUTION INTRAVENOUS
Status: DISPENSED
Start: 2024-05-25

## (undated) RX ORDER — HYDRALAZINE HYDROCHLORIDE 20 MG/ML
INJECTION INTRAMUSCULAR; INTRAVENOUS
Status: DISPENSED
Start: 2024-05-25